# Patient Record
Sex: FEMALE | Race: WHITE | NOT HISPANIC OR LATINO | Employment: FULL TIME | URBAN - METROPOLITAN AREA
[De-identification: names, ages, dates, MRNs, and addresses within clinical notes are randomized per-mention and may not be internally consistent; named-entity substitution may affect disease eponyms.]

---

## 2017-06-10 ENCOUNTER — HOSPITAL ENCOUNTER (EMERGENCY)
Facility: MEDICAL CENTER | Age: 22
End: 2017-06-10
Attending: EMERGENCY MEDICINE
Payer: MEDICAID

## 2017-06-10 VITALS
RESPIRATION RATE: 16 BRPM | OXYGEN SATURATION: 100 % | SYSTOLIC BLOOD PRESSURE: 124 MMHG | BODY MASS INDEX: 20 KG/M2 | TEMPERATURE: 98.1 F | WEIGHT: 112.88 LBS | HEIGHT: 63 IN | HEART RATE: 62 BPM | DIASTOLIC BLOOD PRESSURE: 73 MMHG

## 2017-06-10 DIAGNOSIS — S51.812A FOREARM LACERATION, LEFT, INITIAL ENCOUNTER: ICD-10-CM

## 2017-06-10 DIAGNOSIS — F32.A DEPRESSION, UNSPECIFIED DEPRESSION TYPE: ICD-10-CM

## 2017-06-10 LAB
ALBUMIN SERPL BCP-MCNC: 4.6 G/DL (ref 3.2–4.9)
ALBUMIN/GLOB SERPL: 1.6 G/DL
ALP SERPL-CCNC: 53 U/L (ref 30–99)
ALT SERPL-CCNC: 13 U/L (ref 2–50)
AMPHET UR QL SCN: NEGATIVE
ANION GAP SERPL CALC-SCNC: 9 MMOL/L (ref 0–11.9)
AST SERPL-CCNC: 17 U/L (ref 12–45)
BARBITURATES UR QL SCN: NEGATIVE
BASOPHILS # BLD AUTO: 0.6 % (ref 0–1.8)
BASOPHILS # BLD: 0.04 K/UL (ref 0–0.12)
BENZODIAZ UR QL SCN: NEGATIVE
BILIRUB SERPL-MCNC: 0.7 MG/DL (ref 0.1–1.5)
BUN SERPL-MCNC: 14 MG/DL (ref 8–22)
BZE UR QL SCN: NEGATIVE
CALCIUM SERPL-MCNC: 10 MG/DL (ref 8.5–10.5)
CANNABINOIDS UR QL SCN: POSITIVE
CHLORIDE SERPL-SCNC: 105 MMOL/L (ref 96–112)
CO2 SERPL-SCNC: 27 MMOL/L (ref 20–33)
CREAT SERPL-MCNC: 0.6 MG/DL (ref 0.5–1.4)
EOSINOPHIL # BLD AUTO: 0.02 K/UL (ref 0–0.51)
EOSINOPHIL NFR BLD: 0.3 % (ref 0–6.9)
ERYTHROCYTE [DISTWIDTH] IN BLOOD BY AUTOMATED COUNT: 43.8 FL (ref 35.9–50)
ETHANOL BLD-MCNC: 0.15 G/DL
GFR SERPL CREATININE-BSD FRML MDRD: >60 ML/MIN/1.73 M 2
GLOBULIN SER CALC-MCNC: 2.8 G/DL (ref 1.9–3.5)
GLUCOSE SERPL-MCNC: 87 MG/DL (ref 65–99)
HCG SERPL QL: NEGATIVE
HCT VFR BLD AUTO: 43.8 % (ref 37–47)
HGB BLD-MCNC: 15 G/DL (ref 12–16)
IMM GRANULOCYTES # BLD AUTO: 0.01 K/UL (ref 0–0.11)
IMM GRANULOCYTES NFR BLD AUTO: 0.2 % (ref 0–0.9)
LYMPHOCYTES # BLD AUTO: 1.64 K/UL (ref 1–4.8)
LYMPHOCYTES NFR BLD: 25.8 % (ref 22–41)
MCH RBC QN AUTO: 32.3 PG (ref 27–33)
MCHC RBC AUTO-ENTMCNC: 34.2 G/DL (ref 33.6–35)
MCV RBC AUTO: 94.4 FL (ref 81.4–97.8)
MDMA UR QL SCN: NEGATIVE
METHADONE UR QL SCN: NEGATIVE
MONOCYTES # BLD AUTO: 0.44 K/UL (ref 0–0.85)
MONOCYTES NFR BLD AUTO: 6.9 % (ref 0–13.4)
NEUTROPHILS # BLD AUTO: 4.2 K/UL (ref 2–7.15)
NEUTROPHILS NFR BLD: 66.2 % (ref 44–72)
NRBC # BLD AUTO: 0 K/UL
NRBC BLD AUTO-RTO: 0 /100 WBC
OPIATES UR QL SCN: NEGATIVE
OXYCODONE UR QL SCN: NEGATIVE
PCP UR QL SCN: NEGATIVE
PLATELET # BLD AUTO: 208 K/UL (ref 164–446)
PMV BLD AUTO: 11.5 FL (ref 9–12.9)
POC BREATHALIZER: 0.08 PERCENT (ref 0–0.01)
POC BREATHALIZER: 0.08 PERCENT (ref 0–0.01)
POTASSIUM SERPL-SCNC: 3.8 MMOL/L (ref 3.6–5.5)
PROPOXYPH UR QL SCN: NEGATIVE
PROT SERPL-MCNC: 7.4 G/DL (ref 6–8.2)
RBC # BLD AUTO: 4.64 M/UL (ref 4.2–5.4)
SODIUM SERPL-SCNC: 141 MMOL/L (ref 135–145)
WBC # BLD AUTO: 6.4 K/UL (ref 4.8–10.8)

## 2017-06-10 PROCEDURE — 80053 COMPREHEN METABOLIC PANEL: CPT

## 2017-06-10 PROCEDURE — 700101 HCHG RX REV CODE 250: Performed by: EMERGENCY MEDICINE

## 2017-06-10 PROCEDURE — 302970 POC BREATHALIZER: Performed by: EMERGENCY MEDICINE

## 2017-06-10 PROCEDURE — 90791 PSYCH DIAGNOSTIC EVALUATION: CPT

## 2017-06-10 PROCEDURE — 304999 HCHG REPAIR-SIMPLE/INTERMED LEVEL 1

## 2017-06-10 PROCEDURE — 303747 HCHG EXTRA SUTURE

## 2017-06-10 PROCEDURE — 85025 COMPLETE CBC W/AUTO DIFF WBC: CPT

## 2017-06-10 PROCEDURE — 80307 DRUG TEST PRSMV CHEM ANLYZR: CPT

## 2017-06-10 PROCEDURE — 84703 CHORIONIC GONADOTROPIN ASSAY: CPT

## 2017-06-10 PROCEDURE — 303485 HCHG DRESSING MEDIUM

## 2017-06-10 PROCEDURE — 99284 EMERGENCY DEPT VISIT MOD MDM: CPT

## 2017-06-10 RX ORDER — LIDOCAINE HYDROCHLORIDE 10 MG/ML
20 INJECTION, SOLUTION INFILTRATION; PERINEURAL ONCE
Status: COMPLETED | OUTPATIENT
Start: 2017-06-10 | End: 2017-06-10

## 2017-06-10 RX ADMIN — LIDOCAINE HYDROCHLORIDE 20 ML: 10 INJECTION, SOLUTION INFILTRATION; PERINEURAL at 08:10

## 2017-06-10 ASSESSMENT — PAIN SCALES - GENERAL: PAINLEVEL_OUTOF10: 0

## 2017-06-10 NOTE — ED NOTES
".  Chief Complaint   Patient presents with   • Suicidal Ideation     Patient presents with SI with cut marks to left forearm, one open.  Bleeding controlled with dressing.  Small cut to R index finger.     Patient tearful during triage.  States that she has been having a hard time and \"some days are harder then others.\"  Hx of cutting.    Accompanied by family and friend.     Charge nurse notified. Patient to [facility].    "

## 2017-06-10 NOTE — ED AVS SNAPSHOT
6/10/2017    Maritza Swann  [address] [address]    Dear Maritza:    [facility] wants to ensure your discharge home is safe and you or your loved ones have had all of your questions answered regarding your care after you leave the hospital.    Below is a list of resources and contact information should you have any questions regarding your hospital stay, follow-up instructions, or active medical symptoms.    Questions or Concerns Regarding… Contact   Medical Questions Related to Your Discharge  (7 days a week, 8am-5pm) Contact a Nurse Care Coordinator   [phone]   Medical Questions Not Related to Your Discharge  (24 hours a day / 7 days a week)  Contact the Nurse Health Line   [phone]    Medications or Discharge Instructions Refer to your discharge packet   or contact your [facility] Primary Care Provider   [phone]   Follow-up Appointment(s) Schedule your appointment via [facility]   or contact Scheduling [phone]   Billing Review your statement via [facility]  or contact Billing [phone]   Medical Records Review your records via [facility]   or contact Medical Records [phone]     You may receive a telephone call within two days of discharge. This call is to make certain you understand your discharge instructions and have the opportunity to have any questions answered. You can also easily access your medical information, test results and upcoming appointments via the [facility] free online health management tool. You can learn more and sign up at [employer]. For assistance setting up your [facility] account, please call [phone].    Once again, we want to ensure your discharge home is safe and that you have a clear understanding of any next steps in your care. If you have any questions or concerns, please do not hesitate to contact us, we are here for you. Thank you for choosing [facility] for your healthcare needs.    Sincerely,    Your [facility] Healthcare Team

## 2017-06-10 NOTE — ED AVS SNAPSHOT
Home Care Instructions                                                                                                                Maritza Swann   MRN: [id number]    Department:  [facility], Emergency Dept   Date of Visit:  6/10/2017            [facility], Emergency Dept    [address] [address]    Phone:  [phone]      You were seen by     Guy G Gansert, M.D.      Your Diagnosis Was     Depression, unspecified depression type     F32.9       These are the medications you received during your hospitalization from 06/10/2017 0623 to 06/10/2017 1358     Date/Time Order Dose Route Action    06/10/2017 0810 lidocaine (XYLOCAINE) 1%  injection 20 mL Other Given      Follow-up Information     1. Follow up with [facility].    Why:  1.  Stitches out in 7 days; 2.  Follow up for assistance with depression;    Contact information    [address] [place] [id number] [phone]      Medication Information     Review all of your home medications and newly ordered medications with your primary doctor and/or pharmacist as soon as possible. Follow medication instructions as directed by your doctor and/or pharmacist.     Please keep your complete medication list with you and share with your physician. Update the information when medications are discontinued, doses are changed, or new medications (including over-the-counter products) are added; and carry medication information at all times in the event of emergency situations.               Medication List      Notice     You have not been prescribed any medications.            Procedures and tests performed during your visit     Procedure/Test Number of Times Performed    Blood Alcohol 1    CBC WITH DIFFERENTIAL 1    COMP METABOLIC PANEL 1    ESTIMATED GFR 1    HCG QUAL SERUM 1    POC BREATHALIZER 2    URINE DRUG SCREEN (TRIAGE) 1        Discharge Instructions       Depression,  Adult  Depression refers to feeling sad, low, down in the dumps, blue, gloomy, or empty. In general, there are two kinds of depression:  · Normal sadness or normal grief. This kind of depression is one that we all feel from time to time after upsetting life experiences, such as the loss of a job or the ending of a relationship. This kind of depression is considered normal, is short lived, and resolves within a few days to 2 weeks. Depression experienced after the loss of a loved one (bereavement) often lasts longer than 2 weeks but normally gets better with time.  · Clinical depression. This kind of depression lasts longer than normal sadness or normal grief or interferes with your ability to function at home, at work, and in school. It also interferes with your personal relationships. It affects almost every aspect of your life. Clinical depression is an illness.  Symptoms of depression can also be caused by conditions other than those mentioned above, such as:  · Physical illness. Some physical illnesses, including underactive thyroid gland (hypothyroidism), severe anemia, specific types of cancer, diabetes, uncontrolled seizures, heart and lung problems, strokes, and chronic pain are commonly associated with symptoms of depression.  · Side effects of some prescription medicine. In some people, certain types of medicine can cause symptoms of depression.  · Substance abuse. Abuse of alcohol and illicit drugs can cause symptoms of depression.  SYMPTOMS  Symptoms of normal sadness and normal grief include the following:  · Feeling sad or crying for short periods of time.  · Not caring about anything (apathy).  · Difficulty sleeping or sleeping too much.  · No longer able to enjoy the things you used to enjoy.  · Desire to be by oneself all the time (social isolation).  · Lack of energy or motivation.  · Difficulty concentrating or remembering.  · Change in appetite or weight.  · Restlessness or agitation.  Symptoms  of clinical depression include the same symptoms of normal sadness or normal grief and also the following symptoms:  · Feeling sad or crying all the time.  · Feelings of guilt or worthlessness.  · Feelings of hopelessness or helplessness.  · Thoughts of suicide or the desire to harm yourself (suicidal ideation).  · Loss of touch with reality (psychotic symptoms). Seeing or hearing things that are not real (hallucinations) or having false beliefs about your life or the people around you (delusions and paranoia).  DIAGNOSIS   The diagnosis of clinical depression is usually based on how bad the symptoms are and how long they have lasted. Your health care provider will also ask you questions about your medical history and substance use to find out if physical illness, use of prescription medicine, or substance abuse is causing your depression. Your health care provider may also order blood tests.  TREATMENT   Often, normal sadness and normal grief do not require treatment. However, sometimes antidepressant medicine is given for bereavement to ease the depressive symptoms until they resolve.  The treatment for clinical depression depends on how bad the symptoms are but often includes antidepressant medicine, counseling with a mental health professional, or both. Your health care provider will help to determine what treatment is best for you.  Depression caused by physical illness usually goes away with appropriate medical treatment of the illness. If prescription medicine is causing depression, talk with your health care provider about stopping the medicine, decreasing the dose, or changing to another medicine.  Depression caused by the abuse of alcohol or illicit drugs goes away when you stop using these substances. Some adults need professional help in order to stop drinking or using drugs.  SEEK IMMEDIATE MEDICAL CARE IF:  · You have thoughts about hurting yourself or others.  · You lose touch with reality (have  psychotic symptoms).  · You are taking medicine for depression and have a serious side effect.  FOR MORE INFORMATION  · National [place] on Mental Illness: www.karishma.org   · National [place] of Mental Health: www.nimh.nih.gov      This information is not intended to replace advice given to you by your health care provider. Make sure you discuss any questions you have with your health care provider.     Document Released: 12/15/2001 Document Revised: 01/08/2016 Document Reviewed: 03/18/2013  [employer] Interactive Patient Education ©2016 [employer]  Laceration Care, Adult  A laceration is a cut that goes through all of the layers of the skin and into the tissue that is right under the skin. Some lacerations heal on their own. Others need to be closed with stitches (sutures), staples, skin adhesive strips, or skin glue. Proper laceration care minimizes the risk of infection and helps the laceration to heal better.  HOW TO CARE FOR YOUR LACERATION  If sutures or staples were used:  · Keep the wound clean and dry.  · If you were given a bandage (dressing), you should change it at least one time per day or as told by your health care provider. You should also change it if it becomes wet or dirty.  · Keep the wound completely dry for the first 24 hours or as told by your health care provider. After that time, you may shower or bathe. However, make sure that the wound is not soaked in water until after the sutures or staples have been removed.  · Clean the wound one time each day or as told by your health care provider:  ¨ Wash the wound with soap and water.  ¨ Rinse the wound with water to remove all soap.  ¨ Pat the wound dry with a clean towel. Do not rub the wound.  · After cleaning the wound, apply a thin layer of antibiotic ointment as told by your health care provider. This will help to prevent infection and keep the dressing from sticking to the wound.  · Have the sutures or staples removed as told by your  health care provider.  If skin adhesive strips were used:  · Keep the wound clean and dry.  · If you were given a bandage (dressing), you should change it at least one time per day or as told by your health care provider. You should also change it if it becomes dirty or wet.  · Do not get the skin adhesive strips wet. You may shower or bathe, but be careful to keep the wound dry.  · If the wound gets wet, pat it dry with a clean towel. Do not rub the wound.  · Skin adhesive strips fall off on their own. You may trim the strips as the wound heals. Do not remove skin adhesive strips that are still stuck to the wound. They will fall off in time.  If skin glue was used:  · Try to keep the wound dry, but you may briefly wet it in the shower or bath. Do not soak the wound in water, such as by swimming.  · After you have showered or bathed, gently pat the wound dry with a clean towel. Do not rub the wound.  · Do not do any activities that will make you sweat heavily until the skin glue has fallen off on its own.  · Do not apply liquid, cream, or ointment medicine to the wound while the skin glue is in place. Using those may loosen the film before the wound has healed.  · If you were given a bandage (dressing), you should change it at least one time per day or as told by your health care provider. You should also change it if it becomes dirty or wet.  · If a dressing is placed over the wound, be careful not to apply tape directly over the skin glue. Doing that may cause the glue to be pulled off before the wound has healed.  · Do not pick at the glue. The skin glue usually remains in place for 5-10 days, then it falls off of the skin.  General Instructions  · Take over-the-counter and prescription medicines only as told by your health care provider.  · If you were prescribed an antibiotic medicine or ointment, take or apply it as told by your doctor. Do not stop using it even if your condition improves.  · To help prevent  scarring, make sure to cover your wound with sunscreen whenever you are outside after stitches are removed, after adhesive strips are removed, or when glue remains in place and the wound is healed. Make sure to wear a sunscreen of at least 30 SPF.  · Do not scratch or pick at the wound.  · Keep all follow-up visits as told by your health care provider. This is important.  · Check your wound every day for signs of infection. Watch for:  ¨ Redness, swelling, or pain.  ¨ Fluid, blood, or pus.  · Raise (elevate) the injured area above the level of your heart while you are sitting or lying down, if possible.  SEEK MEDICAL CARE IF:  · You received a tetanus shot and you have swelling, severe pain, redness, or bleeding at the injection site.  · You have a fever.  · A wound that was closed breaks open.  · You notice a bad smell coming from your wound or your dressing.  · You notice something coming out of the wound, such as wood or glass.  · Your pain is not controlled with medicine.  · You have increased redness, swelling, or pain at the site of your wound.  · You have fluid, blood, or pus coming from your wound.  · You notice a change in the color of your skin near your wound.  · You need to change the dressing frequently due to fluid, blood, or pus draining from the wound.  · You develop a new rash.  · You develop numbness around the wound.  SEEK IMMEDIATE MEDICAL CARE IF:  · You develop severe swelling around the wound.  · Your pain suddenly increases and is severe.  · You develop painful lumps near the wound or on skin that is anywhere on your body.  · You have a red streak going away from your wound.  · The wound is on your hand or foot and you cannot properly move a finger or toe.  · The wound is on your hand or foot and you notice that your fingers or toes look pale or bluish.     This information is not intended to replace advice given to you by your health care provider. Make sure you discuss any questions you  have with your health care provider.     Document Released: 12/18/2006 Document Revised: 05/03/2016 Document Reviewed: 12/14/2015  [employer] Interactive Patient Education ©2016 [employer]            Patient Information     Patient Information    Following emergency treatment: all patient requiring follow-up care must return either to a private physician or a clinic if your condition worsens before you are able to obtain further medical attention, please return to the emergency room.     Billing Information    At [facility], we work to make the billing process streamlined for our patients.  Our Representatives are here to answer any questions you may have regarding your hospital bill.  If you have insurance coverage and have supplied your insurance information to us, we will submit a claim to your insurer on your behalf.  Should you have any questions regarding your bill, we can be reached online or by phone as follows:  Online: You are able pay your bills online or live chat with our representatives about any billing questions you may have. We are here to help Monday - Friday from 8:00am to 7:30pm and 9:00am - 12:00pm on Saturdays.  Please visit [email or web address]  for more information.   Phone:  [phone] or [phone]    Please note that your emergency physician, surgeon, pathologist, radiologist, anesthesiologist, and other specialists are not employed by [facility] and will therefore bill separately for their services.  Please contact them directly for any questions concerning their bills at the numbers below:     Emergency Physician Services:  [phone]  [facility]:  [phone]  [facility]:  [phone]  [facility]:  [phone]    1. Your final bill may vary from the amount quoted upon discharge if all procedures are not complete at that time, or if your doctor has additional procedures of which we are not  aware. You will receive an additional bill if you return to the Emergency Department at [facility] for suture removal regardless of the facility of which the sutures were placed.     2. Please arrange for settlement of this account at the emergency registration.    3. All self-pay accounts are due in full at the time of treatment.  If you are unable to meet this obligation then payment is expected within 4-5 days.     4. If you have had radiology studies (CT, X-ray, Ultrasound, MRI), you have received a preliminary result during your emergency department visit. Please contact the radiology department [phone] to receive a copy of your final result. Please discuss the Final result with your primary physician or with the follow up physician provided.     Crisis Hotline:  [place] Crisis Hotline:  8-111-HBEKQNZ or 1-185.588.6037  [place] Crisis Hotline:    1-844.306.2207 or 085-424-3666         ED Discharge Follow Up Questions    1. In order to provide you with very good care, we would like to follow up with a phone call in the next few days.  May we have your permission to contact you?     YES /  NO    2. What is the best phone number to call you? (       )_____-__________    3. What is the best time to call you?      Morning  /  Afternoon  /  Evening                   Patient Signature:  ____________________________________________________________    Date:  ____________________________________________________________
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
## 2017-06-10 NOTE — ED NOTES
Pt's belongings in single blue personal belonging bag. Labeled approrpriately and placed in ambulance bay.

## 2017-06-10 NOTE — ED AVS SNAPSHOT
[employer] Access Code: [email or web address]  Expires: 7/10/2017  1:58 PM    [employer]  A secure, online tool to manage your health information     [employer]® is a secure, online tool that connects you to your personalized health information from the privacy of your home -- day or night - making it very easy for you to manage your healthcare. Once the activation process is completed, you can even access your medical information using the [employer] katarzyna, which is available for free in the Apple Katarzyna store or Google Play store.     [employer] provides the following levels of access (as shown below):   My Chart Features   [facility] Primary Care Doctor [facility]  Specialists [facility]  Urgent  Care Non-[facility]  Primary Care  Doctor   Email your healthcare team securely and privately 24/7 X X X X   Manage appointments: schedule your next appointment; view details of past/upcoming appointments X      Request prescription refills. X      View recent personal medical records, including lab and immunizations X X X X   View health record, including health history, allergies, medications X X X X   Read reports about your outpatient visits, procedures, consult and ER notes X X X X   See your discharge summary, which is a recap of your hospital and/or ER visit that includes your diagnosis, lab results, and care plan. X X       How to register for [employer]:  1. Go to  [email or web address].  2. Click on the Sign Up Now box, which takes you to the New Member Sign Up page. You will need to provide the following information:  a. Enter your [employer] Access Code exactly as it appears at the top of this page. (You will not need to use this code after you’ve completed the sign-up process. If you do not sign up before the expiration date, you must request a new code.)   b. Enter your date of birth.   c. Enter your home email address.   d. Click Submit, and follow the next screen’s instructions.  3. Create a [employer] ID. This will be your [employer]  login ID and cannot be changed, so think of one that is secure and easy to remember.  4. Create a [employer] password. You can change your password at any time.  5. Enter your Password Reset Question and Answer. This can be used at a later time if you forget your password.   6. Enter your e-mail address. This allows you to receive e-mail notifications when new information is available in [employer].  7. Click Sign Up. You can now view your health information.    For assistance activating your [employer] account, call [phone]

## 2017-06-10 NOTE — ED NOTES
Pt provided with discharge instructions, instructions for follow up appointment and to return in 7 days for suture removal, s/s of when to seek emergency care.  Pt verbalizes understanding.  Pt discharged in good condition.

## 2017-06-10 NOTE — DISCHARGE INSTRUCTIONS
Depression, Adult  Depression refers to feeling sad, low, down in the dumps, blue, gloomy, or empty. In general, there are two kinds of depression:  · Normal sadness or normal grief. This kind of depression is one that we all feel from time to time after upsetting life experiences, such as the loss of a job or the ending of a relationship. This kind of depression is considered normal, is short lived, and resolves within a few days to 2 weeks. Depression experienced after the loss of a loved one (bereavement) often lasts longer than 2 weeks but normally gets better with time.  · Clinical depression. This kind of depression lasts longer than normal sadness or normal grief or interferes with your ability to function at home, at work, and in school. It also interferes with your personal relationships. It affects almost every aspect of your life. Clinical depression is an illness.  Symptoms of depression can also be caused by conditions other than those mentioned above, such as:  · Physical illness. Some physical illnesses, including underactive thyroid gland (hypothyroidism), severe anemia, specific types of cancer, diabetes, uncontrolled seizures, heart and lung problems, strokes, and chronic pain are commonly associated with symptoms of depression.  · Side effects of some prescription medicine. In some people, certain types of medicine can cause symptoms of depression.  · Substance abuse. Abuse of alcohol and illicit drugs can cause symptoms of depression.  SYMPTOMS  Symptoms of normal sadness and normal grief include the following:  · Feeling sad or crying for short periods of time.  · Not caring about anything (apathy).  · Difficulty sleeping or sleeping too much.  · No longer able to enjoy the things you used to enjoy.  · Desire to be by oneself all the time (social isolation).  · Lack of energy or motivation.  · Difficulty concentrating or remembering.  · Change in appetite or weight.  · Restlessness or  agitation.  Symptoms of clinical depression include the same symptoms of normal sadness or normal grief and also the following symptoms:  · Feeling sad or crying all the time.  · Feelings of guilt or worthlessness.  · Feelings of hopelessness or helplessness.  · Thoughts of suicide or the desire to harm yourself (suicidal ideation).  · Loss of touch with reality (psychotic symptoms). Seeing or hearing things that are not real (hallucinations) or having false beliefs about your life or the people around you (delusions and paranoia).  DIAGNOSIS   The diagnosis of clinical depression is usually based on how bad the symptoms are and how long they have lasted. Your health care provider will also ask you questions about your medical history and substance use to find out if physical illness, use of prescription medicine, or substance abuse is causing your depression. Your health care provider may also order blood tests.  TREATMENT   Often, normal sadness and normal grief do not require treatment. However, sometimes antidepressant medicine is given for bereavement to ease the depressive symptoms until they resolve.  The treatment for clinical depression depends on how bad the symptoms are but often includes antidepressant medicine, counseling with a mental health professional, or both. Your health care provider will help to determine what treatment is best for you.  Depression caused by physical illness usually goes away with appropriate medical treatment of the illness. If prescription medicine is causing depression, talk with your health care provider about stopping the medicine, decreasing the dose, or changing to another medicine.  Depression caused by the abuse of alcohol or illicit drugs goes away when you stop using these substances. Some adults need professional help in order to stop drinking or using drugs.  SEEK IMMEDIATE MEDICAL CARE IF:  · You have thoughts about hurting yourself or others.  · You lose touch  with reality (have psychotic symptoms).  · You are taking medicine for depression and have a serious side effect.  FOR MORE INFORMATION  · National [place] on Mental Illness: www.karishma.org   · National [place] of Mental Health: www.nimh.nih.gov      This information is not intended to replace advice given to you by your health care provider. Make sure you discuss any questions you have with your health care provider.     Document Released: 12/15/2001 Document Revised: 01/08/2016 Document Reviewed: 03/18/2013  [employer] Interactive Patient Education ©2016 [employer]  Laceration Care, Adult  A laceration is a cut that goes through all of the layers of the skin and into the tissue that is right under the skin. Some lacerations heal on their own. Others need to be closed with stitches (sutures), staples, skin adhesive strips, or skin glue. Proper laceration care minimizes the risk of infection and helps the laceration to heal better.  HOW TO CARE FOR YOUR LACERATION  If sutures or staples were used:  · Keep the wound clean and dry.  · If you were given a bandage (dressing), you should change it at least one time per day or as told by your health care provider. You should also change it if it becomes wet or dirty.  · Keep the wound completely dry for the first 24 hours or as told by your health care provider. After that time, you may shower or bathe. However, make sure that the wound is not soaked in water until after the sutures or staples have been removed.  · Clean the wound one time each day or as told by your health care provider:  ¨ Wash the wound with soap and water.  ¨ Rinse the wound with water to remove all soap.  ¨ Pat the wound dry with a clean towel. Do not rub the wound.  · After cleaning the wound, apply a thin layer of antibiotic ointment as told by your health care provider. This will help to prevent infection and keep the dressing from sticking to the wound.  · Have the sutures or staples removed as  told by your health care provider.  If skin adhesive strips were used:  · Keep the wound clean and dry.  · If you were given a bandage (dressing), you should change it at least one time per day or as told by your health care provider. You should also change it if it becomes dirty or wet.  · Do not get the skin adhesive strips wet. You may shower or bathe, but be careful to keep the wound dry.  · If the wound gets wet, pat it dry with a clean towel. Do not rub the wound.  · Skin adhesive strips fall off on their own. You may trim the strips as the wound heals. Do not remove skin adhesive strips that are still stuck to the wound. They will fall off in time.  If skin glue was used:  · Try to keep the wound dry, but you may briefly wet it in the shower or bath. Do not soak the wound in water, such as by swimming.  · After you have showered or bathed, gently pat the wound dry with a clean towel. Do not rub the wound.  · Do not do any activities that will make you sweat heavily until the skin glue has fallen off on its own.  · Do not apply liquid, cream, or ointment medicine to the wound while the skin glue is in place. Using those may loosen the film before the wound has healed.  · If you were given a bandage (dressing), you should change it at least one time per day or as told by your health care provider. You should also change it if it becomes dirty or wet.  · If a dressing is placed over the wound, be careful not to apply tape directly over the skin glue. Doing that may cause the glue to be pulled off before the wound has healed.  · Do not pick at the glue. The skin glue usually remains in place for 5-10 days, then it falls off of the skin.  General Instructions  · Take over-the-counter and prescription medicines only as told by your health care provider.  · If you were prescribed an antibiotic medicine or ointment, take or apply it as told by your doctor. Do not stop using it even if your condition improves.  · To  help prevent scarring, make sure to cover your wound with sunscreen whenever you are outside after stitches are removed, after adhesive strips are removed, or when glue remains in place and the wound is healed. Make sure to wear a sunscreen of at least 30 SPF.  · Do not scratch or pick at the wound.  · Keep all follow-up visits as told by your health care provider. This is important.  · Check your wound every day for signs of infection. Watch for:  ¨ Redness, swelling, or pain.  ¨ Fluid, blood, or pus.  · Raise (elevate) the injured area above the level of your heart while you are sitting or lying down, if possible.  SEEK MEDICAL CARE IF:  · You received a tetanus shot and you have swelling, severe pain, redness, or bleeding at the injection site.  · You have a fever.  · A wound that was closed breaks open.  · You notice a bad smell coming from your wound or your dressing.  · You notice something coming out of the wound, such as wood or glass.  · Your pain is not controlled with medicine.  · You have increased redness, swelling, or pain at the site of your wound.  · You have fluid, blood, or pus coming from your wound.  · You notice a change in the color of your skin near your wound.  · You need to change the dressing frequently due to fluid, blood, or pus draining from the wound.  · You develop a new rash.  · You develop numbness around the wound.  SEEK IMMEDIATE MEDICAL CARE IF:  · You develop severe swelling around the wound.  · Your pain suddenly increases and is severe.  · You develop painful lumps near the wound or on skin that is anywhere on your body.  · You have a red streak going away from your wound.  · The wound is on your hand or foot and you cannot properly move a finger or toe.  · The wound is on your hand or foot and you notice that your fingers or toes look pale or bluish.     This information is not intended to replace advice given to you by your health care provider. Make sure you discuss any  questions you have with your health care provider.     Document Released: 12/18/2006 Document Revised: 05/03/2016 Document Reviewed: 12/14/2015  [email or web address] Interactive Patient Education ©2016 [employer]

## 2017-06-10 NOTE — ED PROVIDER NOTES
ED Provider Note    CHIEF COMPLAINT  Chief Complaint   Patient presents with   • Suicidal Ideation       HPI  Maritza Swann is a 21 y.o. female who presents for evaluation of suicidal ideation.  The patient presents with friends after he she inflicted lacerations to her left forearm.  The patient denies any specific event that precipitated this.  She states she's had depression in the past and has had previous suicide attempts by cutting herself.  She is currently not on any antidepressants or receive treatment for her depression.  She denies recent: Fever, URI symptoms, cardiorespiratory symptoms, gastrointestinal symptoms.  No other acute symptomatology or complaints.    REVIEW OF SYSTEMS  See HPI for further details.  No history of: Diabetes, thyroid dysfunction, seizures, cardiac disorders.  All other systems negative.    PAST MEDICAL HISTORY  Past Medical History   Diagnosis Date   • Other specified symptom associated with female genital organs      endometriosis, ovarian cysts   • Asthma      as a child   • Migraine    • Endometriosis 2014   • Ovarian cyst 2014   • Depression        FAMILY HISTORY  Family History   Problem Relation Age of Onset   • Diabetes     • Hypertension     • Hypertension Father    • Bipolar disorder Sister    • Other Sister      mental disorder.   • Asthma Brother    • Arthritis Maternal Grandmother    • Diabetes Maternal Grandmother    • Bipolar disorder Maternal Grandfather    • Other Maternal Grandfather      mental disorder.       SOCIAL HISTORY  Denies tobacco use; positive alcohol use; positive marijuana use;    SURGICAL HISTORY  Past Surgical History   Procedure Laterality Date   • Dental extraction(s)  2008     wisdom teeth   • Laparoscopy  7/20/2014   • Mammoplasty augmentation Bilateral 5/27/2015     Procedure: MAMMOPLASTY AUGMENTATION-SALINE;  Surgeon: Prince Lyn M.D.;  Location: SURGERY [facility];  Service:        CURRENT MEDICATIONS  Home Medications   "   Reviewed by Nevin Montana R.N. (Registered Nurse) on 06/10/17 at 0701  Med List Status: Complete    Medication Last Dose Status          Patient Morris Taking any Medications                        ALLERGIES  No Known Allergies    PHYSICAL EXAM  VITAL SIGNS: /71 mmHg  Pulse 63  Temp(Src) 36.7 °C (98.1 °F)  Resp 14  Ht 1.6 m (5' 3\")  Wt 51.2 kg (112 lb 14 oz)  BMI 20.00 kg/m2  SpO2 98%  Breastfeeding? Yes   Constitutional: 21-year-old female, Well developed, Well nourished, tearful, smells of alcohol metabolites, oriented ×3   HENT: Normocephalic, Atraumatic, Nares:Clear, Oropharynx: moist, well hydrated, posterior pharynx:clear   Eyes: PERRL, EOMI, Conjunctiva normal, No discharge.   Neck: Normal range of motion, No tenderness, Supple, No stridor.   Lymphatic: No lymphadenopathy noted.   Cardiovascular: Regular rate and rhythm without mumurs, gallups, rubs   Thorax & Lungs: Normal Equal breath sounds, No respiratory distress, No wheezing, no stridor, no rales. No chest tenderness.   Abdomen: Soft, nontender, nondistended, no organomegaly, positive bowel sounds normal in quality. No guarding or rebound.  Skin: Good skin turgor, pink, warm, dry. No rashes, petechiae, purpura. Normal capillary refill.   Back: No tenderness, No CVA tenderness.   Extremities: Intact distal pulses, No edema, No tenderness, No cyanosis,  Vascular: Pulses are 2+, symmetric in the upper and lower extremities.  Musculoskeletal: Left forearm reveals multiple lacerations: A 4 cm laceration over the distal volar forearm area; a 2 cm laceration proximal to the larger laceration; both are full thickness; no foreign bodies identified and no evidence of neurovascular injury;  Neurologic: Alert & oriented x 3,  No gross focal deficits noted.   Psychiatric: Affect normal, Judgment normal, Mood: Depressed but no active suicidal ideation      RADIOLOGY/PROCEDURES  1.  Suture repair    COURSE & MEDICAL DECISION MAKING  Pertinent " Labs & Imaging studies reviewed. (See chart for details)  Laboratory studies: CBC and differential within normal; CMP within normal limits; diagnostic alcohol 0.15; beta hCG is negative; urine drug screen is positive for cannabinoids;    Procedure note: The patient's forearm lacerations were prepped and draped in sterile fashion.  The skin was anesthetized with 1% lidocaine.  Lacerations were closed with 4-0 Ethilon.  Running suture was performed on both lacerations.  11 throws were performed on the laceration and 6 throws on the smaller laceration.  No complications.  Wound cleansed and dressed.    Discussion/consultation: At this time, the patient presents for evaluation of depression with suicidal ideation and self-inflicted lacerations to her forearm.  Lacerations were treated as noted above.  The patient was evaluated by the Alert Team counselor.  The patient is not exhibiting active suicidal ideation and does not meet criteria for involuntary committal under legal 2000.  The alert team counselor agrees with this assessment.  The patient has been given appropriate outpatient follow-up services.  In addition, instructions regarding her laceration care were given.    FINAL IMPRESSION  1. Depression, unspecified depression type    2. Forearm laceration, left, initial encounter         PLAN  1.  Appropriate discharge instructions given  2.  Stitches out in one week  3.  Follow-up with primary care  4.  Follow-up with psychology/psychiatry    Electronically signed by: Guy G Gansert, 6/10/2017 7:03 AM

## 2017-06-10 NOTE — ED NOTES
Legal hold discontinued by ERP.  Pt denies SI and has a good support system.  1 bag of belongings returned to pt.

## 2017-06-10 NOTE — CONSULTS
[facility]  Crisis/Safety Plan    Name:  Maritza Swann  MRN:  [id number]  Date:  6/10/2017    Warning signs that a crisis may be developing for me or I may be at risk:  1) increasing feelings of despair and sadness  2) increase in anxiety and panic attacks  3)lack of interest and pleasure in life    Coping strategies I can use on my own (relaxation, physical activity, etc):  1) try to exercise every day and get at least 7 hrs of sleep a night  2) play with your child in the park  3) listen to soft music    Ways I can make my environment safe:  1)keep all weapons out of your home  2)keep sharps secure  3)keep medications secure    Things I want to tell myself when I feel a crisis developin) try to stay calm  2) remember there is help out there  3)use resources in the community for help    People I can contact for support or distraction (and their phone numbers):  1) Bela 8866636  2) Agatha 0248163  3)use numbers below    If I’m not able to reach my support people, or the above strategies don’t help, I can contact the following professionals, agencies, or hotlines:  1) Crisis Call Center ():  [phone] -OR- [phone]  2) Crisis Text Line ():  Text START to 276390  3)   4)     Kishor Estes R.N.

## 2018-05-01 ENCOUNTER — APPOINTMENT (OUTPATIENT)
Dept: RADIOLOGY | Facility: MEDICAL CENTER | Age: 23
DRG: 163 | End: 2018-05-01
Attending: STUDENT IN AN ORGANIZED HEALTH CARE EDUCATION/TRAINING PROGRAM
Payer: MEDICAID

## 2018-05-01 ENCOUNTER — HOSPITAL ENCOUNTER (INPATIENT)
Facility: MEDICAL CENTER | Age: 23
LOS: 7 days | DRG: 163 | End: 2018-05-08
Attending: EMERGENCY MEDICINE | Admitting: INTERNAL MEDICINE
Payer: MEDICAID

## 2018-05-01 ENCOUNTER — APPOINTMENT (OUTPATIENT)
Dept: RADIOLOGY | Facility: MEDICAL CENTER | Age: 23
DRG: 163 | End: 2018-05-01
Attending: EMERGENCY MEDICINE
Payer: MEDICAID

## 2018-05-01 DIAGNOSIS — J18.9 PNEUMONIA OF LEFT LOWER LOBE DUE TO INFECTIOUS ORGANISM: ICD-10-CM

## 2018-05-01 DIAGNOSIS — J86.9 EMPYEMA OF LEFT PLEURAL SPACE (HCC): ICD-10-CM

## 2018-05-01 DIAGNOSIS — J90 PLEURAL EFFUSION: ICD-10-CM

## 2018-05-01 PROBLEM — R06.02 SHORTNESS OF BREATH: Status: ACTIVE | Noted: 2018-05-01

## 2018-05-01 PROBLEM — D75.839 THROMBOCYTOSIS: Status: ACTIVE | Noted: 2018-05-01

## 2018-05-01 LAB
ALBUMIN SERPL BCP-MCNC: 4 G/DL (ref 3.2–4.9)
ALBUMIN/GLOB SERPL: 0.9 G/DL
ALP SERPL-CCNC: 71 U/L (ref 30–99)
ALT SERPL-CCNC: 33 U/L (ref 2–50)
ANION GAP SERPL CALC-SCNC: 10 MMOL/L (ref 0–11.9)
APPEARANCE UR: CLEAR
AST SERPL-CCNC: 18 U/L (ref 12–45)
BASOPHILS # BLD AUTO: 0.6 % (ref 0–1.8)
BASOPHILS # BLD: 0.06 K/UL (ref 0–0.12)
BILIRUB SERPL-MCNC: 0.7 MG/DL (ref 0.1–1.5)
BILIRUB UR QL STRIP.AUTO: NEGATIVE
BNP SERPL-MCNC: 16 PG/ML (ref 0–100)
BUN SERPL-MCNC: 18 MG/DL (ref 8–22)
CALCIUM SERPL-MCNC: 9.8 MG/DL (ref 8.5–10.5)
CHLORIDE SERPL-SCNC: 100 MMOL/L (ref 96–112)
CO2 SERPL-SCNC: 27 MMOL/L (ref 20–33)
COLOR UR: YELLOW
CREAT SERPL-MCNC: 0.61 MG/DL (ref 0.5–1.4)
DEPRECATED D DIMER PPP IA-ACNC: 2687 NG/ML(D-DU)
EKG IMPRESSION: NORMAL
EOSINOPHIL # BLD AUTO: 0.01 K/UL (ref 0–0.51)
EOSINOPHIL NFR BLD: 0.1 % (ref 0–6.9)
ERYTHROCYTE [DISTWIDTH] IN BLOOD BY AUTOMATED COUNT: 44.7 FL (ref 35.9–50)
GLOBULIN SER CALC-MCNC: 4.4 G/DL (ref 1.9–3.5)
GLUCOSE SERPL-MCNC: 84 MG/DL (ref 65–99)
GLUCOSE UR STRIP.AUTO-MCNC: NEGATIVE MG/DL
HCG SERPL QL: NEGATIVE
HCT VFR BLD AUTO: 36 % (ref 37–47)
HGB BLD-MCNC: 12 G/DL (ref 12–16)
IMM GRANULOCYTES # BLD AUTO: 0.04 K/UL (ref 0–0.11)
IMM GRANULOCYTES NFR BLD AUTO: 0.4 % (ref 0–0.9)
KETONES UR STRIP.AUTO-MCNC: NEGATIVE MG/DL
LACTATE BLD-SCNC: 1.7 MMOL/L (ref 0.5–2)
LEUKOCYTE ESTERASE UR QL STRIP.AUTO: NEGATIVE
LYMPHOCYTES # BLD AUTO: 1.78 K/UL (ref 1–4.8)
LYMPHOCYTES NFR BLD: 16.7 % (ref 22–41)
MCH RBC QN AUTO: 31.8 PG (ref 27–33)
MCHC RBC AUTO-ENTMCNC: 33.3 G/DL (ref 33.6–35)
MCV RBC AUTO: 95.5 FL (ref 81.4–97.8)
MICRO URNS: ABNORMAL
MONOCYTES # BLD AUTO: 0.73 K/UL (ref 0–0.85)
MONOCYTES NFR BLD AUTO: 6.9 % (ref 0–13.4)
NEUTROPHILS # BLD AUTO: 8.03 K/UL (ref 2–7.15)
NEUTROPHILS NFR BLD: 75.3 % (ref 44–72)
NITRITE UR QL STRIP.AUTO: NEGATIVE
NRBC # BLD AUTO: 0 K/UL
NRBC BLD-RTO: 0 /100 WBC
PH UR STRIP.AUTO: >=9 [PH]
PLATELET # BLD AUTO: 726 K/UL (ref 164–446)
PMV BLD AUTO: 8.9 FL (ref 9–12.9)
POTASSIUM SERPL-SCNC: 4 MMOL/L (ref 3.6–5.5)
PROCALCITONIN SERPL-MCNC: 0.06 NG/ML
PROT SERPL-MCNC: 8.4 G/DL (ref 6–8.2)
PROT UR QL STRIP: NEGATIVE MG/DL
RBC # BLD AUTO: 3.77 M/UL (ref 4.2–5.4)
RBC UR QL AUTO: NEGATIVE
SODIUM SERPL-SCNC: 137 MMOL/L (ref 135–145)
SP GR UR STRIP.AUTO: 1.01
TROPONIN I SERPL-MCNC: <0.01 NG/ML (ref 0–0.04)
TSH SERPL DL<=0.005 MIU/L-ACNC: 0.75 UIU/ML (ref 0.38–5.33)
UROBILINOGEN UR STRIP.AUTO-MCNC: 0.2 MG/DL
WBC # BLD AUTO: 10.7 K/UL (ref 4.8–10.8)

## 2018-05-01 PROCEDURE — 700105 HCHG RX REV CODE 258: Performed by: EMERGENCY MEDICINE

## 2018-05-01 PROCEDURE — 94760 N-INVAS EAR/PLS OXIMETRY 1: CPT

## 2018-05-01 PROCEDURE — 84443 ASSAY THYROID STIM HORMONE: CPT

## 2018-05-01 PROCEDURE — 71045 X-RAY EXAM CHEST 1 VIEW: CPT

## 2018-05-01 PROCEDURE — 96374 THER/PROPH/DIAG INJ IV PUSH: CPT

## 2018-05-01 PROCEDURE — 99223 1ST HOSP IP/OBS HIGH 75: CPT | Performed by: INTERNAL MEDICINE

## 2018-05-01 PROCEDURE — 83605 ASSAY OF LACTIC ACID: CPT

## 2018-05-01 PROCEDURE — 700111 HCHG RX REV CODE 636 W/ 250 OVERRIDE (IP): Performed by: INTERNAL MEDICINE

## 2018-05-01 PROCEDURE — 84703 CHORIONIC GONADOTROPIN ASSAY: CPT

## 2018-05-01 PROCEDURE — 93005 ELECTROCARDIOGRAM TRACING: CPT | Performed by: STUDENT IN AN ORGANIZED HEALTH CARE EDUCATION/TRAINING PROGRAM

## 2018-05-01 PROCEDURE — A9270 NON-COVERED ITEM OR SERVICE: HCPCS | Performed by: INTERNAL MEDICINE

## 2018-05-01 PROCEDURE — 770020 HCHG ROOM/CARE - TELE (206)

## 2018-05-01 PROCEDURE — 700102 HCHG RX REV CODE 250 W/ 637 OVERRIDE(OP): Performed by: STUDENT IN AN ORGANIZED HEALTH CARE EDUCATION/TRAINING PROGRAM

## 2018-05-01 PROCEDURE — 93010 ELECTROCARDIOGRAM REPORT: CPT | Performed by: INTERNAL MEDICINE

## 2018-05-01 PROCEDURE — 700101 HCHG RX REV CODE 250: Performed by: INTERNAL MEDICINE

## 2018-05-01 PROCEDURE — 99285 EMERGENCY DEPT VISIT HI MDM: CPT

## 2018-05-01 PROCEDURE — 80053 COMPREHEN METABOLIC PANEL: CPT

## 2018-05-01 PROCEDURE — 85025 COMPLETE CBC W/AUTO DIFF WBC: CPT

## 2018-05-01 PROCEDURE — 81003 URINALYSIS AUTO W/O SCOPE: CPT

## 2018-05-01 PROCEDURE — 700105 HCHG RX REV CODE 258: Performed by: INTERNAL MEDICINE

## 2018-05-01 PROCEDURE — 84484 ASSAY OF TROPONIN QUANT: CPT

## 2018-05-01 PROCEDURE — 700111 HCHG RX REV CODE 636 W/ 250 OVERRIDE (IP): Performed by: EMERGENCY MEDICINE

## 2018-05-01 PROCEDURE — 700117 HCHG RX CONTRAST REV CODE 255: Performed by: STUDENT IN AN ORGANIZED HEALTH CARE EDUCATION/TRAINING PROGRAM

## 2018-05-01 PROCEDURE — 84145 PROCALCITONIN (PCT): CPT

## 2018-05-01 PROCEDURE — 85379 FIBRIN DEGRADATION QUANT: CPT

## 2018-05-01 PROCEDURE — 87086 URINE CULTURE/COLONY COUNT: CPT

## 2018-05-01 PROCEDURE — 700102 HCHG RX REV CODE 250 W/ 637 OVERRIDE(OP): Performed by: INTERNAL MEDICINE

## 2018-05-01 PROCEDURE — 83880 ASSAY OF NATRIURETIC PEPTIDE: CPT

## 2018-05-01 PROCEDURE — 87040 BLOOD CULTURE FOR BACTERIA: CPT

## 2018-05-01 PROCEDURE — 71275 CT ANGIOGRAPHY CHEST: CPT

## 2018-05-01 PROCEDURE — A9270 NON-COVERED ITEM OR SERVICE: HCPCS | Performed by: STUDENT IN AN ORGANIZED HEALTH CARE EDUCATION/TRAINING PROGRAM

## 2018-05-01 PROCEDURE — 36415 COLL VENOUS BLD VENIPUNCTURE: CPT

## 2018-05-01 RX ORDER — AMOXICILLIN 250 MG
2 CAPSULE ORAL 2 TIMES DAILY
Status: DISCONTINUED | OUTPATIENT
Start: 2018-05-01 | End: 2018-05-04

## 2018-05-01 RX ORDER — OXYCODONE HYDROCHLORIDE AND ACETAMINOPHEN 5; 325 MG/1; MG/1
1-2 TABLET ORAL EVERY 4 HOURS PRN
COMMUNITY
End: 2018-05-01

## 2018-05-01 RX ORDER — ONDANSETRON 4 MG/1
4 TABLET, ORALLY DISINTEGRATING ORAL EVERY 6 HOURS PRN
COMMUNITY
End: 2018-05-01

## 2018-05-01 RX ORDER — GUAIFENESIN 200 MG/1
200 TABLET ORAL 3 TIMES DAILY
Status: ON HOLD | COMMUNITY
End: 2018-05-08

## 2018-05-01 RX ORDER — POLYETHYLENE GLYCOL 3350 17 G/17G
1 POWDER, FOR SOLUTION ORAL
Status: DISCONTINUED | OUTPATIENT
Start: 2018-05-01 | End: 2018-05-04

## 2018-05-01 RX ORDER — IBUPROFEN 600 MG/1
600 TABLET ORAL EVERY 6 HOURS PRN
Status: DISCONTINUED | OUTPATIENT
Start: 2018-05-01 | End: 2018-05-03

## 2018-05-01 RX ORDER — ACETAMINOPHEN 325 MG/1
650 TABLET ORAL EVERY 6 HOURS PRN
Status: DISCONTINUED | OUTPATIENT
Start: 2018-05-01 | End: 2018-05-01

## 2018-05-01 RX ORDER — HYDROCODONE BITARTRATE AND ACETAMINOPHEN 5; 325 MG/1; MG/1
1-2 TABLET ORAL EVERY 6 HOURS PRN
Status: DISCONTINUED | OUTPATIENT
Start: 2018-05-01 | End: 2018-05-03

## 2018-05-01 RX ORDER — CEFTRIAXONE 1 G/1
1 INJECTION, POWDER, FOR SOLUTION INTRAMUSCULAR; INTRAVENOUS ONCE
Status: COMPLETED | OUTPATIENT
Start: 2018-05-01 | End: 2018-05-01

## 2018-05-01 RX ORDER — LINEZOLID 600 MG/1
600 TABLET, FILM COATED ORAL 2 TIMES DAILY
COMMUNITY
Start: 2018-04-21 | End: 2018-05-01

## 2018-05-01 RX ORDER — GUAIFENESIN/DEXTROMETHORPHAN 100-10MG/5
5 SYRUP ORAL EVERY 6 HOURS PRN
Status: DISCONTINUED | OUTPATIENT
Start: 2018-05-01 | End: 2018-05-08 | Stop reason: HOSPADM

## 2018-05-01 RX ORDER — IBUPROFEN 400 MG/1
400 TABLET ORAL EVERY 6 HOURS PRN
COMMUNITY
End: 2018-06-06

## 2018-05-01 RX ORDER — DOXYCYCLINE 100 MG/1
100 TABLET ORAL EVERY 12 HOURS
Status: DISCONTINUED | OUTPATIENT
Start: 2018-05-01 | End: 2018-05-01

## 2018-05-01 RX ORDER — SODIUM CHLORIDE AND POTASSIUM CHLORIDE 150; 450 MG/100ML; MG/100ML
INJECTION, SOLUTION INTRAVENOUS CONTINUOUS
Status: DISCONTINUED | OUTPATIENT
Start: 2018-05-02 | End: 2018-05-08

## 2018-05-01 RX ORDER — SODIUM CHLORIDE 9 MG/ML
30 INJECTION, SOLUTION INTRAVENOUS ONCE
Status: COMPLETED | OUTPATIENT
Start: 2018-05-01 | End: 2018-05-01

## 2018-05-01 RX ORDER — BISACODYL 10 MG
10 SUPPOSITORY, RECTAL RECTAL
Status: DISCONTINUED | OUTPATIENT
Start: 2018-05-01 | End: 2018-05-04

## 2018-05-01 RX ADMIN — DOXYCYCLINE 100 MG: 100 TABLET ORAL at 19:44

## 2018-05-01 RX ADMIN — DOXYCYCLINE 100 MG: 100 TABLET ORAL at 14:42

## 2018-05-01 RX ADMIN — IOHEXOL 60 ML: 350 INJECTION, SOLUTION INTRAVENOUS at 19:30

## 2018-05-01 RX ADMIN — HYDROCODONE BITARTRATE AND ACETAMINOPHEN 2 TABLET: 5; 325 TABLET ORAL at 18:23

## 2018-05-01 RX ADMIN — VANCOMYCIN HYDROCHLORIDE 1200 MG: 100 INJECTION, POWDER, LYOPHILIZED, FOR SOLUTION INTRAVENOUS at 23:12

## 2018-05-01 RX ADMIN — IBUPROFEN 600 MG: 600 TABLET, FILM COATED ORAL at 16:01

## 2018-05-01 RX ADMIN — PIPERACILLIN SODIUM AND TAZOBACTAM SODIUM 3.38 G: 3; .375 INJECTION, POWDER, FOR SOLUTION INTRAVENOUS at 23:44

## 2018-05-01 RX ADMIN — SODIUM CHLORIDE 1404 ML: 9 INJECTION, SOLUTION INTRAVENOUS at 12:21

## 2018-05-01 RX ADMIN — POTASSIUM CHLORIDE AND SODIUM CHLORIDE: 450; 150 INJECTION, SOLUTION INTRAVENOUS at 23:37

## 2018-05-01 RX ADMIN — CEFTRIAXONE SODIUM 1 G: 1 INJECTION, POWDER, FOR SOLUTION INTRAMUSCULAR; INTRAVENOUS at 12:21

## 2018-05-01 ASSESSMENT — ENCOUNTER SYMPTOMS
EYE PAIN: 0
VOMITING: 0
WHEEZING: 1
HALLUCINATIONS: 0
POLYDIPSIA: 0
BRUISES/BLEEDS EASILY: 0
SPUTUM PRODUCTION: 1
HEARTBURN: 0
DOUBLE VISION: 0
CHILLS: 0
MEMORY LOSS: 0
WEAKNESS: 0
SEIZURES: 0
SHORTNESS OF BREATH: 1
NECK PAIN: 0
ABDOMINAL PAIN: 1
SINUS PAIN: 0
DIZZINESS: 0
WEIGHT LOSS: 0
HEADACHES: 0
SENSORY CHANGE: 0
BACK PAIN: 1
TREMORS: 0
NERVOUS/ANXIOUS: 0
WHEEZING: 0
ORTHOPNEA: 0
DIARRHEA: 0
DEPRESSION: 0
COUGH: 1
NAUSEA: 0
MYALGIAS: 0
CLAUDICATION: 0
CONSTIPATION: 0
EYE REDNESS: 0
SPEECH CHANGE: 0
DIAPHORESIS: 0
PND: 0
SORE THROAT: 0
LOSS OF CONSCIOUSNESS: 0
BLURRED VISION: 0
PALPITATIONS: 0
FEVER: 0
HEMOPTYSIS: 0

## 2018-05-01 ASSESSMENT — LIFESTYLE VARIABLES
HAVE PEOPLE ANNOYED YOU BY CRITICIZING YOUR DRINKING: NO
TOTAL SCORE: 0
ALCOHOL_USE: YES
AVERAGE NUMBER OF DAYS PER WEEK YOU HAVE A DRINK CONTAINING ALCOHOL: 3
HOW MANY TIMES IN THE PAST YEAR HAVE YOU HAD 5 OR MORE DRINKS IN A DAY: 5
TOTAL SCORE: 0
EVER HAD A DRINK FIRST THING IN THE MORNING TO STEADY YOUR NERVES TO GET RID OF A HANGOVER: NO
EVER_SMOKED: NEVER
EVER FELT BAD OR GUILTY ABOUT YOUR DRINKING: NO
CONSUMPTION TOTAL: POSITIVE
TOTAL SCORE: 0
HAVE YOU EVER FELT YOU SHOULD CUT DOWN ON YOUR DRINKING: NO
EVER_SMOKED: NEVER
ON A TYPICAL DAY WHEN YOU DRINK ALCOHOL HOW MANY DRINKS DO YOU HAVE: 2

## 2018-05-01 ASSESSMENT — COPD QUESTIONNAIRES
HAVE YOU SMOKED AT LEAST 100 CIGARETTES IN YOUR ENTIRE LIFE: NO/DON'T KNOW
DURING THE PAST 4 WEEKS HOW MUCH DID YOU FEEL SHORT OF BREATH: NONE/LITTLE OF THE TIME
DO YOU EVER COUGH UP ANY MUCUS OR PHLEGM?: NO/ONLY WITH OCCASIONAL COLDS OR INFECTIONS
DURING THE PAST 4 WEEKS HOW MUCH DID YOU FEEL SHORT OF BREATH: MOST  OR ALL OF THE TIME
DO YOU EVER COUGH UP ANY MUCUS OR PHLEGM?: NO/ONLY WITH OCCASIONAL COLDS OR INFECTIONS
COPD SCREENING SCORE: 0
IN THE PAST 12 MONTHS DO YOU DO LESS THAN YOU USED TO BECAUSE OF YOUR BREATHING PROBLEMS: AGREE
HAVE YOU SMOKED AT LEAST 100 CIGARETTES IN YOUR ENTIRE LIFE: NO/DON'T KNOW

## 2018-05-01 ASSESSMENT — PAIN SCALES - GENERAL
PAINLEVEL_OUTOF10: 5
PAINLEVEL_OUTOF10: 5
PAINLEVEL_OUTOF10: 7
PAINLEVEL_OUTOF10: 0

## 2018-05-01 ASSESSMENT — PATIENT HEALTH QUESTIONNAIRE - PHQ9
2. FEELING DOWN, DEPRESSED, IRRITABLE, OR HOPELESS: NOT AT ALL
SUM OF ALL RESPONSES TO PHQ9 QUESTIONS 1 AND 2: 0
1. LITTLE INTEREST OR PLEASURE IN DOING THINGS: NOT AT ALL

## 2018-05-01 NOTE — PROGRESS NOTES
Report received by NOC RN. Assumed care of pt. Assessment complete. Boyfriend at bedside. Pt A&O x 4. Pt c/o left sided chest pain associated with breathing-will medicate per MAR, speaks in 3-4 word sentences-has dyspnea with ambulation, and sats approprietly on room air.  IS use encouraged. Pt in no apparent signs of distress. Plan of care discussed. Call light in reach,  bed in lowest position, and pt has no further questions at this time.

## 2018-05-01 NOTE — ASSESSMENT & PLAN NOTE
- Shown by portable x-ray  - Most likely due to resolving pneumonia  - Ordered two-views chest x-ray   - Might consider repeating back strain 2 days

## 2018-05-01 NOTE — DISCHARGE SUMMARY
Internal Medicine Discharge Summary  Note Author: Ignacio Resendiz M.D.       Admit Date:  5/1/2018       Discharge Date:   5/8/2018      Service:   R Internal Medicine Yellow Team  Attending Physician(s):   Dr. Bhatia / Dr. Hollingsworth       Senior Resident(s):   Dr. Gil/ Dr. Pompa  Keith Resident(s):   Dr. Resendiz      Primary Diagnosis:   Pneumonia and empyema    Secondary Diagnoses:                Principal Problem:    Empyema of left pleural space (HCC) POA: Yes      Overview: 5/2 Thoracoscopy with decortication, 2 chest tubes placed.      5/4 Chest tubes to water seal.      5/5 Apical chest tube removed.      5/6 Basilar chest tube removed.      Yimi Rivera MD. General surgery.  Resolved Problems:    PNA (pneumonia) POA: Yes    Pleural effusion on left POA: Yes    Thrombocytosis (HCC) POA: Yes      Hospital Summary (Brief Narrative):       Maritza Swann is a 22 y.o. Female referred to the ED by her PCP, she was admitted to [facility] 2 weeks ago for staph pneumonia post influenza infection treated with linezolid and completed her treatment one day earlier, she was complaining of increasing shortness of breath and chest pain, vital signs were stable, O2 saturation 98%, CT scan showed Bilateral patchy opacities with cavitation, likely related to multifocal pneumonia given history of MRSA pneumonia. More confluent opacity in the medial left upper lobe contains a loculated fluid consistent with necrosis or abscess formation.  Low-density left pleural effusion. Enhancing pleura suggest early empyema formation.  Surgery consulted, did thoracoscopy with decortication and 2 chest tubes placement over the left side, patient was started on Vanco and Zosyn, postop patient condition improved, first chest tube was removed 3 days post procedure, followed by removal of the other chest tube next day, recheck x-rays were normal, ID consulted Dr. Leger, cultures were negative during this  admission, Dr. Leger reviewed the culture and sensitivities from [facility], her antibiotics were switched to clindamycin, she had IV clindamycin in the hospital for 2 days without complication, discharged home on oral clindamycin 300 mg twice daily, Tylenol and and ibuprofen with as needed oxycodone for pain management, Zofran as needed for nausea.  Patient has follow-up appointment at [facility] in 2 weeks, will repeat chest x-ray before follow-up appointment, order already placed, will continue antibiotic until the next appointment and will talk to Dr. Leger in the next appointment to check his recommendation for how long the patient will be on clindamycin.  Patient was given opiate prescription for 10 days, ORT score 1, informed consent signed by patient.  Opioid Risk Score: 1    Interpretation of Opioid Risk Score   Score 0-3 = Low risk of abuse. Do UDS at least once per year.  Score 4-7 = Moderate risk of abuse. Do UDS 1-4 times per year.  Score 8+ = High risk of abuse. Refer to specialist.    Also patient was found to be anemic, iron panel consistent with iron deficiency anemia, she was given IV iron replacement with 5 doses of iron sucrose.    Patient /Hospital Summary (Details -- Problem Oriented) :          PNA (pneumonia)-resolved as of 5/7/2018   Assessment & Plan    -Recent history of staph pneumonia post flu, treated with linezolid, initially better and then worse, presented with 2 days history of increasing left-sided chest pain and shortness of breath.  - CTA chest Bilateral patchy opacities with cavitation, left much greater than right, are likely related to multifocal pneumonia given history of MRSA pneumonia. More confluent opacity in the medial left upper lobe contains a loculated fluid consistent with necrosis or abscess formation. Low-density left pleural effusion. Enhancing pleura suggest early empyema formation.   Plan:  - Blood cultures X2 NGTD, Pleural tissue Cx NGTD  - on  Vanc. Zosyn DCed 5/6/2018  - Surgery was consulted, Dr Rivera did thoracoscopy, decortication, drainage of pleural effusion with 2 chest tubes on 5/2.  - CT functioning, suction disconnected.   -Requested copy of medical records from [facility]           * Empyema of left pleural space (HCC)   Assessment & Plan    - Surgery was consulted, Dr Rivera did thoracoscopy, decortication, drainage of pleural effusion with 2 chest tubes on 5/2.  - on Vanc  - There is subcutaneous emphysema.   - tissue cultures pending, so far cultures are negative  - First tube DCed in 5/5, 2nd tube DCed in 5/6  - started on Vanc and Zosyn. Zosyn DCed 5/6/2018, Vanc switched to Clindamycin 5/7/2018              Pleural effusion on left-resolved as of 5/2/2018   Assessment & Plan    - Shown by portable x-ray  - Most likely due to resolving pneumonia  - Ordered two-views chest x-ray   - Might consider repeating back strain 2 days        Thrombocytosis (HCC)-resolved as of 5/8/2018   Assessment & Plan    - Platelet count 726  - Possibly due to dehydration or reactive  - Monitor platelet count              Consultants:     Vascular surgery  Infectious disease    Procedures:        Thoracoscopy with decortication    Imaging/ Testing:      DX-CHEST-PORTABLE (1 VIEW)   Final Result      Airspace opacities in the left mid and lower lung zone are not significantly changed.      Right parahilar opacity is improved.      Soft tissue air is again noted on the left.         DX-CHEST-PORTABLE (1 VIEW)   Final Result      1.  There is no pneumothorax.   2.  Mild left pleural effusion.   3.  Interval removal of right-sided chest tube.   4.  Linear right mid zone atelectasis.   5.  Possible airspace process in the left lower lung.      DX-CHEST-PORTABLE (1 VIEW)   Final Result         1.  Hazy bilateral lower lobe infiltrates, stable.   2.  Trace right pleural effusion   3.  Soft tissue gas in the left chest wall.         DX-CHEST-PORTABLE (1 VIEW)    Final Result         1.  Hazy bilateral lower lobe infiltrates, stable.   2.  Soft tissue gas in the left chest wall.      DX-CHEST-PORTABLE (1 VIEW)   Final Result         1.  Hazy bilateral lower lobe infiltrates, stable.   2.  Soft tissue gas in the left chest wall.      DX-CHEST-PORTABLE (1 VIEW)   Final Result      1.  No pneumothorax identified with 2 left chest tubes in place.      2.  Unchanging left lower lobe opacity.      3.  Development of minimal atelectasis or edema in the right lower lobe.      DX-CHEST-PORTABLE (1 VIEW)   Final Result      1.  No pneumothorax identified with 2 left chest tubes in place.      2.  Residual bibasilar opacities which may represent edema or atelectasis or contusion in the left lower lobe.      3.  No new pulmonary consolidation.      DX-CHEST-LIMITED (1 VIEW)   Final Result      1.  Interval placement of 2 left chest tubes.      2.  Bilateral infiltrates, left greater than right.      DX-CHEST-2 VIEWS   Final Result      1.  There is increasing left lower lobe opacity with a trace of left pleural fluid which is consistent with pneumonia.   2.  Other subtle cavitary processes in the upper lobes are also again seen.      CT-CTA CHEST PULMONARY ARTERY W/ RECONS   Final Result      1.  Bilateral patchy opacities with cavitation, left much greater than right, are likely related to multifocal pneumonia given history of MRSA pneumonia. More confluent opacity in the medial left upper lobe contains a loculated fluid consistent with    necrosis or abscess formation.      2.  Low-density left pleural effusion. Enhancing pleura suggest early empyema formation.            DX-CHEST-PORTABLE (1 VIEW)   Final Result      LEFT lung base atelectasis versus developing pneumonia with possible small associated pleural effusion.      DX-CHEST-2 VIEWS    (Results Pending)         Discharge Medications:         Medication Reconciliation: Completed       Medication List      START taking these  medications      Instructions   clindamycin 300 MG Caps  Commonly known as:  CLEOCIN   Take 1 Cap by mouth 3 times a day for 21 days.  Dose:  300 mg     ondansetron 4 MG Tbdp  Commonly known as:  ZOFRAN ODT   Take 1 Tab by mouth every 8 hours as needed for Nausea.  Dose:  4 mg     oxyCODONE immediate-release 5 MG Tabs  Commonly known as:  ROXICODONE   Take 1 Tab by mouth every 8 hours as needed for Severe Pain for up to 10 days.  Dose:  5 mg        CONTINUE taking these medications      Instructions   acetaminophen 500 MG Tabs  Commonly known as:  TYLENOL   Take 2 Tabs by mouth 3 times a day.  Dose:  1000 mg     ibuprofen 400 MG Tabs  Commonly known as:  MOTRIN   Take 400 mg by mouth every 6 hours as needed.  Dose:  400 mg        STOP taking these medications    guaifenesin 200 MG tablet            Can use .DISCHARGEMEDSLIST if going to another facility         Disposition:   Home    Diet:   As tolerated, regular diet    Activity:   As tolerated    Instructions:      -Please continue taking the antibiotics  -Please do the chest x-ray before the next follow-up appointment  The patient was instructed to return to the ER in the event of worsening symptoms. I have counseled the patient on the importance of compliance and the patient has agreed to proceed with all medical recommendations and follow up plan indicated above.   The patient understands that all medications come with benefits and risks. Risks may include permanent injury or death and these risks can be minimized with close reassessment and monitoring.        Primary Care Provider:       Discharge summary faxed to primary care provider:  Completed  Copy of discharge summary given to the patient: Completed      Follow up appointment details :      -Follow-up with PCP May 24, 2018    Pending Studies:        None    Time spent on discharge day patient visit, preparing discharge paperwork and arranging for patient follow up.    Summary of follow up issues:      -Length of treatment of antibiotics  -Anemia  -Establish care with a primary care physician  Discharge Time (Minutes) :    45 minutes  Hospital Course Type:  Inpatient Stay >2 midnights      Condition on Discharge    ______________________________________________________________________    Interval history/exam for day of discharge:    No events overnight, patient condition improved, pain well controlled with pain medications, mild nausea controlled with Zofran, patient is ready for discharge, she will receive afternoon dose of IV clindamycin before leaving the hospital, patient is aware of the plan and she will follow-up in 2 weeks, repeated chest x-ray today showed improvement compared to the last one.    Vitals:    05/07/18 1630 05/07/18 2005 05/08/18 0428 05/08/18 0748   BP: 128/72 138/86 127/89 132/79   Pulse: 87 82 82 73   Resp: 20 18 17 17   Temp: 36.7 °C (98 °F) 37 °C (98.6 °F) 36.2 °C (97.2 °F) 36.3 °C (97.4 °F)   SpO2: 97% 98% 95% 97%   Weight:       Height:         Weight/BMI: Body mass index is 20.27 kg/m².  Pulse Oximetry: 97 %, O2 (LPM): 0, O2 Delivery: None (Room Air)    Physical Exam   Constitutional: No distress.   Cardiovascular: Normal rate.  Exam reveals no gallop and no friction rub.    No murmur heard.  Pulmonary/Chest: Effort normal. No respiratory distress. She has no wheezes. She has no rales.   Left side subcutaneous emphysema   Skin: She is not diaphoretic.       Most Recent Labs:    Lab Results   Component Value Date/Time    WBC 9.3 05/08/2018 12:41 AM    RBC 3.13 (L) 05/08/2018 12:41 AM    HEMOGLOBIN 9.7 (L) 05/08/2018 12:41 AM    HEMATOCRIT 31.2 (L) 05/08/2018 12:41 AM    MCV 99.7 (H) 05/08/2018 12:41 AM    MCH 31.0 05/08/2018 12:41 AM    MCHC 31.1 (L) 05/08/2018 12:41 AM    MPV 9.3 05/08/2018 12:41 AM    NEUTSPOLYS 58.80 05/08/2018 12:41 AM    LYMPHOCYTES 28.50 05/08/2018 12:41 AM    MONOCYTES 7.80 05/08/2018 12:41 AM    EOSINOPHILS 3.70 05/08/2018 12:41 AM    BASOPHILS 0.30  05/08/2018 12:41 AM      Lab Results   Component Value Date/Time    SODIUM 139 05/08/2018 12:41 AM    POTASSIUM 4.4 05/08/2018 12:41 AM    CHLORIDE 101 05/08/2018 12:41 AM    CO2 29 05/08/2018 12:41 AM    GLUCOSE 86 05/08/2018 12:41 AM    BUN 8 05/08/2018 12:41 AM    CREATININE 0.58 05/08/2018 12:41 AM      Lab Results   Component Value Date/Time    ALTSGPT 10 05/08/2018 12:41 AM    ASTSGOT 13 05/08/2018 12:41 AM    ALKPHOSPHAT 62 05/08/2018 12:41 AM    TBILIRUBIN 0.3 05/08/2018 12:41 AM    ALBUMIN 3.2 05/08/2018 12:41 AM    GLOBULIN 3.9 (H) 05/08/2018 12:41 AM     No results found for: PROTHROMBTM, INR     Ignacio Resenidz M.D.    The patient was seen by me face to face. I personally had a discussion with the patient. The case was discussed with our resident team, I examined the patient and confirmed the essential components of the history, physical examination, diagnosis and treatment plan as needed. I agree with the patient care as documented by the resident and edited as above by me. See resident's note above for complete details of service. The overall treatment regimen will be carried out as described above.     Thank you:  Genaro Hollingsworth MD, FACP  [facility] Internal Medicine  Pager: Use Tiger Text (use resident info under treatment team for day to day floor issues)  Office: [phone] Ext. [phone]  Fax: [phone] (non PHI only)

## 2018-05-01 NOTE — ASSESSMENT & PLAN NOTE
- Unknown cause possibly pneumonia versus pleural effusion.   - Vital signs normal except for tachypnea  - PERC score 0 pulmonary embolism can be ruled out, will order d-dimer

## 2018-05-01 NOTE — ED NOTES
Med rec complete per Pt and [facility]@[phone]   Allergies reviewed  Pt completed a 9 day course of Linezolid on 4/30

## 2018-05-01 NOTE — PROGRESS NOTES
Dr. Resendiz alerted pt [id number]=[id number].  States will order CTA.  Pt continues to c/o pain in left lungs-Dr. Resendiz alerted states will order norco prn.

## 2018-05-01 NOTE — H&P
Internal Medicine Admitting History and Physical    Note Author: Ignacio Huber M.D.       Name Maritza Swann     1995   Age/Sex 22 y.o. female   MRN [id number]   Code Status Full Code      After 5PM or if no immediate response to page, please call for cross-coverage  Attending/Team: Dr. Bhatia / Otto  See Patient List for primary contact information  Call [phone] to page    1st Call - Day Intern (R1):   Dr. Huber 2nd Call - Day Sr. Resident (R2/R3):   Dr. Gil        Chief Complaint:  Shortness of breath    HPI:  Maritza Swann is a 22 y.o. Female with past medical history of depression, endometriosis and ovarian cyst referred to the ED by her primary care physician because of shortness of breath, she was admitted to [facility] (formerly [facility] in , diagnosed with staph pneumonia post-flu infection, she was treated with linezolid, completed her treatment yesterday. Patient note 2 days of increasing shortness of breath and chest pain. Pain over the left side and back, sharp in nature, increases with inspiration, cough and moving the left upper extremity, he is on Tylenol and ibuprofen for pain but it's not helping. Denies fever, chills, hemoptysis and leg pain since discharge. Today she went to her PCP, was found to have tachycardia, O2 saturation was normal at 98% and she was advised by her PCP to go to the hospital.        Review of Systems   Constitutional: Positive for malaise/fatigue. Negative for chills, diaphoresis, fever and weight loss.   HENT: Negative for congestion, ear discharge, ear pain, hearing loss, sinus pain and sore throat.    Eyes: Negative for blurred vision, double vision, pain and redness.   Respiratory: Positive for cough, sputum production and shortness of breath. Negative for hemoptysis and wheezing.    Cardiovascular: Positive for chest pain. Negative for palpitations, orthopnea, claudication, leg swelling and PND.   Gastrointestinal:  Positive for abdominal pain. Negative for constipation, diarrhea, heartburn, melena, nausea and vomiting.   Genitourinary: Negative for dysuria, frequency, hematuria and urgency.   Musculoskeletal: Positive for back pain. Negative for joint pain, myalgias and neck pain.   Skin: Negative for itching and rash.   Neurological: Negative for dizziness, tremors, speech change, seizures, loss of consciousness, weakness and headaches.   Endo/Heme/Allergies: Negative for polydipsia. Does not bruise/bleed easily.   Psychiatric/Behavioral: Negative for depression, hallucinations, memory loss and suicidal ideas. The patient is not nervous/anxious.              Past Medical History:   Past Medical History:   Diagnosis Date   • Asthma     as a child   • Depression    • Endometriosis 2014   • Migraine    • Other specified symptom associated with female genital organs     endometriosis, ovarian cysts   • Ovarian cyst 2014       Past Surgical History:  Past Surgical History:   Procedure Laterality Date   • MAMMOPLASTY AUGMENTATION Bilateral 5/27/2015    Procedure: MAMMOPLASTY AUGMENTATION-SALINE;  Surgeon: Prince Lyn M.D.;  Location: SURGERY [facility];  Service:    • LAPAROSCOPY  7/20/2014   • DENTAL EXTRACTION(S)  2008    wisdom teeth       Current Outpatient Medications:  Home Medications     Reviewed by Raj Novak R.N. (Registered Nurse) on 05/01/18 at 1527  Med List Status: Complete   Medication Last Dose Status   guaifenesin 200 MG tablet 4/30/2018 Active   ibuprofen (MOTRIN) 400 MG Tab 4/30/2018 Active                Medication Allergy/Sensitivities:  No Known Allergies      Family History:  Family History   Problem Relation Age of Onset   • Hypertension Father    • Bipolar disorder Sister    • Other Sister      mental disorder.   • Asthma Brother    • Arthritis Maternal Grandmother    • Diabetes Maternal Grandmother    • Bipolar disorder Maternal Grandfather    • Other Maternal Grandfather      mental  "disorder.   • Diabetes     • Hypertension         Social History:  Social History     Social History   • Marital status: Single     Spouse name: N/A   • Number of children: N/A   • Years of education: N/A     Occupational History   • Not on file.     Social History Main Topics   • Smoking status: Never Smoker   • Smokeless tobacco: Never Used   • Alcohol use Yes      Comment: 2 per week   • Drug use: No   • Sexual activity: Yes     Partners: Male      Comment: none     Other Topics Concern   • Not on file     Social History Narrative   • No narrative on file     Living situation: Lives at home with child  PCP : Dr. Foster.         Physical Exam     Vitals:    05/01/18 1400 05/01/18 1430 05/01/18 1453 05/01/18 1524   BP:       Pulse: 94 84 97    Resp: (!) 23 20 17    Temp:       SpO2: 99% 98% 99%    Weight:    48.6 kg (107 lb 2.3 oz)   Height:    1.6 m (5' 3\")     Body mass index is 18.98 kg/m².  /74   Pulse 97   Temp 36.7 °C (98 °F)   Resp 17   Ht 1.6 m (5' 3\")   Wt 48.6 kg (107 lb 2.3 oz)   SpO2 99%   Breastfeeding? No   BMI 18.98 kg/m²   O2 therapy: Pulse Oximetry: 99 %, O2 (LPM): 0, FiO2%: 21 %    Physical Exam   Constitutional: She is oriented to person, place, and time. No distress.   Looks short of breath when talking   HENT:   Head: Normocephalic and atraumatic.   Eyes: Pupils are equal, round, and reactive to light. No scleral icterus.   Neck: Normal range of motion.   Cardiovascular: Normal rate and normal heart sounds.  Exam reveals no gallop and no friction rub.    No murmur heard.  Pulmonary/Chest: Effort normal. No accessory muscle usage. No respiratory distress. She has decreased breath sounds in the left middle field and the left lower field. She has no wheezes. She has no rales. She exhibits tenderness.   Increased dullness over the left side   Abdominal: Soft. She exhibits no distension. There is no tenderness. There is no rebound.   Musculoskeletal: She exhibits tenderness (left upper " back). She exhibits no edema.   Lymphadenopathy:     She has no cervical adenopathy.   Neurological: She is alert and oriented to person, place, and time. No cranial nerve deficit. Coordination normal.   Skin: No rash noted. She is not diaphoretic. No erythema. No pallor.   Psychiatric: Mood, affect and judgment normal.             Data Review       Old Records Request:   Completed  Current Records review and summary: Completed    Lab Data Review:  Recent Results (from the past 24 hour(s))   Lactic acid (lactate)    Collection Time: 05/01/18 10:52 AM   Result Value Ref Range    Lactic Acid 1.7 0.5 - 2.0 mmol/L   CBC WITH DIFFERENTIAL    Collection Time: 05/01/18 10:52 AM   Result Value Ref Range    WBC 10.7 4.8 - 10.8 K/uL    RBC 3.77 (L) 4.20 - 5.40 M/uL    Hemoglobin 12.0 12.0 - 16.0 g/dL    Hematocrit 36.0 (L) 37.0 - 47.0 %    MCV 95.5 81.4 - 97.8 fL    MCH 31.8 27.0 - 33.0 pg    MCHC 33.3 (L) 33.6 - 35.0 g/dL    RDW 44.7 35.9 - 50.0 fL    Platelet Count 726 (H) 164 - 446 K/uL    MPV 8.9 (L) 9.0 - 12.9 fL    Neutrophils-Polys 75.30 (H) 44.00 - 72.00 %    Lymphocytes 16.70 (L) 22.00 - 41.00 %    Monocytes 6.90 0.00 - 13.40 %    Eosinophils 0.10 0.00 - 6.90 %    Basophils 0.60 0.00 - 1.80 %    Immature Granulocytes 0.40 0.00 - 0.90 %    Nucleated RBC 0.00 /100 WBC    Neutrophils (Absolute) 8.03 (H) 2.00 - 7.15 K/uL    Lymphs (Absolute) 1.78 1.00 - 4.80 K/uL    Monos (Absolute) 0.73 0.00 - 0.85 K/uL    Eos (Absolute) 0.01 0.00 - 0.51 K/uL    Baso (Absolute) 0.06 0.00 - 0.12 K/uL    Immature Granulocytes (abs) 0.04 0.00 - 0.11 K/uL    NRBC (Absolute) 0.00 K/uL   COMP METABOLIC PANEL    Collection Time: 05/01/18 10:52 AM   Result Value Ref Range    Sodium 137 135 - 145 mmol/L    Potassium 4.0 3.6 - 5.5 mmol/L    Chloride 100 96 - 112 mmol/L    Co2 27 20 - 33 mmol/L    Anion Gap 10.0 0.0 - 11.9    Glucose 84 65 - 99 mg/dL    Bun 18 8 - 22 mg/dL    Creatinine 0.61 0.50 - 1.40 mg/dL    Calcium 9.8 8.5 - 10.5 mg/dL     AST(SGOT) 18 12 - 45 U/L    ALT(SGPT) 33 2 - 50 U/L    Alkaline Phosphatase 71 30 - 99 U/L    Total Bilirubin 0.7 0.1 - 1.5 mg/dL    Albumin 4.0 3.2 - 4.9 g/dL    Total Protein 8.4 (H) 6.0 - 8.2 g/dL    Globulin 4.4 (H) 1.9 - 3.5 g/dL    A-G Ratio 0.9 g/dL   ESTIMATED GFR    Collection Time: 05/01/18 10:52 AM   Result Value Ref Range    GFR If African American >60 >60 mL/min/1.73 m 2    GFR If Non African American >60 >60 mL/min/1.73 m 2   BETA-HCG QUALITATIVE SERUM    Collection Time: 05/01/18 10:52 AM   Result Value Ref Range    Beta-Hcg Qualitative Serum Negative Negative   URINALYSIS    Collection Time: 05/01/18  1:28 PM   Result Value Ref Range    Color Yellow     Character Clear     Specific Gravity 1.012 <1.035    Ph >=9.0 (A) 5.0 - 8.0    Glucose Negative Negative mg/dL    Ketones Negative Negative mg/dL    Protein Negative Negative mg/dL    Bilirubin Negative Negative    Urobilinogen, Urine 0.2 Negative    Nitrite Negative Negative    Leukocyte Esterase Negative Negative    Occult Blood Negative Negative    Micro Urine Req see below        Imaging/Procedures Review:    ndependant Imaging Review: Completed  DX-CHEST-PORTABLE (1 VIEW)   Final Result      LEFT lung base atelectasis versus developing pneumonia with possible small associated pleural effusion.      DX-CHEST-2 VIEWS    (Results Pending)             EKG:   EKG Independant Review: Deferred EKG pending   QTc: , HR:  , Normal Sinus Rhythm, no ST/T changes      ( ) Records reviewed and summarized in current documentation             Assessment/Plan     * PNA (pneumonia)- (present on admission)   Assessment & Plan    - Hx: 31 years old female, diagnosed with staph pneumonia post flu, treated with linezolid,completedtreatmenthistorythe presented with 2 days history of increasing left-sided chest pain and shortness of breath.  - Physical exam: Tachypnea, Tenderness over the left lateral chest wall and back, decreased air entry on the left side.  - Labs:  Lactic acid 1.7, WBC count 10.7, neutrophils 75.3%  - Imaging: Chest x-ray showed left lung base atelectasis and possible small pleural effusion  - Possibly pneumonia versus pleuritis post infection with musculoskeletal pain due to cough  - CURB 65 score 0, 0.6% 30 days mortality  Plan:  - Blood cultures X2 pending  - D-dimer pending  - Urine culture pending  - Order chest x-ray 2 views  - Order pro calcitonin  - Start doxycycline to cover atypical and pneumonia And MRSA  - Monitor vital signs        Pleural effusion on left- (present on admission)   Assessment & Plan    - Shown by portable x-ray  - Most likely due to resolving pneumonia  - Ordered two-views chest x-ray   - Might consider repeating back strain 2 days        Shortness of breath   Assessment & Plan    - Unknown cause possibly pneumonia versus pleural effusion and possible pulmonary embolism.  - Vital signs normal except for tachypnea  - PERC score 0 pulmonary embolism can be ruled out, will order d-dimer        Thrombocytosis (HCC)   Assessment & Plan    - Platelet count 726  - Possibly due to dehydration or reactive  - Monitor platelet count              Anticipated Hospital stay:  >2 midnights        Quality Measures  Quality-Core Measures   Reviewed items::  Labs reviewed, Medications reviewed and Radiology images reviewed  Haas catheter::  No Haas  DVT prophylaxis pharmacological::  Enoxaparin (Lovenox)  Ulcer Prophylaxis::  Not indicated  Antibiotics:  Treating active infection/contamination beyond 24 hours perioperative coverage

## 2018-05-01 NOTE — LETTER
[facility]  Pcp Pt States None  No address on file  Fax: [phone]   Authorization for Release/Disclosure of   Protected Health Information   Name: MARITZA SWANN : 1995 SSN: [id number]   Address: [address] [place] [address]  [place] [address] Phone:    [phone] (home)    I authorize the entity listed below to release/disclose the PHI below to:   [facility]/Pcp Pt States None and No name on file.   Provider or Entity Name:     Address   City, State, [facility]   Phone:      Fax:     Reason for request: continuity of care   Information to be released:    [  ] LAST COLONOSCOPY,  including any PATH REPORT and follow-up  [  ] LAST FIT/COLOGUARD RESULT [  ] LAST DEXA  [  ] LAST MAMMOGRAM  [  ] LAST PAP  [  ] LAST LABS [  ] RETINA EXAM REPORT  [  ] IMMUNIZATION RECORDS  [  ] Release all info      [  ] Check here and initial the line next to each item to release ALL health information INCLUDING  _____ Care and treatment for drug and / or alcohol abuse  _____ HIV testing, infection status, or AIDS  _____ Genetic Testing    DATES OF SERVICE OR TIME PERIOD TO BE DISCLOSED: _____________  I understand and acknowledge that:  * This Authorization may be revoked at any time by you in writing, except if your health information has already been used or disclosed.  * Your health information that will be used or disclosed as a result of you signing this authorization could be re-disclosed by the recipient. If this occurs, your re-disclosed health information may no longer be protected by State or Federal laws.  * You may refuse to sign this Authorization. Your refusal will not affect your ability to obtain treatment.  * This Authorization becomes effective upon signing and will  on (date) __________.      If no date is indicated, this Authorization will  one (1) year from the signature date.    Name: Maritza Swann    Signature:   Date:     2018       PLEASE FAX REQUESTED RECORDS BACK TO: [phone]  [phone]

## 2018-05-01 NOTE — SENIOR ADMIT NOTE
A 21 yo F recently discharged from [facility] after admission with MRSA pneumonia, referred by PCP to ER after hospital follow up appointment.   She had flu about 3 weeks ago, had pneumonia and admitted to [facility] on 4/16/18 for a week. She said she had MSSA pneumonia, no bacteria in blood. She was discharged on linezolid for 9 days (finished on 4/30). She was getting better initially, but she got worse again in the past 3 days with worsening SOB and left sided rib pain.     She denies any fever, chills, productive cough. Her cough is dry and chest pain on left side with cough or lying down on left side. No known lung disease or medical problems except for endometriosis. Taking tylenol and ibuprofen alternately for pain.  She is tolerating regular diet and ambulating although mobilization is limited by SOB.    Physical Exam  General: Alert and oriented, No apparent distress.  Eyes: Pupils are equal and reactive. No scleral icterus.  Neck: Supple.   Lungs: Clear to auscultation bilaterally without any wheezing, crepitations. Reduced air entry on LLL.  Cardiovascular: Regular rate and rhythm. No murmurs, rubs or gallops.  Abdomen: Bowel sound +, soft, non tender, no rebound or guarding, no palpable organomegaly  Extremities: No clubbing, cyanosis, edema.  Neuro: A & O x 3. Normal speech and memory. Motor and sensory grossly normal.     # Shortness of breath  # recent MRSA pneumonia  SIRS 2/4 (RR, HR), no leucocytosis.  CXR showed LL infiltrate with small pleural effusion.  - received IVF 30ml/kg and ceftriaxone at ER.  - oral doxy to cover MSSA and atypical bacteria  - procalcitonin came back neg, less likely worsening infection  - BNP normal, D Dimer came back high > 2000, CT PE stat ordered, pending.    # left sided rib pain  - musculoskeletal in nature  - tylenol and ibuprofen prn    Code: full  DVT prophylaxis: lovenox

## 2018-05-01 NOTE — ED TRIAGE NOTES
Chief Complaint   Patient presents with   • Sent by MD   • Shortness of Breath     Pt ambulated to triage, she was recently d/c from hospital for pneumonia sepsis and completed her abx yesterday.   She went to her pcp today for f/u, noted abnormal lung sounds.  Pt speaking 3-4words per sentence.   Sepsis score=3  Protocol initiated, blood sent to lab

## 2018-05-01 NOTE — CARE PLAN
Problem: Infection  Goal: Will remain free from infection    Intervention: Assess signs and symptoms of infection  Pt lab values monitored, VS monitored. No new s/s infection.         Problem: Pain Management  Goal: Pain level will decrease to patient's comfort goal    Intervention: Follow pain managment plan developed in collaboration with patient and Interdisciplinary Team  Passed motrin for lung pain prn

## 2018-05-01 NOTE — ASSESSMENT & PLAN NOTE
-Recent history of staph pneumonia post flu, treated with linezolid, initially better and then worse, presented with 2 days history of increasing left-sided chest pain and shortness of breath.  - CTA chest Bilateral patchy opacities with cavitation, left much greater than right, are likely related to multifocal pneumonia given history of MRSA pneumonia. More confluent opacity in the medial left upper lobe contains a loculated fluid consistent with necrosis or abscess formation. Low-density left pleural effusion. Enhancing pleura suggest early empyema formation.   Plan:  - Blood cultures X2 NGTD, Pleural tissue Cx NGTD  - on Vanc. Zosyn DCed 5/6/2018  - Surgery was consulted, Dr Rivera did thoracoscopy, decortication, drainage of pleural effusion with 2 chest tubes on 5/2.  - CT functioning, suction disconnected.   -Requested copy of medical records from [facility]

## 2018-05-01 NOTE — ED PROVIDER NOTES
ED Provider Note    Scribed for Robert Orosco M.D. by Yimi Hernandez. 5/1/2018  11:40 AM    Means of arrival: Walk-in  History obtained from: Patient  History limited by: None    CHIEF COMPLAINT  Chief Complaint   Patient presents with   • Sent by MD   • Shortness of Breath       HPI  Maritza Swann is a 22 y.o. female who presents to the Emergency Department complaining of shortness of breath that began 10 days ago. She was admitted to [facility] at onset for management of sepsis and pneumonia and discharged with an unspecified antibiotics course, which she finished yesterday. Her pain persisted prompting her to present here today. The patient reports associated left sided rib pain and dyspnea. Patient reports laying on her left side exacerbates both her shortness of breath and rib pain. She denies abdominal pain or fever.    REVIEW OF SYSTEMS  Pertinent negatives include no abdominal pain or fever. As above, all other systems reviewed and are negative.   See HPI for further details.   C    PAST MEDICAL HISTORY   has a past medical history of Asthma; Depression; Endometriosis (2014); Migraine; Other specified symptom associated with female genital organs; and Ovarian cyst (2014).    SURGICAL HISTORY   has a past surgical history that includes dental extraction(s) (2008); laparoscopy (7/20/2014); and mammoplasty augmentation (Bilateral, 5/27/2015).    SOCIAL HISTORY  Social History   Substance Use Topics   • Smoking status: Never Smoker   • Smokeless tobacco: Never Used   • Alcohol use Yes      Comment: 2 per week      History   Drug Use No       FAMILY HISTORY  Family History   Problem Relation Age of Onset   • Diabetes     • Hypertension     • Hypertension Father    • Bipolar disorder Sister    • Other Sister      mental disorder.   • Asthma Brother    • Arthritis Maternal Grandmother    • Diabetes Maternal Grandmother    • Bipolar disorder Maternal Grandfather    • Other Maternal Grandfather       "mental disorder.       CURRENT MEDICATIONS  No current facility-administered medications on file prior to encounter.      No current outpatient prescriptions on file prior to encounter.       ALLERGIES  No Known Allergies    PHYSICAL EXAM  VITAL SIGNS: /74   Pulse (!) 110   Temp 36.7 °C (98 °F)   Resp 16   Ht 1.6 m (5' 3\")   Wt 46.8 kg (103 lb 2.8 oz)   SpO2 99%   BMI 18.28 kg/m²   Constitutional: Well developed, Well nourished,  Moderate respiratory distress, Non-toxic appearance. Laying on the right lateral decubitus position. Cannot toelrate left lateral decubitus seocondary to shortness of breath  HENT: Normocephalic, Atraumatic, Bilateral external ears normal, Oropharynx is clear mucous membranes are moist. No oral exudates or nasal discharge.   Eyes: Pupils are equal round and reactive, EOMI, Conjunctiva normal, No discharge.   Neck: Normal range of motion, No tenderness, Supple, No stridor. No meningismus.  Lymphatic: No lymphadenopathy noted.   Cardiovascular: Tachycardia, Regular rhythm without murmur rub or gallop.  Thorax & Lungs: Moderate respiratory distress. Diminished breath sounds in the left base, No wheezes, rhonchi or rales. There is no chest wall tenderness.   Abdomen: Soft non-tender non-distended. There is no rebound or guarding. No organomegaly is appreciated. Bowel sounds are normal.  Skin: Normal without rash.   Back: No CVA or spinal tenderness.   Extremities: Intact distal pulses, No edema, No tenderness, No cyanosis, No clubbing. Capillary refill is less than 2 seconds.  Musculoskeletal: Good range of motion in all major joints. No tenderness to palpation or major deformities noted.   Neurologic: Alert & oriented x 3, Normal motor function, Normal sensory function, No focal deficits noted. Reflexes are normal.  Psychiatric: Affect normal, Judgment normal, Mood normal. There is no suicidal ideation or patient reported hallucinations.       DIAGNOSTIC STUDIES / " "PROCEDURES    LABS  Labs Reviewed   CBC WITH DIFFERENTIAL - Abnormal; Notable for the following:        Result Value    RBC 3.77 (*)     Hematocrit 36.0 (*)     MCHC 33.3 (*)     Platelet Count 726 (*)     MPV 8.9 (*)     Neutrophils-Polys 75.30 (*)     Lymphocytes 16.70 (*)     Neutrophils (Absolute) 8.03 (*)     All other components within normal limits   COMP METABOLIC PANEL - Abnormal; Notable for the following:     Total Protein 8.4 (*)     Globulin 4.4 (*)     All other components within normal limits   LACTIC ACID   BLOOD CULTURE    Narrative:     Per Hospital Policy: Only change Specimen Src: to \"Line\" if  specified by physician order.   ESTIMATED GFR   LACTIC ACID   URINALYSIS   URINE CULTURE(NEW)   BLOOD CULTURE   HCG QUAL SERUM      All labs reviewed by me.      RADIOLOGY  DX-CHEST-PORTABLE (1 VIEW)   Final Result      LEFT lung base atelectasis versus developing pneumonia with possible small associated pleural effusion.      DX-CHEST-2 VIEWS    (Results Pending)     The radiologist's interpretation of all radiological studies have been reviewed by me.    COURSE & MEDICAL DECISION MAKING  Nursing notes, VS, PMSFHx reviewed in chart.    11:40 AM Patient seen and examined at bedside. Ordered for DX chest portable 1 view, lactic acid, Beta HCG qualitative serum, estimated GFR, CBC with differential, CMP, U/A, urine culture, and blood cultures x2 to evaluate. Patient was treated with ceftriaxone injection 1 g for her symptoms as well as NS infusion 1404 ml for her tachycardia.      12:30 PM Review of the patient's chest x-ray reveals left pleural effusion with developing pneumonia.  CBC revealed no leukocytosis or significant electrolyte derangements but there is p.m. and shift at 75%. Lactate is 1.7 and unremarkable. Urinalysis does not reveal urinary tract infection as source for chest x-ray does reveal likely source    I rechecked the patient approximately 2:20 PM. She remains in moderate respiratory " distress but does not require any intervention and is maintaining oxygen saturation well. She does have significant risk for decompensation. I don't think she has pulmonary embolism but I am concerned about pleural effusion and pneumonia and therefore we will treat aggressively with IV antibiotics.    CRITICAL CARE  The very real possibility of a deterioration of this patient's condition required the highest level of my preparedness for sudden, emergent intervention.  I provided critical care services, which included medication orders, frequent reevaluations of the patient's condition and response to treatment, ordering and reviewing test results, and discussing the case with various consultants.  The critical care time associated with the care of the patient was 30 minutes. Review chart for interventions. This time is exclusive of any other billable procedures.       DISPOSITION:  Patient will be discharged home in stable condition.    FINAL IMPRESSION  1. Pneumonia of left lower lobe due to infectious organism (HCC)    2. Pleural effusion       4.      Total critical care time of 30 minutes, separate of any billable procedures.     Yimi STILES (Scribe), am scribing for, and in the presence of, Robert Orosco M.D..    Electronically signed by: Yimi Hernandez (Scribe), 5/1/2018    Robert STILES M.D. personally performed the services described in this documentation, as scribed by Yimi Hernandez in my presence, and it is both accurate and complete.    The note accurately reflects work and decisions made by me.  Robert Orosco  5/1/2018  2:26 PM

## 2018-05-02 ENCOUNTER — APPOINTMENT (OUTPATIENT)
Dept: RADIOLOGY | Facility: MEDICAL CENTER | Age: 23
DRG: 163 | End: 2018-05-02
Attending: SURGERY
Payer: MEDICAID

## 2018-05-02 ENCOUNTER — APPOINTMENT (OUTPATIENT)
Dept: RADIOLOGY | Facility: MEDICAL CENTER | Age: 23
DRG: 163 | End: 2018-05-02
Attending: INTERNAL MEDICINE
Payer: MEDICAID

## 2018-05-02 PROBLEM — J90 PLEURAL EFFUSION ON LEFT: Status: RESOLVED | Noted: 2018-05-01 | Resolved: 2018-05-02

## 2018-05-02 LAB
ANION GAP SERPL CALC-SCNC: 8 MMOL/L (ref 0–11.9)
BASOPHILS # BLD AUTO: 0.6 % (ref 0–1.8)
BASOPHILS # BLD: 0.06 K/UL (ref 0–0.12)
BUN SERPL-MCNC: 11 MG/DL (ref 8–22)
CALCIUM SERPL-MCNC: 8.7 MG/DL (ref 8.5–10.5)
CHLORIDE SERPL-SCNC: 107 MMOL/L (ref 96–112)
CO2 SERPL-SCNC: 23 MMOL/L (ref 20–33)
CREAT SERPL-MCNC: 0.56 MG/DL (ref 0.5–1.4)
EOSINOPHIL # BLD AUTO: 0.09 K/UL (ref 0–0.51)
EOSINOPHIL NFR BLD: 1 % (ref 0–6.9)
ERYTHROCYTE [DISTWIDTH] IN BLOOD BY AUTOMATED COUNT: 46.2 FL (ref 35.9–50)
GLUCOSE SERPL-MCNC: 84 MG/DL (ref 65–99)
HCT VFR BLD AUTO: 30.2 % (ref 37–47)
HGB BLD-MCNC: 9.8 G/DL (ref 12–16)
IMM GRANULOCYTES # BLD AUTO: 0.05 K/UL (ref 0–0.11)
IMM GRANULOCYTES NFR BLD AUTO: 0.5 % (ref 0–0.9)
LYMPHOCYTES # BLD AUTO: 2.23 K/UL (ref 1–4.8)
LYMPHOCYTES NFR BLD: 23.8 % (ref 22–41)
MCH RBC QN AUTO: 31.7 PG (ref 27–33)
MCHC RBC AUTO-ENTMCNC: 32.5 G/DL (ref 33.6–35)
MCV RBC AUTO: 97.7 FL (ref 81.4–97.8)
MONOCYTES # BLD AUTO: 0.96 K/UL (ref 0–0.85)
MONOCYTES NFR BLD AUTO: 10.2 % (ref 0–13.4)
NEUTROPHILS # BLD AUTO: 5.99 K/UL (ref 2–7.15)
NEUTROPHILS NFR BLD: 63.9 % (ref 44–72)
NRBC # BLD AUTO: 0 K/UL
NRBC BLD-RTO: 0 /100 WBC
PLATELET # BLD AUTO: 577 K/UL (ref 164–446)
PMV BLD AUTO: 9 FL (ref 9–12.9)
POTASSIUM SERPL-SCNC: 4.1 MMOL/L (ref 3.6–5.5)
RBC # BLD AUTO: 3.09 M/UL (ref 4.2–5.4)
SODIUM SERPL-SCNC: 138 MMOL/L (ref 135–145)
WBC # BLD AUTO: 9.4 K/UL (ref 4.8–10.8)

## 2018-05-02 PROCEDURE — 700101 HCHG RX REV CODE 250: Mod: JW

## 2018-05-02 PROCEDURE — 501445 HCHG STAPLER, SKIN DISP: Performed by: SURGERY

## 2018-05-02 PROCEDURE — 502571 HCHG PACK, LAP CHOLE: Performed by: SURGERY

## 2018-05-02 PROCEDURE — A6223 GAUZE >16<=48 NO W/SAL W/O B: HCPCS | Performed by: SURGERY

## 2018-05-02 PROCEDURE — 0W9B30Z DRAINAGE OF LEFT PLEURAL CAVITY WITH DRAINAGE DEVICE, PERCUTANEOUS APPROACH: ICD-10-PCS | Performed by: SURGERY

## 2018-05-02 PROCEDURE — 160041 HCHG SURGERY MINUTES - EA ADDL 1 MIN LEVEL 4: Performed by: SURGERY

## 2018-05-02 PROCEDURE — 700105 HCHG RX REV CODE 258: Performed by: INTERNAL MEDICINE

## 2018-05-02 PROCEDURE — 87641 MR-STAPH DNA AMP PROBE: CPT

## 2018-05-02 PROCEDURE — 94760 N-INVAS EAR/PLS OXIMETRY 1: CPT

## 2018-05-02 PROCEDURE — 99233 SBSQ HOSP IP/OBS HIGH 50: CPT | Performed by: INTERNAL MEDICINE

## 2018-05-02 PROCEDURE — 770020 HCHG ROOM/CARE - TELE (206)

## 2018-05-02 PROCEDURE — 700111 HCHG RX REV CODE 636 W/ 250 OVERRIDE (IP)

## 2018-05-02 PROCEDURE — 501838 HCHG SUTURE GENERAL: Performed by: SURGERY

## 2018-05-02 PROCEDURE — 71046 X-RAY EXAM CHEST 2 VIEWS: CPT

## 2018-05-02 PROCEDURE — 87070 CULTURE OTHR SPECIMN AEROBIC: CPT

## 2018-05-02 PROCEDURE — 160002 HCHG RECOVERY MINUTES (STAT): Performed by: SURGERY

## 2018-05-02 PROCEDURE — 501577 HCHG TROCAR, STEP 11MM: Performed by: SURGERY

## 2018-05-02 PROCEDURE — 80048 BASIC METABOLIC PNL TOTAL CA: CPT

## 2018-05-02 PROCEDURE — 700102 HCHG RX REV CODE 250 W/ 637 OVERRIDE(OP): Performed by: STUDENT IN AN ORGANIZED HEALTH CARE EDUCATION/TRAINING PROGRAM

## 2018-05-02 PROCEDURE — 700111 HCHG RX REV CODE 636 W/ 250 OVERRIDE (IP): Performed by: INTERNAL MEDICINE

## 2018-05-02 PROCEDURE — C1729 CATH, DRAINAGE: HCPCS | Performed by: SURGERY

## 2018-05-02 PROCEDURE — 87015 SPECIMEN INFECT AGNT CONCNTJ: CPT

## 2018-05-02 PROCEDURE — 85025 COMPLETE CBC W/AUTO DIFF WBC: CPT

## 2018-05-02 PROCEDURE — 160029 HCHG SURGERY MINUTES - 1ST 30 MINS LEVEL 4: Performed by: SURGERY

## 2018-05-02 PROCEDURE — 500122 HCHG BOVIE, BLADE: Performed by: SURGERY

## 2018-05-02 PROCEDURE — 500512 HCHG ENDO PEANUT: Performed by: SURGERY

## 2018-05-02 PROCEDURE — 500385 HCHG DRAIN, PLEUROVAC ADUL: Performed by: SURGERY

## 2018-05-02 PROCEDURE — 700111 HCHG RX REV CODE 636 W/ 250 OVERRIDE (IP): Performed by: STUDENT IN AN ORGANIZED HEALTH CARE EDUCATION/TRAINING PROGRAM

## 2018-05-02 PROCEDURE — 160035 HCHG PACU - 1ST 60 MINS PHASE I: Performed by: SURGERY

## 2018-05-02 PROCEDURE — 0BCL4ZZ EXTIRPATION OF MATTER FROM LEFT LUNG, PERCUTANEOUS ENDOSCOPIC APPROACH: ICD-10-PCS | Performed by: SURGERY

## 2018-05-02 PROCEDURE — 501589 HCHG TUBE, CHEST 28FR: Performed by: SURGERY

## 2018-05-02 PROCEDURE — 160036 HCHG PACU - EA ADDL 30 MINS PHASE I: Performed by: SURGERY

## 2018-05-02 PROCEDURE — 700101 HCHG RX REV CODE 250: Performed by: INTERNAL MEDICINE

## 2018-05-02 PROCEDURE — 87205 SMEAR GRAM STAIN: CPT

## 2018-05-02 PROCEDURE — 160048 HCHG OR STATISTICAL LEVEL 1-5: Performed by: SURGERY

## 2018-05-02 PROCEDURE — 71045 X-RAY EXAM CHEST 1 VIEW: CPT

## 2018-05-02 PROCEDURE — 160009 HCHG ANES TIME/MIN: Performed by: SURGERY

## 2018-05-02 PROCEDURE — A4314 CATH W/DRAINAGE 2-WAY LATEX: HCPCS | Performed by: SURGERY

## 2018-05-02 PROCEDURE — 87075 CULTR BACTERIA EXCEPT BLOOD: CPT

## 2018-05-02 PROCEDURE — A9270 NON-COVERED ITEM OR SERVICE: HCPCS | Performed by: STUDENT IN AN ORGANIZED HEALTH CARE EDUCATION/TRAINING PROGRAM

## 2018-05-02 PROCEDURE — 36415 COLL VENOUS BLD VENIPUNCTURE: CPT

## 2018-05-02 RX ORDER — MORPHINE SULFATE 4 MG/ML
1 INJECTION, SOLUTION INTRAMUSCULAR; INTRAVENOUS ONCE
Status: COMPLETED | OUTPATIENT
Start: 2018-05-02 | End: 2018-05-02

## 2018-05-02 RX ORDER — BUPIVACAINE HYDROCHLORIDE AND EPINEPHRINE 5; 5 MG/ML; UG/ML
INJECTION, SOLUTION EPIDURAL; INTRACAUDAL; PERINEURAL
Status: DISCONTINUED | OUTPATIENT
Start: 2018-05-02 | End: 2018-05-02 | Stop reason: HOSPADM

## 2018-05-02 RX ORDER — LIDOCAINE 50 MG/G
1 PATCH TOPICAL EVERY 24 HOURS
Status: DISCONTINUED | OUTPATIENT
Start: 2018-05-02 | End: 2018-05-08 | Stop reason: HOSPADM

## 2018-05-02 RX ADMIN — PIPERACILLIN AND TAZOBACTAM 4.5 G: 4; .5 INJECTION, POWDER, LYOPHILIZED, FOR SOLUTION INTRAVENOUS; PARENTERAL at 12:59

## 2018-05-02 RX ADMIN — FENTANYL CITRATE 50 MCG: 50 INJECTION, SOLUTION INTRAMUSCULAR; INTRAVENOUS at 18:55

## 2018-05-02 RX ADMIN — FENTANYL CITRATE 50 MCG: 50 INJECTION, SOLUTION INTRAMUSCULAR; INTRAVENOUS at 19:25

## 2018-05-02 RX ADMIN — FENTANYL CITRATE 25 MCG: 50 INJECTION, SOLUTION INTRAMUSCULAR; INTRAVENOUS at 18:05

## 2018-05-02 RX ADMIN — LIDOCAINE 1 PATCH: 50 PATCH TOPICAL at 08:40

## 2018-05-02 RX ADMIN — PIPERACILLIN SODIUM AND TAZOBACTAM SODIUM 3.38 G: 3; .375 INJECTION, POWDER, FOR SOLUTION INTRAVENOUS at 02:30

## 2018-05-02 RX ADMIN — FENTANYL CITRATE 50 MCG: 50 INJECTION, SOLUTION INTRAMUSCULAR; INTRAVENOUS at 19:10

## 2018-05-02 RX ADMIN — POTASSIUM CHLORIDE AND SODIUM CHLORIDE: 450; 150 INJECTION, SOLUTION INTRAVENOUS at 20:35

## 2018-05-02 RX ADMIN — HYDROCODONE BITARTRATE AND ACETAMINOPHEN 2 TABLET: 5; 325 TABLET ORAL at 20:53

## 2018-05-02 RX ADMIN — PIPERACILLIN AND TAZOBACTAM 4.5 G: 4; .5 INJECTION, POWDER, LYOPHILIZED, FOR SOLUTION INTRAVENOUS; PARENTERAL at 20:33

## 2018-05-02 RX ADMIN — MORPHINE SULFATE 1 MG: 4 INJECTION INTRAVENOUS at 13:53

## 2018-05-02 RX ADMIN — FENTANYL CITRATE 25 MCG: 50 INJECTION, SOLUTION INTRAMUSCULAR; INTRAVENOUS at 17:50

## 2018-05-02 RX ADMIN — VANCOMYCIN HYDROCHLORIDE 700 MG: 100 INJECTION, POWDER, LYOPHILIZED, FOR SOLUTION INTRAVENOUS at 08:05

## 2018-05-02 ASSESSMENT — ENCOUNTER SYMPTOMS
EYE REDNESS: 0
DIARRHEA: 0
SPEECH CHANGE: 0
SEIZURES: 0
HEMOPTYSIS: 0
BACK PAIN: 0
CONSTIPATION: 0
FEVER: 0
HEARTBURN: 0
VOMITING: 0
SINUS PAIN: 0
FOCAL WEAKNESS: 0
DIAPHORESIS: 0
BACK PAIN: 1
TREMORS: 0
DOUBLE VISION: 0
EYE DISCHARGE: 0
MYALGIAS: 0
DIZZINESS: 0
BRUISES/BLEEDS EASILY: 0
STRIDOR: 0
LOSS OF CONSCIOUSNESS: 0
DEPRESSION: 0
TINGLING: 0
NECK PAIN: 0
DIAPHORESIS: 1
PHOTOPHOBIA: 0
PALPITATIONS: 0
CLAUDICATION: 0
WEAKNESS: 0
BLURRED VISION: 0
COUGH: 1
ABDOMINAL PAIN: 0
WEIGHT LOSS: 0
SHORTNESS OF BREATH: 1
WEAKNESS: 1
SPUTUM PRODUCTION: 0
HALLUCINATIONS: 0
NAUSEA: 0
HEADACHES: 0
POLYDIPSIA: 0
SORE THROAT: 0
WHEEZING: 0
CHILLS: 0
ORTHOPNEA: 0

## 2018-05-02 ASSESSMENT — PAIN SCALES - GENERAL
PAINLEVEL_OUTOF10: 7
PAINLEVEL_OUTOF10: 0
PAINLEVEL_OUTOF10: 8
PAINLEVEL_OUTOF10: ASSUMED PAIN PRESENT
PAINLEVEL_OUTOF10: 8
PAINLEVEL_OUTOF10: 8
PAINLEVEL_OUTOF10: 0

## 2018-05-02 ASSESSMENT — PATIENT HEALTH QUESTIONNAIRE - PHQ9
1. LITTLE INTEREST OR PLEASURE IN DOING THINGS: NOT AT ALL
2. FEELING DOWN, DEPRESSED, IRRITABLE, OR HOPELESS: NOT AT ALL
SUM OF ALL RESPONSES TO PHQ9 QUESTIONS 1 AND 2: 0

## 2018-05-02 NOTE — FLOWSHEET NOTE
Respiratory Therapy Update    Interdisciplinary Plan of Care-Goals (Indications)  Hyperinflation Protocol Indications: Atelectasis Documented by Chest X-Ray (05/02/18 0830)  Interdisciplinary Plan of Care-Outcomes   Hyperinflation Protocol Goals/Outcome: Improvement in Repeat CXR (05/01/18 1835)               Cough: Moist;Non Productive;Strong (05/02/18 0830)                        FiO2%: 21 % (05/01/18 1453)  O2 (LPM): 0 (05/02/18 0830)  O2 Daily Delivery Respiratory : Room Air with O2 Available (05/02/18 0830)    Breath Sounds  Pre/Post Intervention: Pre Intervention Assessment (05/01/18 2000)  RUL Breath Sounds: Clear (05/02/18 0830)  RML Breath Sounds: Clear;Diminished (05/02/18 0830)  RLL Breath Sounds: Diminished (05/02/18 0830)  CHINO Breath Sounds: Clear (05/02/18 0830)  LLL Breath Sounds: Diminished (05/02/18 0830)      Events/Summary/Plan: IS done with good effort. (05/02/18 0830)

## 2018-05-02 NOTE — PROGRESS NOTES
Reviewed CT scan. Persistent effusion after an episode of MRSA PNA  2 weeks ago suggest Empyema. Ct scan looks much worse in comparison to CXR but corresponds to the patient's symptoms.     Plan: Stop Doxy, change Abx to Vanc and Zosyn. Will need evaluation for Chest tube drainage and possible decortication. Keep the patient NPO from midnight, will call thoracic surgeon early in the morning. Fluid analysis for Gram stain and Cx will be obtained during the procedure. Abx to deescalate based on the Cx findings. Suspect MRSA because of history.

## 2018-05-02 NOTE — PROGRESS NOTES
Pharmacy Kinetics 22 y.o. female on vancomycin day # 2  5/2/2018    Currently Dose: Vancomycin 700 mg iv q8hr (~14 mg/kg/dose)  Received Load Dose: Yes    Indication for Treatment: empyema/pneumonia    Pertinent history per medical record: Admitted on 5/1/2018 for suspected pneumonia. Patient with recent bout of MRSA pneumonia at [facility] ([place], [place]) and completed course of linezolid. Imaging on this admission suggestive of empyema. Surgery consulted. Admitted to telemetry.    Other antibiotics: piperacillin/tazobactam 3.375 gm iv q8h    Allergies: Patient has no known allergies.     List concerns for accumulation of vancomycin: minimal concerns noted    Pertinent cultures to date:   Results     Procedure Component Value Units Date/Time    CULTURE RESP W/O GRAM STAIN [[id number]]     Order Status:  Completed Specimen:  Respirate from Sputum     URINALYSIS [[id number]]  (Abnormal) Collected:  05/01/18 1328    Order Status:  Completed Specimen:  Urine Updated:  05/01/18 1400     Color Yellow     Character Clear     Specific Gravity 1.012     Ph >=9.0 (A)     Glucose Negative mg/dL      Ketones Negative mg/dL      Protein Negative mg/dL      Bilirubin Negative     Urobilinogen, Urine 0.2     Nitrite Negative     Leukocyte Esterase Negative     Occult Blood Negative     Micro Urine Req see below     Comment: Microscopic examination not performed when specimen is clear  and chemically negative for protein, blood, leukocyte esterase  and nitrite.         Narrative:       Indication for culture:->Emergency Room Patient    URINE CULTURE(NEW) [[id number]] Collected:  05/01/18 1328    Order Status:  Completed Specimen:  Urine Updated:  05/01/18 1352    Narrative:       Indication for culture:->Emergency Room Patient    BLOOD CULTURE [[id number]] Collected:  05/01/18 1220    Order Status:  Completed Specimen:  Blood from Peripheral Updated:  05/01/18 1330    Narrative:       Per Hospital Policy: Only change Specimen Src:  "to \"Line\" if  specified by physician order.    BLOOD CULTURE [[id number]] Collected:  18 105    Order Status:  Completed Specimen:  Blood from Peripheral Updated:  18    Narrative:       Per Hospital Policy: Only change Specimen Src: to \"Line\" if  specified by physician order.        Recent Labs      18   10518   0235   WBC  10.7  9.4   NEUTSPOLYS  75.30*  63.90     Recent Labs      18   10518   0235   BUN  18  11   CREATININE  0.61  0.56   ALBUMIN  4.0   --      Blood pressure 135/96, pulse 98, temperature 36.6 °C (97.9 °F), resp. rate 18, height 1.6 m (5' 3\"), weight 47.9 kg (105 lb 9.6 oz), SpO2 100 %, not currently breastfeeding. Temp (24hrs), Av.9 °C (98.5 °F), Min:36.6 °C (97.9 °F), Max:37.4 °C (99.3 °F)    Estimated Creatinine Clearance: 118.1 mL/min (by C-G formula based on SCr of 0.56 mg/dL).    A/P   1. Vancomycin dose change: not indicated   2. Next vancomycin level: 5/3/18 @0630  3. Goal trough: 16-20 mcg/mL  4. Comments: VS stable. Afebrile. WBC stable. CrCl ~118 mL/min. Microbiology pending. Prior reported history of MRSA pneumonia noted. Minimal factors for accumulation noted. Trough in place 5/3/18 to ensure clearance. Pharmacy will follow and continue to adjust as appropriate.    Yimi Berg, PharmD  "

## 2018-05-02 NOTE — CARE PLAN
Problem: Nutritional:  Goal: Achieve adequate nutritional intake  Patient will consume >75% of meals of regular diet  Outcome: NOT MET

## 2018-05-02 NOTE — DIETARY
"Nutrition services: Day 1 of admit.  Maritza Swann is a 22 y.o. female with admitting DX of pneumonia.  Consult received for low BMI <19 (=18.28)    Visited pt at bedside, pt appears petite and thin however no visual signs of muscle/fat loss. Pt has been sick for roughly the past month and states that she has had some wt loss r/t illness. Pt reports her UBW = 110-115#. Current wt (per stand up scale) = 103#, indicating a 6% wt loss x 1 month which is severe. Pt states that she has had a general lack of appetite during time of illness, however does state that yesterday she began to feel hungry again. Currently NPO. Pt states that she is a vegetarian - will communicate to Nutrition Representative for when diet advances.     Assessment:  Height: 160 cm (5' 3\")  Weight: 47.9 kg (105 lb 9.6 oz)  Body mass index is 18.28 kg/m² (underweight)  Diet/Intake: NPO x 1    Evaluation:   1. Poor PO intake and severe wt loss of 6% in past month r/t acute illness  2. Follows a vegetarian diet  3. Reviewed past medical hx, MAR, labs, and flowsheets    Malnutrition Risk: pt likely with acute illness related malnutrition r/t pneumonia, evidenced by reduced energy intake for > 5 days and severe wt loss of 6% in ~1 month    Recommendations/Plan:  1. Advance diet to regular-vegetarian when medically feasible   2. Encourage intake of meals  3. Document intake of all meals  as % taken in ADL's to provide interdisciplinary communication across all shifts.   4. Monitor weight.  5. Nutrition rep will continue to see patient for ongoing meal and snack preferences.   6. RD to monitor for diet advancement, PO intake, wt trends, and nutrition labs/meds            "

## 2018-05-02 NOTE — CARE PLAN
Problem: Communication  Goal: The ability to communicate needs accurately and effectively will improve  Outcome: PROGRESSING AS EXPECTED  Patient communicates needs effectively by using call light       none

## 2018-05-02 NOTE — NON-PROVIDER
Internal Medicine Medical Student Note  Note Author: Hernan Shields, Student    Name Maritza Swann     1995   Age/Sex 22 y.o. female   MRN [id number]   Code Status Full code     After 5PM or if no immediate response to page, please call for cross-coverage  Attending/Team: Dr. Bhatia See Patient List for primary contact information  Call [phone] to page after hours   1st Call - Day Intern (R1):   Dr. Garber 2nd Call - Day Sr. Resident (R2/R3):   Dr. Gil         Reason for interval visit  (Principal Problem)   Empyema of left pleural space (HCC)    Interval Problem Daily Status Update  (24 hours)   CT demonstrated bilateral cavitation (Left > right) consistent with history of MRSA infection. There was also a medial left upper lobe necrosis vs. Abscess formation There is also a left pleural effusion with loculation which is consistent with empyema formation. Doxycycline is discontinued and Vancomycin and Zosyn are started. Thoracic surgery was consulted for chest tube insertion and possible decortication. Patient has been kept NPO in anticipation of possible surgical procedure.      Review of Systems   Constitutional: Positive for diaphoresis. Negative for chills, fever and malaise/fatigue.   HENT: Negative for ear discharge, ear pain and sore throat.    Eyes: Negative for blurred vision and double vision.   Respiratory: Positive for cough and shortness of breath. Negative for hemoptysis, sputum production and stridor.    Cardiovascular: Positive for chest pain. Negative for palpitations and orthopnea.   Gastrointestinal: Negative for abdominal pain, constipation and diarrhea.   Genitourinary: Negative for dysuria.   Musculoskeletal: Negative for back pain, myalgias and neck pain.   Neurological: Positive for weakness. Negative for headaches.   Psychiatric/Behavioral: Negative for depression.         Physical Exam       Vitals:    18 1835 18 2020 18 2320 18 0320    BP:  131/87 134/83 122/80   Pulse: 94 75 70 62   Resp: 18 16 16 16   Temp:  37 °C (98.6 °F) 36.7 °C (98 °F) 37.4 °C (99.3 °F)   SpO2: 92% 96% 98% 97%   Weight:  47.9 kg (105 lb 9.6 oz)     Height:         Body mass index is 18.71 kg/m². Weight: 47.9 kg (105 lb 9.6 oz)  Oxygen Therapy:  Pulse Oximetry: 97 %, O2 (LPM): 0, O2 Delivery: None (Room Air)    Physical Exam   Constitutional: She appears distressed.   HENT:   Head: Normocephalic and atraumatic.   Cardiovascular: Normal rate, regular rhythm, normal heart sounds and intact distal pulses.  Exam reveals no gallop and no friction rub.    No murmur heard.  Pulmonary/Chest: She is in respiratory distress. She has no wheezes. She has rales.   Decreased breath sounds in the left lung base.   Abdominal: Soft. Bowel sounds are normal. She exhibits no distension. There is no tenderness.   Neurological: She is alert.   Skin: Skin is warm and dry.   Psychiatric: Mood, affect and judgment normal.           Labs  CBC  - Hb = 9.8 (low) was 12.0 yesterday; consistent with hemodilution from IV fluid administration  - Hct = 30.2 (low)  - MCV = 97.7 (higher end of normal); consistent with normocytic anemia  - Plt = 577 (elevated) was 726 yesterday; most likely reactive thrombocytosis due to current infection.    CMP  Recent Labs      05/01/18   1052  05/02/18   0235   SODIUM  137  138   POTASSIUM  4.0  4.1   CHLORIDE  100  107   CO2  27  23   GLUCOSE  84  84   BUN  18  11   - Interpretation: all labs WNL.      Imaging    CT of chest with contrast  1.  Bilateral patchy opacities with cavitation, left much greater than right, are likely related to multifocal pneumonia given history of MRSA pneumonia. More confluent opacity in the medial left upper lobe contains a loculated fluid consistent with necrosis or abscess formation.    2.  Low-density left pleural effusion. Enhancing pleura suggest early empyema formation.        Assessment/Plan     #Pleural Effusion  Patient has been  having SOB for 2 days now and is worse when she is laying on her left side. This is most likely due to a pleural effusion as demonstrated by CXR in the ED and confirmed on the CT scan; however other etiologies like PE were considered especially since her D-dimer came back to 2687 (elevated). Her Wells score for PE is 1.5 which corresponds to a 1.3% risk of actually having a PE. CT did not demonstrate PE. However, this might be higher due to her elevated D-dimer and thrombocytosis.     Plan  - Surgery consulted for chest tube insertion and possible decortication.  - Begin IV Zosyn 4.5gm TID.  - IV Vancomycin 700mg in 500cc TID  - Norco 5/325 PO every 6 hours PRN  - Enoxaparin 30mg for DVT prophylaxis  - Continue to monitor O2 saturation  - If O2 saturation < 90%, provide supplemental O2 as needed.  - Monitor platelet levels        #Pneumonia  Patient presented with SOB and has a recent history of being treated for MRSA pneumonia with linezolid. She is complaining about left sided chest pain as well. Her CURB 65 score is 0 which corresponds to a 0.6% chance of mortality at 30 days. CT of the chest did demonstrate cavitation with parenchymal inflammation, consistent with her history of MRSA infection. Other etiologies like reactive airway disease have been considered as well.      Plan  - IV Zosyn and Vancomycin started  - Guaifenesin 10mg/5cc syrup [id number] PRN  - Blood cultures drawn x2, results pending.  - Urine culture pending  - CTM O2 saturation and vital signs.      #Thrombocytosis  Patient had elevated Thrombin levels at presentation. She continues to have thrombocytosis despite IV fluids over the night. She most likely has reactive thrombocytosis secondary to her recent MRSA infection and current pleural effusion. If her thrombocytosis remains elevated, she may need to be referred to hematology as an outpatient.    Plan  - Continue to monitor platelet count.

## 2018-05-02 NOTE — PROGRESS NOTES
Pt off floor going down to CT.  Consent for contrast on chart.  Monitor room updated, no signs of distress.

## 2018-05-02 NOTE — PROGRESS NOTES
Bedside report received from day shift nurse. Patient assessed and resting comfortably at this time . No complaints of pain or other needs are voiced. Call light within reach. Continuing to monitor.

## 2018-05-02 NOTE — ASSESSMENT & PLAN NOTE
- Surgery was consulted, Dr Rivera did thoracoscopy, decortication, drainage of pleural effusion with 2 chest tubes on 5/2.  - on Vanc  - There is subcutaneous emphysema.   - tissue cultures pending, so far cultures are negative  - First tube DCed in 5/5, 2nd tube DCed in 5/6  - started on Vanc and Zosyn. Zosyn DCed 5/6/2018, Vanc switched to Clindamycin 5/7/2018

## 2018-05-02 NOTE — PROGRESS NOTES
Internal Medicine Interval Note  Note Author: Ignacio Huber M.D.     Name Maritza Swann     1995   Age/Sex 22 y.o. female   MRN [id number]   Code Status Full Code      After 5PM or if no immediate response to page, please call for cross-coverage  Attending/Team: Dr. Bhatia / Otto  See Patient List for primary contact information  Call [phone] to page    1st Call - Day Intern (R1):   Dr. Huber 2nd Call - Day Sr. Resident (R2/R3):   Dr. Gil          Reason for interval visit  (Principal Problem)   Empyema of left pleural space (HCC)    Interval Problem Daily Status Update  (24 hours)   No events overnight, patient still complaining of chest pain, vital signs are stable, oxygen saturation in high 90s on room air, CTA chest showed low-density pleural effusion with pleural enhancement, possible early forming empyema. Patient was placed on Vanco and Zosyn yesterday, today is day 2, surgery consulted for possible chest tube placement/decortication. Patient is nothing by mouth since midnight, will follow-up surgery recommendations.      Review of Systems   Constitutional: Positive for malaise/fatigue. Negative for chills, diaphoresis, fever and weight loss.   HENT: Negative for congestion, ear discharge, ear pain, hearing loss, sinus pain, sore throat and tinnitus.    Eyes: Negative for blurred vision, photophobia, discharge and redness.   Respiratory: Positive for cough and shortness of breath. Negative for hemoptysis, sputum production, wheezing and stridor.    Cardiovascular: Positive for chest pain. Negative for palpitations, orthopnea, claudication and leg swelling.   Gastrointestinal: Negative for abdominal pain, constipation, heartburn, nausea and vomiting.   Genitourinary: Negative for dysuria, frequency and urgency.   Musculoskeletal: Positive for back pain. Negative for joint pain, myalgias and neck pain.   Skin: Negative for itching and rash.   Neurological: Negative  for dizziness, tingling, tremors, speech change, focal weakness, seizures, loss of consciousness and weakness.   Endo/Heme/Allergies: Negative for polydipsia. Does not bruise/bleed easily.   Psychiatric/Behavioral: Negative for depression, hallucinations and suicidal ideas.       Consultants/Specialty  Surgery    Disposition  Inpatient treatment for emphysema    Quality Measures  Quality-Core Measures   Reviewed items::  EKG reviewed, Labs reviewed, Medications reviewed and Radiology images reviewed  Haas catheter::  No Haas  DVT prophylaxis pharmacological::  Enoxaparin (Lovenox)  Ulcer Prophylaxis::  Not indicated  Antibiotics:  Treating active infection/contamination beyond 24 hours perioperative coverage          Physical Exam       Vitals:    05/01/18 2020 05/01/18 2320 05/02/18 0320 05/02/18 0710   BP: 131/87 134/83 122/80 135/96   Pulse: 75 70 62 98   Resp: 16 16 16 18   Temp: 37 °C (98.6 °F) 36.7 °C (98 °F) 37.4 °C (99.3 °F) 36.6 °C (97.9 °F)   SpO2: 96% 98% 97% 100%   Weight: 47.9 kg (105 lb 9.6 oz)      Height:         Body mass index is 18.71 kg/m². Weight: 47.9 kg (105 lb 9.6 oz)  Oxygen Therapy:  Pulse Oximetry: 100 %, O2 (LPM): 0, FiO2%: 21 %, O2 Delivery: None (Room Air)    Physical Exam   Constitutional: She is oriented to person, place, and time and well-developed, well-nourished, and in no distress. No distress.   HENT:   Head: Normocephalic and atraumatic.   Eyes: Pupils are equal, round, and reactive to light. No scleral icterus.   Neck: Normal range of motion. No thyromegaly present.   Cardiovascular: Normal rate and regular rhythm.  Exam reveals no gallop and no friction rub.    No murmur heard.  Pulmonary/Chest: Effort normal. No respiratory distress. She has decreased breath sounds in the left lower field. She has no wheezes. She exhibits tenderness.   Abdominal: She exhibits no distension. There is no tenderness. There is no rebound.   Musculoskeletal: Normal range of motion. She exhibits  tenderness (back). She exhibits no edema.   Lymphadenopathy:     She has no cervical adenopathy.   Neurological: She is alert and oriented to person, place, and time. She has normal strength. No cranial nerve deficit.   Skin: No rash noted. She is not diaphoretic. No erythema. No pallor.   Psychiatric: Mood, affect and judgment normal.         Lab Data Review:         5/2/2018  1:08 PM    Recent Labs      05/01/18   1052  05/02/18   0235   SODIUM  137  138   POTASSIUM  4.0  4.1   CHLORIDE  100  107   CO2  27  23   BUN  18  11   CREATININE  0.61  0.56   CALCIUM  9.8  8.7       Recent Labs      05/01/18   1052  05/02/18   0235   ALTSGPT  33   --    ASTSGOT  18   --    ALKPHOSPHAT  71   --    TBILIRUBIN  0.7   --    GLUCOSE  84  84       Recent Labs      05/01/18 1052  05/02/18   0235   RBC  3.77*  3.09*   HEMOGLOBIN  12.0  9.8*   HEMATOCRIT  36.0*  30.2*   PLATELETCT  726*  577*       Recent Labs      05/01/18 1052  05/02/18   0235   WBC  10.7  9.4   NEUTSPOLYS  75.30*  63.90   LYMPHOCYTES  16.70*  23.80   MONOCYTES  6.90  10.20   EOSINOPHILS  0.10  1.00   BASOPHILS  0.60  0.60   ASTSGOT  18   --    ALTSGPT  33   --    ALKPHOSPHAT  71   --    TBILIRUBIN  0.7   --            Assessment/Plan     * Empyema of left pleural space (HCC)- (present on admission)   Assessment & Plan    - Shown by portable x-ray  - Most likely due to MRSA pneumonia  - two-views chest x-ray showed increasing left lower lobe opacity with a trace of left pleural fluid which is consistent with pneumonia.  - CTA showed Low-density left pleural effusion. Enhancing pleura suggest early empyema formation  - Consulted surgery for possible Chest tube placement   - on Vanc and zosyn D2           PNA (pneumonia)- (present on admission)   Assessment & Plan    - Hx: 31 years old female, diagnosed with staph pneumonia post flu, treated with linezolid,completedtreatmenthistorythe presented with 2 days history of increasing left-sided chest pain and  shortness of breath.  - Physical exam: Tachypnea, Tenderness over the left lateral chest wall and back, decreased air entry on the left side.  - Labs: Lactic acid 1.7, WBC count 10.7, neutrophils 75.3%  - Imaging: Chest x-ray showed left lung base atelectasis and possible small pleural effusion  - Possibly pneumonia versus pleuritis post infection with musculoskeletal pain due to cough  - CURB 65 score 0, 0.6% 30 days mortality  - D-dimer elevated   - Order chest x-ray 2 views showed increasing left lower lobe opacity with a trace of left pleural fluid which is consistent with pneumonia.  - CTA chest Bilateral patchy opacities with cavitation, left much greater than right, are likely related to multifocal pneumonia given history of MRSA pneumonia. More confluent opacity in the medial left upper lobe contains a loculated fluid consistent with necrosis or abscess formation. Low-density left pleural effusion. Enhancing pleura suggest early empyema formation.   Plan:  - Blood cultures X2 pending  - Urine culture pending  -  Start Vanc and Zosyn D2 to cover MRSA   - Monitor vital signs  - Consulted surgery for possible CT placement         Shortness of breath- (present on admission)   Assessment & Plan    - Unknown cause possibly pneumonia versus pleural effusion.   - Vital signs normal except for tachypnea  - PERC score 0 pulmonary embolism can be ruled out, will order d-dimer        Thrombocytosis (HCC)- (present on admission)   Assessment & Plan    - Platelet count 726  - Possibly due to dehydration or reactive  - Monitor platelet count

## 2018-05-02 NOTE — PROGRESS NOTES
"Pharmacy Kinetics 22 y.o. female on vancomycin day # 1 2018    Currently on Vancomycin 1200 mg iv loading dose followed by Vancomycin 700 mg [id number]    Indication for Treatment: Empyema    Pertinent history per medical record: Admitted on 2018 for pneumonia.  Patient has a history of MRSA pneumonia and recently completed a 9 day course of Zyvox.  CT scan shows persistent pleural effusion and is suggestive of empyema.  Surgery consulted for drainage and empiric antibiotics started.      Other antibiotics: Zosyn    Allergies: Patient has no known allergies.     List concerns for renal function: None    Pertinent cultures to date:     Blood culture x2 -- in process    Resp culture -- ordered     Recent Labs      18   1052   WBC  10.7   NEUTSPOLYS  75.30*     Recent Labs      18   1052   BUN  18   CREATININE  0.61   ALBUMIN  4.0     No results for input(s): VANCOTROUGH, VANCOPEAK, VANCORANDOM in the last 72 hours.No intake or output data in the 24 hours ending 18 0210   Blood pressure 134/83, pulse 70, temperature 36.7 °C (98 °F), resp. rate 16, height 1.6 m (5' 3\"), weight 47.9 kg (105 lb 9.6 oz), SpO2 98 %, not currently breastfeeding. Temp (24hrs), Av.9 °C (98.5 °F), Min:36.7 °C (98 °F), Max:37.3 °C (99.2 °F)      A/P   1. Vancomycin dose change: New start  2. Next vancomycin level: Prior to 4th dose (not ordered)  3. Goal trough: 16-20 mcg/mL  4. Comments: Patient has little risk for accumulation.  Will start vancomycin per protocol.  Recommend deescalation of therapy pending culture results.  Pharmacy will continue to follow.      John Hernandez, PharmD      "

## 2018-05-02 NOTE — NON-PROVIDER
Internal Medicine Medical Student Admitting History and Physical  Note Author: Hernan Shields, Student    Name Maritza Swann     1995   Age/Sex 22 y.o. female   MRN [id number]   Code Status Full code     After 5PM or if no immediate response to page, please call for cross-coverage  Attending/Team: Dr. Bhatia See Patient List for primary contact information  Call [phone] to page after hours   1st Call - Day Intern (R1):   Dr. Resendiz 2nd Call - Day Sr. Resident (R2/R3):   Dr. Gil       Chief Complaint:  Shortness of Breath    HPI:  Maritza Sherwood is a 22F with a PMH of MRSA pneumonia that required 9-day regiment of linezolid, depression, endometriosis, and ovarian cysts who presents to the ED at the request of her primary care provider for SOB for the past few days. She was admitted to [facility] on  and was diagnosed with a superimposed MRSA infection after she was found to be influenza (+). She was hospitalized for a week and was given a 9 day regimen of linezolid which she completed yesterday. She noticed her SOB 2 days ago and reports that it is worse with inspiration and when she laying on her left side. She took tylenol and ibuprofen for her pain with minimal alleviation. She went to her PCP where she was found to be tachycardic at 113 and was requested to go to the ED. Patient denies any sick contacts at home or at work. Also denies fever, N/V, and hemoptysis.    At the ED patient was still tachycardic and SOB. She was given Ceftriaxone 1g injection and a NS bolus for her tachycardia.    Review of Systems   Constitutional: Positive for malaise/fatigue. Negative for chills, diaphoresis and fever.   HENT: Negative for congestion and sore throat.    Eyes: Negative for blurred vision and double vision.   Respiratory: Positive for cough, sputum production, shortness of breath and wheezing. Negative for hemoptysis.    Cardiovascular: Negative for chest pain and palpitations.    Gastrointestinal: Positive for abdominal pain. Negative for constipation, diarrhea, nausea and vomiting.   Genitourinary: Negative for dysuria and urgency.   Musculoskeletal: Positive for back pain. Negative for myalgias.   Neurological: Negative for dizziness, sensory change and headaches.   Psychiatric/Behavioral: Negative for depression.             Past Medical History  And current medications (link outpatient medications  with diagnosis)    Past Medical History:   Diagnosis Date   • Asthma     as a child   • Depression    • Endometriosis 2014   • Migraine    • Other specified symptom associated with female genital organs     endometriosis, ovarian cysts   • Ovarian cyst 2014     Patient does not take other medications. Completed 9-day regimen of Linezolid 1 day ago.      Past Surgical History:  Past Surgical History:   Procedure Laterality Date   • MAMMOPLASTY AUGMENTATION Bilateral 5/27/2015    Procedure: MAMMOPLASTY AUGMENTATION-SALINE;  Surgeon: Prince Lyn M.D.;  Location: SURGERY [facility];  Service:    • LAPAROSCOPY  7/20/2014   • DENTAL EXTRACTION(S)  2008    wisdom teeth         Medication Allergy/Sensitivities:  No Known Allergies      Family History:  Family History   Problem Relation Age of Onset   • Hypertension Father    • Bipolar disorder Sister    • Other Sister      mental disorder.   • Asthma Brother    • Arthritis Maternal Grandmother    • Diabetes Maternal Grandmother    • Bipolar disorder Maternal Grandfather    • Other Maternal Grandfather      mental disorder.   • Diabetes     • Hypertension           Social History:  Smoking: denies  Alcohol: drinks 1-2x per week  [place]ict[place]: Smokes marijuana; last time was 2 weeks ago.  Other: Works at a coffee shop.  Living situation: Lives with baby daughter and roommate.      Physical Exam     Vitals:    05/01/18 1430 05/01/18 1453 05/01/18 1510 05/01/18 1524   BP:   132/87    Pulse: 84 97 100    Resp: 20 17 18    Temp:   37.3 °C (99.2  "°F)    SpO2: 98% 99% 93%    Weight:    48.6 kg (107 lb 2.3 oz)   Height:    1.6 m (5' 3\")     Body mass index is 18.98 kg/m².  /87   Pulse 100   Temp 37.3 °C (99.2 °F)   Resp 18   Ht 1.6 m (5' 3\")   Wt 48.6 kg (107 lb 2.3 oz)   SpO2 93%   Breastfeeding? No   BMI 18.98 kg/m²   O2 therapy: Pulse Oximetry: 93 %, O2 (LPM): 0, FiO2%: 21 %, O2 Delivery: None (Room Air)    Physical Exam   Constitutional: She appears distressed.   HENT:   Head: Normocephalic and atraumatic.   Cardiovascular: Normal rate, regular rhythm, normal heart sounds and intact distal pulses.  Exam reveals no gallop and no friction rub.    No murmur heard.  Pulmonary/Chest: She is in respiratory distress. She has wheezes. She has rales.   Decreased breath sounds heard at the left lung base and middle left lung. Dullness to percussion seen at the left lung base.   Abdominal: Soft. Bowel sounds are normal. She exhibits no distension. There is no tenderness.   Musculoskeletal: She exhibits no edema.   Neurological: She is alert.   Skin: Skin is warm and dry. She is not diaphoretic.   Psychiatric: Mood, affect and judgment normal.     Labs  CBC  - WBC = 10.7/neutrophils = 75.3%; upper end of normal WBC with increased neutrophils consistent with infectious/inflammatory process.  - Hb = 12.0 (normal)  - Hct = 36.0 (low)  - Platelets = 726 (elevated)    CMP  - Lactic Acid = 1.7 (normal)  - Total Protein = 8.4 (low)  - Globulin = 4.4 (elevated)    Coagulation  - D-dimer = 2687 (elevated); must rule out PE.    Imaging  Portable CXR  - LEFT lung base atelectasis versus developing pneumonia with possible small associated pleural effusion.    STAT CT w/contrast: results pending    Assessment/Plan     #Shortness of breath  Patient has been having SOB for 2 days now and is worse when she is laying on her left side. This is most likely due to a pleural effusion as demonstrated by CXR in the ED; however other etiologies like PE must be considered " especially since her D-dimer came back to 2687 (elevated). Her Wells score for PE is 1.5 which corresponds to a 1.3% risk of actually having a PE. However, this might be higher due to her elevated D-dimer and thrombocytosis    Plan  - STAT CT PE to rule out PE.  - Order 2 view CXR for pleural effusion  - Enoxaparin 30mg for DVT prophylaxis  - continue to monitor O2 saturation  - If O2 saturation < 90%, provide supplemental O2 as needed.  - Monitor platelet levels      #Pneumonia  Patient presented with SOB and has a recent history of being treated for MRSA pneumonia with linezolid. She is complaining about left sided chest pain as well. Her CUB 65 score is 0 which corresponds to a 0.6% chance of mortality at 30 days. Other etiologies like reactive airway disease have been considered as well.     Plan  - Begin Doxycycline 100m PO daily for atypical pneumonia coverage and MRSA coverage.  - Blood cultures drawn x2, results pending.  - Urine culture pending  - Obtain 2 view of the chest.  - CTM O2 saturation and vital signs.

## 2018-05-02 NOTE — PROGRESS NOTES
Received handoff report. Assumed care of pt. Pt sitting up in bed. Patient not in distress, AAOX 4. Initial assessment conducted. Safety precautions in place. Call light within reach. Will continue to conduct hourly rounds. Educated to call for assistance.

## 2018-05-02 NOTE — CARE PLAN
Problem: Safety  Goal: Will remain free from injury  Outcome: PROGRESSING AS EXPECTED  Pt steady when ambulating, fall precautions in place. Pt educated to call for assistance, calls appropriately. Call light within reach, hourly rounding in practice.    Problem: Knowledge Deficit  Goal: Knowledge of disease process/condition, treatment plan, diagnostic tests, and medications will improve  Educated pt regarding plan of care. Encouraged pt and family at bedside to ask questions and communicate any needs/concerns.

## 2018-05-03 ENCOUNTER — APPOINTMENT (OUTPATIENT)
Dept: RADIOLOGY | Facility: MEDICAL CENTER | Age: 23
DRG: 163 | End: 2018-05-03
Attending: NURSE PRACTITIONER
Payer: MEDICAID

## 2018-05-03 LAB
ANION GAP SERPL CALC-SCNC: 10 MMOL/L (ref 0–11.9)
BACTERIA UR CULT: NORMAL
BASOPHILS # BLD AUTO: 0.6 % (ref 0–1.8)
BASOPHILS # BLD: 0.07 K/UL (ref 0–0.12)
BUN SERPL-MCNC: 6 MG/DL (ref 8–22)
CALCIUM SERPL-MCNC: 8.5 MG/DL (ref 8.5–10.5)
CHLORIDE SERPL-SCNC: 102 MMOL/L (ref 96–112)
CO2 SERPL-SCNC: 26 MMOL/L (ref 20–33)
CREAT SERPL-MCNC: 0.56 MG/DL (ref 0.5–1.4)
EOSINOPHIL # BLD AUTO: 0.12 K/UL (ref 0–0.51)
EOSINOPHIL NFR BLD: 1 % (ref 0–6.9)
ERYTHROCYTE [DISTWIDTH] IN BLOOD BY AUTOMATED COUNT: 45.7 FL (ref 35.9–50)
GLUCOSE SERPL-MCNC: 94 MG/DL (ref 65–99)
GRAM STN SPEC: NORMAL
HCT VFR BLD AUTO: 29.3 % (ref 37–47)
HGB BLD-MCNC: 9.3 G/DL (ref 12–16)
IMM GRANULOCYTES # BLD AUTO: 0.05 K/UL (ref 0–0.11)
IMM GRANULOCYTES NFR BLD AUTO: 0.4 % (ref 0–0.9)
LYMPHOCYTES # BLD AUTO: 1.15 K/UL (ref 1–4.8)
LYMPHOCYTES NFR BLD: 10 % (ref 22–41)
MCH RBC QN AUTO: 31.1 PG (ref 27–33)
MCHC RBC AUTO-ENTMCNC: 31.7 G/DL (ref 33.6–35)
MCV RBC AUTO: 98 FL (ref 81.4–97.8)
MONOCYTES # BLD AUTO: 1.37 K/UL (ref 0–0.85)
MONOCYTES NFR BLD AUTO: 11.9 % (ref 0–13.4)
NEUTROPHILS # BLD AUTO: 8.78 K/UL (ref 2–7.15)
NEUTROPHILS NFR BLD: 76.1 % (ref 44–72)
NRBC # BLD AUTO: 0 K/UL
NRBC BLD-RTO: 0 /100 WBC
PLATELET # BLD AUTO: 526 K/UL (ref 164–446)
PMV BLD AUTO: 9 FL (ref 9–12.9)
POTASSIUM SERPL-SCNC: 3.7 MMOL/L (ref 3.6–5.5)
RBC # BLD AUTO: 2.99 M/UL (ref 4.2–5.4)
SIGNIFICANT IND 70042: NORMAL
SIGNIFICANT IND 70042: NORMAL
SITE SITE: NORMAL
SITE SITE: NORMAL
SODIUM SERPL-SCNC: 138 MMOL/L (ref 135–145)
SOURCE SOURCE: NORMAL
SOURCE SOURCE: NORMAL
VANCOMYCIN TROUGH SERPL-MCNC: 10.6 UG/ML (ref 10–20)
WBC # BLD AUTO: 11.5 K/UL (ref 4.8–10.8)

## 2018-05-03 PROCEDURE — 700111 HCHG RX REV CODE 636 W/ 250 OVERRIDE (IP): Performed by: INTERNAL MEDICINE

## 2018-05-03 PROCEDURE — 700101 HCHG RX REV CODE 250: Performed by: INTERNAL MEDICINE

## 2018-05-03 PROCEDURE — 99233 SBSQ HOSP IP/OBS HIGH 50: CPT | Performed by: INTERNAL MEDICINE

## 2018-05-03 PROCEDURE — 700102 HCHG RX REV CODE 250 W/ 637 OVERRIDE(OP): Performed by: NURSE PRACTITIONER

## 2018-05-03 PROCEDURE — 700105 HCHG RX REV CODE 258: Performed by: INTERNAL MEDICINE

## 2018-05-03 PROCEDURE — A9270 NON-COVERED ITEM OR SERVICE: HCPCS | Performed by: STUDENT IN AN ORGANIZED HEALTH CARE EDUCATION/TRAINING PROGRAM

## 2018-05-03 PROCEDURE — 80202 ASSAY OF VANCOMYCIN: CPT

## 2018-05-03 PROCEDURE — 85025 COMPLETE CBC W/AUTO DIFF WBC: CPT

## 2018-05-03 PROCEDURE — 770006 HCHG ROOM/CARE - MED/SURG/GYN SEMI*

## 2018-05-03 PROCEDURE — A9270 NON-COVERED ITEM OR SERVICE: HCPCS | Performed by: NURSE PRACTITIONER

## 2018-05-03 PROCEDURE — 71045 X-RAY EXAM CHEST 1 VIEW: CPT

## 2018-05-03 PROCEDURE — 94760 N-INVAS EAR/PLS OXIMETRY 1: CPT

## 2018-05-03 PROCEDURE — 36415 COLL VENOUS BLD VENIPUNCTURE: CPT

## 2018-05-03 PROCEDURE — 80048 BASIC METABOLIC PNL TOTAL CA: CPT

## 2018-05-03 PROCEDURE — 700111 HCHG RX REV CODE 636 W/ 250 OVERRIDE (IP): Performed by: STUDENT IN AN ORGANIZED HEALTH CARE EDUCATION/TRAINING PROGRAM

## 2018-05-03 PROCEDURE — 700102 HCHG RX REV CODE 250 W/ 637 OVERRIDE(OP): Performed by: STUDENT IN AN ORGANIZED HEALTH CARE EDUCATION/TRAINING PROGRAM

## 2018-05-03 RX ORDER — IBUPROFEN 600 MG/1
600 TABLET ORAL
Status: DISCONTINUED | OUTPATIENT
Start: 2018-05-03 | End: 2018-05-08 | Stop reason: HOSPADM

## 2018-05-03 RX ORDER — OXYCODONE HYDROCHLORIDE 5 MG/1
5 TABLET ORAL EVERY 4 HOURS PRN
Status: DISCONTINUED | OUTPATIENT
Start: 2018-05-03 | End: 2018-05-08 | Stop reason: HOSPADM

## 2018-05-03 RX ORDER — ACETAMINOPHEN 325 MG/1
650 TABLET ORAL EVERY 6 HOURS
Status: DISCONTINUED | OUTPATIENT
Start: 2018-05-03 | End: 2018-05-08 | Stop reason: HOSPADM

## 2018-05-03 RX ORDER — MORPHINE SULFATE 4 MG/ML
1 INJECTION, SOLUTION INTRAMUSCULAR; INTRAVENOUS EVERY 4 HOURS PRN
Status: DISCONTINUED | OUTPATIENT
Start: 2018-05-03 | End: 2018-05-05

## 2018-05-03 RX ORDER — OXYCODONE HYDROCHLORIDE 10 MG/1
10 TABLET ORAL EVERY 4 HOURS PRN
Status: DISCONTINUED | OUTPATIENT
Start: 2018-05-03 | End: 2018-05-08 | Stop reason: HOSPADM

## 2018-05-03 RX ADMIN — MORPHINE SULFATE 1 MG: 4 INJECTION INTRAVENOUS at 09:56

## 2018-05-03 RX ADMIN — HYDROCODONE BITARTRATE AND ACETAMINOPHEN 2 TABLET: 5; 325 TABLET ORAL at 04:38

## 2018-05-03 RX ADMIN — HYDROCODONE BITARTRATE AND ACETAMINOPHEN 2 TABLET: 5; 325 TABLET ORAL at 10:45

## 2018-05-03 RX ADMIN — IBUPROFEN 600 MG: 600 TABLET, FILM COATED ORAL at 17:41

## 2018-05-03 RX ADMIN — OXYCODONE HYDROCHLORIDE 10 MG: 10 TABLET ORAL at 21:16

## 2018-05-03 RX ADMIN — PIPERACILLIN AND TAZOBACTAM 4.5 G: 4; .5 INJECTION, POWDER, LYOPHILIZED, FOR SOLUTION INTRAVENOUS; PARENTERAL at 04:39

## 2018-05-03 RX ADMIN — VANCOMYCIN HYDROCHLORIDE 1000 MG: 100 INJECTION, POWDER, LYOPHILIZED, FOR SOLUTION INTRAVENOUS at 09:37

## 2018-05-03 RX ADMIN — ACETAMINOPHEN 650 MG: 325 TABLET, FILM COATED ORAL at 13:03

## 2018-05-03 RX ADMIN — PIPERACILLIN AND TAZOBACTAM 4.5 G: 4; .5 INJECTION, POWDER, LYOPHILIZED, FOR SOLUTION INTRAVENOUS; PARENTERAL at 13:04

## 2018-05-03 RX ADMIN — LIDOCAINE 1 PATCH: 50 PATCH TOPICAL at 08:45

## 2018-05-03 RX ADMIN — ACETAMINOPHEN 650 MG: 325 TABLET, FILM COATED ORAL at 17:41

## 2018-05-03 RX ADMIN — OXYCODONE HYDROCHLORIDE 10 MG: 10 TABLET ORAL at 13:47

## 2018-05-03 RX ADMIN — PIPERACILLIN AND TAZOBACTAM 4.5 G: 4; .5 INJECTION, POWDER, LYOPHILIZED, FOR SOLUTION INTRAVENOUS; PARENTERAL at 21:17

## 2018-05-03 RX ADMIN — VANCOMYCIN HYDROCHLORIDE 700 MG: 100 INJECTION, POWDER, LYOPHILIZED, FOR SOLUTION INTRAVENOUS at 00:16

## 2018-05-03 RX ADMIN — VANCOMYCIN HYDROCHLORIDE 1000 MG: 100 INJECTION, POWDER, LYOPHILIZED, FOR SOLUTION INTRAVENOUS at 17:41

## 2018-05-03 RX ADMIN — PROCHLORPERAZINE EDISYLATE 10 MG: 5 INJECTION INTRAMUSCULAR; INTRAVENOUS at 17:05

## 2018-05-03 ASSESSMENT — ENCOUNTER SYMPTOMS
PALPITATIONS: 0
DIARRHEA: 0
HEMOPTYSIS: 0
HEADACHES: 0
SHORTNESS OF BREATH: 1
BACK PAIN: 1
CONSTIPATION: 0
ORTHOPNEA: 0
MYALGIAS: 1
FOCAL WEAKNESS: 0
SORE THROAT: 0
CHILLS: 0
NAUSEA: 0
SPUTUM PRODUCTION: 0
SHORTNESS OF BREATH: 0
SENSORY CHANGE: 0
DIZZINESS: 0
COUGH: 1
COUGH: 0
FEVER: 0
SPEECH CHANGE: 0
ABDOMINAL PAIN: 0
BLURRED VISION: 0
VOMITING: 0
PND: 0
DOUBLE VISION: 0
DEPRESSION: 0

## 2018-05-03 ASSESSMENT — PAIN SCALES - GENERAL
PAINLEVEL_OUTOF10: 0
PAINLEVEL_OUTOF10: 5
PAINLEVEL_OUTOF10: 4
PAINLEVEL_OUTOF10: 7
PAINLEVEL_OUTOF10: 8
PAINLEVEL_OUTOF10: 6
PAINLEVEL_OUTOF10: 6
PAINLEVEL_OUTOF10: 0
PAINLEVEL_OUTOF10: 0

## 2018-05-03 ASSESSMENT — PATIENT HEALTH QUESTIONNAIRE - PHQ9
SUM OF ALL RESPONSES TO PHQ9 QUESTIONS 1 AND 2: 0
1. LITTLE INTEREST OR PLEASURE IN DOING THINGS: NOT AT ALL
2. FEELING DOWN, DEPRESSED, IRRITABLE, OR HOPELESS: NOT AT ALL

## 2018-05-03 NOTE — PROGRESS NOTES
"  Trauma/Surgical Progress Note    Author: Diana Carrasco Date & Time created: 5/3/2018   1:22 PM     Interval Events:  POD #1 Thoracoscopy with decortication  Pain control inadequate    - Pain medications adjusted  - Continue chest tubes to suction  - Remove Haas  - Mobilize  - Transfer to [facility]    Review of Systems   Constitutional: Negative for fever.   Respiratory: Positive for shortness of breath. Negative for cough.    Cardiovascular: Negative for palpitations.   Gastrointestinal: Negative for nausea and vomiting.   Musculoskeletal: Positive for myalgias (left chest wall pain).   Neurological: Negative for dizziness and headaches.     Hemodynamics:  Blood pressure 126/75, pulse (!) 101, temperature 37.6 °C (99.7 °F), resp. rate 16, height 1.6 m (5' 3\"), weight 61.4 kg (135 lb 5.8 oz), SpO2 98 %, not currently breastfeeding.     Respiratory:    Respiration: 16, Pulse Oximetry: 98 %, O2 Daily Delivery Respiratory : Silicone Nasal Cannula  Chest Tube Group 1 (A) Left 28-Tube Status / Drainage: Patent;Moderate, Chest Tube Group 2 (B) Left 32-Tube Status / Drainage: Draining;Patent, Chest Tube Group 1 (A) Left 28-Device: Suction 20 cm Water, Chest Tube Group 2 (B) Left 32-Device: Suction 20 cm Water  Work Of Breathing / Effort: Mild  RUL Breath Sounds: Clear, RML Breath Sounds: Diminished, RLL Breath Sounds: Diminished, CHINO Breath Sounds: Clear, LLL Breath Sounds: Diminished  Fluids:    Intake/Output Summary (Last 24 hours) at 05/03/18 1322  Last data filed at 05/03/18 1300   Gross per 24 hour   Intake              925 ml   Output             2711 ml   Net            -1786 ml     Admit Weight: 46.8 kg (103 lb 2.8 oz)  Current Weight: 61.4 kg (135 lb 5.8 oz)    Physical Exam   Constitutional: She is oriented to person, place, and time. She appears well-developed.   HENT:   Head: Normocephalic.   Neck: No JVD present. No tracheal deviation present.   Cardiovascular: Normal rate.    Pulmonary/Chest: Effort normal. No " respiratory distress.   Left chest tubes x 2 to suction, serosanguineous  drainage   Abdominal: Soft. She exhibits no distension. There is no tenderness.   Genitourinary:   Genitourinary Comments: Haas to gravity   Musculoskeletal:   Moves all extremities   Neurological: She is alert and oriented to person, place, and time.   Skin: Skin is warm and dry.   Nursing note and vitals reviewed.      Medical Decision Making/Problem List:    Active Hospital Problems    Diagnosis   • PNA (pneumonia) [J18.9]     Priority: High   • Empyema of left pleural space (HCC) [J86.9]     Priority: High     5/2 Thoracoscopy with decortication  2 chest tubes placed in OR  5/3 [facility] #1 190ml total output  [facility] #2 30ml total output  Yimi Rivera MD.  General surgery.     • Thrombocytosis (HCC) [D47.3]   • Shortness of breath [R06.02]     Core Measures & Quality Metrics:  Labs reviewed, Medications reviewed and Radiology images reviewed  Haas catheter: One or Two Days Post Surgery (Day of Surgery being Day 0) (Trial Haas removal)      DVT Prophylaxis: Enoxaparin (Lovenox)            SHENG Score  Discussed patient condition with Family, RN, Patient and general surgery, Dr. Rivera.

## 2018-05-03 NOTE — OR SURGEON
Immediate Post OP Note    PreOp Diagnosis: left empyema  PostOp Diagnosis: same    Procedure(s):  THORACOSCOPY WITH DECORT - Wound Class: Dirty  placement chest tubes x2  Surgeon(s):  NATY Mayorga M.D.    Anesthesiologist/Type of Anesthesia:  Anesthesiologist: Jaylen Ferrer M.D./General    Surgical Staff:  Circulator: Agatha Urrutia R.N.  Scrub Person: Mehrdad Beckman    Specimens removed if any:  * No specimens in log *  Empyema  Tissue  For culture and path  Estimated Blood Loss: 50    Findings: empyema left lung    Complications: none        5/2/2018 5:36 PM Yimi Rivera M.D.

## 2018-05-03 NOTE — PROGRESS NOTES
Bedside report received from night nurse. Assumed care of pt. Pt awake, laying in bed. A/Ox4, VSS. No complaints at this time. POC reviewed and white board updated. Tele box on. Call light in reach. Bed locked in lowest position with 2 upper bed rails up.

## 2018-05-03 NOTE — PROGRESS NOTES
Patient assessed and resting comfortably with no complaints of pain or other needs voiced. Chest tube patent and no complications assessed. Call light within reach. Will continue to monitor.

## 2018-05-03 NOTE — NON-PROVIDER
Internal Medicine Medical Student Note  Note Author: Hernan Shields, Student    Name Maritza Swann     1995   Age/Sex 22 y.o. female   MRN [id number]   Code Status Full Code     After 5PM or if no immediate response to page, please call for cross-coverage  Attending/Team: Dr. Bhatia See Patient List for primary contact information  Call [phone] to page after hours   1st Call - Day Intern (R1):   Dr. Garber 2nd Call - Day Sr. Resident (R2/R3):   Dr. Gil         Reason for interval visit  (Principal Problem)   Empyema of left pleural space (HCC)    Interval Problem Daily Status Update  (24 hours)   Patient went in for surgery at around 4:45PM yesterday for thoracoscopy with decortication. The fluid was sent to culture and pathology. No apparent complications. Minor leak from chest tube was seen and ICU nurse was notified, there were no concerning findings. Patient requested that her ibanez catheter remain in place, MD was notified and put the order in. Overnight, nursing staff noticed that there was some crepitus that was moving up into the shoulder and MD was notified. Surgery was consulted and said that crepitus is normal in chest tube insertion and should subside in a few days.    Patient is in pain. She prefers not to move due to the position of the chest tube. She is able to breathe and talk. The superior chest tube is showing minor tidal movements with 30cc of blood. The inferior chest tube has produced 180cc of blood and continues to produce gas. Patient no fever, chills or SOB. Patient has requested that her Ibanez catheter to remain in place as she does not want to ambulate due to the pain.    Review of Systems   Constitutional: Negative for chills and fever.   HENT: Negative for congestion and sore throat.    Eyes: Negative for blurred vision and double vision.   Respiratory: Negative for cough, hemoptysis and shortness of breath.    Cardiovascular: Positive for chest pain.  Negative for palpitations and orthopnea.   Gastrointestinal: Negative for abdominal pain.   Genitourinary: Negative for dysuria.   Musculoskeletal: Positive for back pain.   Neurological: Negative for headaches.       Physical Exam       Vitals:    05/02/18 2345 05/03/18 0045 05/03/18 0145 05/03/18 0300   BP: 124/75 139/77 131/73 125/86   Pulse: 89 84 67 85   Resp: 17   18   Temp: 37.1 °C (98.8 °F)   36.7 °C (98 °F)   SpO2: 99% 100% 100% 99%   Weight:       Height:         Body mass index is 23.98 kg/m². Weight: 61.4 kg (135 lb 5.8 oz)  Oxygen Therapy:  Pulse Oximetry: 99 %, O2 (LPM): 0, O2 Delivery: None (Room Air)    Physical Exam   Constitutional: She is well-developed, well-nourished, and in no distress.   Patient is mildly uncomfortable and is laying in the right lateral decubitus position.   HENT:   Head: Normocephalic and atraumatic.   Cardiovascular: Normal rate, regular rhythm, normal heart sounds and intact distal pulses.  Exam reveals no gallop and no friction rub.    No murmur heard.  Pulmonary/Chest: Effort normal. No respiratory distress.   Crepitus on palpation with 10-12 cm around the chest tube insertion site. Difficult to auscultate due to patient position and crepitus.   Abdominal: Soft. Bowel sounds are normal. She exhibits no distension. There is no tenderness.   Skin: Skin is warm and dry. She is not diaphoretic. No erythema.       Labs  CBC  - WBC = 11.5 elevated; consistent with inflammatory process  - Hb = 9.3  - Hct = 29.3; low  - Plt = 526; elevated    CMP  - Electrolytes WNL    Imaging  CXR  1.  Interval placement of 2 left chest tubes.  2.  Bilateral infiltrates, left greater than right.  Interpretation: proper placement of chest tube with appropriate drainage.    Assessment/Plan     #Empyema  Patient has been having SOB for 2 days now and is worse when she is laying on her left side. Although she is able to speak in complete sentences, she takes deep breaths and looks mildly  uncomfortable. This is most likely due to an empyema as demonstrated by CXR in the ED and confirmed on the CT scan; however other etiologies like PE were considered especially since her D-dimer came back to 2687 (elevated). Her Wells score for PE is 1.5 which corresponds to a 1.3% risk of actually having a PE. CT did not demonstrate PE but demonstrated the appearance of loculated fluid. Patient was sent to surgery for thorascopy with decortication, tissue cultures were sent to the lab and results are pending.     Plan  - Begin IV Zosyn 4.5gm TID.  - IV Vancomycin 1000mg in 500cc TID  - Oxycodone immediate release 10mg [id number] PRN  - Morphine 1mg IV [id number] PRN  - Ibuprofen 600mg every 6 hours PRN  - Continue to monitor O2 saturation  - If O2 saturation < 90%, provide supplemental O2 as needed.        #Pneumonia  Patient presented with SOB and has a recent history of being treated for MRSA pneumonia with linezolid. She is complaining about left sided chest pain as well. Her CURB 65 score is 0 which corresponds to a 0.6% chance of mortality at 30 days. CT of the chest did demonstrate cavitation with parenchymal inflammation, consistent with her history of MRSA infection. Other etiologies like reactive airway disease have been considered as well.      Plan  - IV Zosyn and Vancomycin started  - Guaifenesin 10mg/5cc syrup [id number] PRN  - Blood cultures drawn x2, results pending.  - Urine culture pending  - CTM O2 saturation and vital signs.        #Thrombocytosis  Patient had elevated platelet levels at presentation. She continues to have thrombocytosis despite IV fluids over the night. She most likely has reactive thrombocytosis secondary to her recent MRSA infection and current pleural effusion. If her thrombocytosis remains elevated, she may need to be referred to hematology as an outpatient.     Plan  - Continue to monitor platelet count.       #DVT prophylaxis  - Enoxaparin 30mg for DVT prophylaxis

## 2018-05-03 NOTE — PROGRESS NOTES
Patient Just returned to floor from PACU. PACU nurse gave report and hooked up Chest tubes to suction. Small air leak noted upon arrival, patient has no respiratory distress and sats remain  Above 95%. Charge nurse notified and ICU rapid nurse assessed chest tube and site. No concerns at this time. Patient has no complaints of pain or other needs at this time. Post op vitals in process. Call light within reach. Will continue to monitor.

## 2018-05-03 NOTE — CARE PLAN
Problem: Safety  Goal: Will remain free from falls  Outcome: PROGRESSING AS EXPECTED  Pt educated to use call light before getting out of bed. Call light in reach. Bed locked in lowest position with 2 upper bed rails up. Pt encouraged to sit at edge of bed before standing up. Room floor is free from clutter. Treaded socks on pt. Bed alarm on.

## 2018-05-03 NOTE — PROGRESS NOTES
Upon assessment of chest tube site, small area of crepitus above site going into the shoulder was noted that was not previously assessed. Dr. Majano with [facility] yellow notified of this new assessment. Doctor states he will be by shortly to assess patient.

## 2018-05-03 NOTE — OR NURSING
Pt arrived to pacu awake and on nasal cannula   Moving all extremities.  A & O x 4, able to tolerated sips and chips, family updated.  Medicated analgesics for complaints of pain, did not have any episodes of nausea during recovery. Pt able to perform pelvic tilt,  void on bedpan, pericare completed.  VSS, NSR on monitor, ASPAN criteria for Phase I Recovery met and patient able to progress forward to floor assignment. Lip balm sent to floor, glasses on pt, pain level at acceptable level, pt able to sleep, carry on a conversation without difficulty, ice bag refreshed for transport.

## 2018-05-03 NOTE — CARE PLAN
Problem: Communication  Goal: The ability to communicate needs accurately and effectively will improve  Outcome: MET Date Met: 05/03/18

## 2018-05-03 NOTE — CARE PLAN
Problem: Pain Management  Goal: Pain level will decrease to patient's comfort goal  Outcome: PROGRESSING SLOWER THAN EXPECTED  Pt pain assessed using pain scale and descriptors. Medicated per MAR. Pt complaining of additional pain. Cool pack provided. Collaborated with IDT on pain meds appropriate for pain management.

## 2018-05-03 NOTE — PROGRESS NOTES
Patient came back from surgery with ibanez in place and no order. Patient request to keep ibanez in throughout the night due to pain with chest tubes. [facility] yellow doctor called and okay to leave ibanez in place ordered. Continuing to monitor.

## 2018-05-03 NOTE — OP REPORT
DATE OF OPERATION: 5/2/2018     PREOPERATIVE DIAGNOSIS: left chest empyema    POSTOPERATIVE DIAGNOSIS:  left chest empyema    PROCEDURE PERFORMED: THORACOSCOPY WITH DECORTICATION    SURGEON: Yimi Rivera M.D.    Assistant Dragan Lam M.D.    ANESTHESIOLOGIST: Jaylen Ferrer M.D.    ANESTHESIA: general      INDICATIONS: The patient is a 22 y.o.-year-old female with clinical and radiographic findings empyema. She  is taken to the operating room for Thoracoscopy and decortication.     FINDINGS: emopyema    WOUND CLASSIFICATION: Class IV, Dirty, Infected.    SPECIMEN: Empyema  Tissue  For culture and path    ESTIMATED BLOOD LOSS: 50 mL.    PROCEDURE: Following informed consent, the patient was properly identified, taken to the operating room and placed in supine position where general endotracheal anesthesia was administered. Intravenous antibiotics were administered by the anesthesiologist in correct time interval. Sequential compression devices were employed. After proper padding she was positioned left side up    Final time out completed.    Marcaine 0.5% with epinephrine was used to infiltrate the port sites. A  Mid axillary line incision was made approximate 6th interspace and subcutaneous tissue gently  opened. Port was placed. Carbon dioxide pneumoperitoneum was instilled to assist in deflation.     An additional port was port was passed anterior line  Working space created careful blunt dissection lung from the chest wall under direct vision  The lung was freed circumferentially. Diaphragm and lobes clearly defined  Empyema tissue was removed and sent culture and pathology.   The chest thoroughly irrigated and aspirated.   Final inspection demonstrated no bleeding  The lung was re-expanded anesthesia and seen to fill completely  The lung was collapsed and chest tubes placed under vision through port sites and secured suture  The lung re-expanded.    Dressing were placed  The patient tolerated the  procedure well and there were no apparent complication. All sponge, needle, and instrument counts were correct on 2 separate occasions. She  was awakened, extubated, and transferred to the recovery room in satisfactory condition.     Family was updated  ____________________________________   Yimi Rivera M.D.    DD: 5/2/2018  5:37 PM

## 2018-05-03 NOTE — PROGRESS NOTES
Pharmacy Kinetics 22 y.o. female on vancomycin day # 3  5/3/2018    Currently Dose: Vancomycin 700 mg iv q8hr (~14 mg/kg/dose)  Received Load Dose: Yes     Indication for Treatment: empyema/pneumonia     Pertinent history per medical record: Admitted on 5/1/2018 for suspected pneumonia. Patient with recent bout of MRSA pneumonia at [facility] ([place], [place]) and completed course of linezolid. Imaging on this admission suggestive of empyema. Surgery consulted. Admitted to telemetry.     Other antibiotics: piperacillin/tazobactam 3.375 gm iv q8h     Allergies: Patient has no known allergies.      List concerns for accumulation of vancomycin: minimal concerns noted     Pertinent cultures to date:   Results     Procedure Component Value Units Date/Time    CULTURE TISSUE W/ GRM STAIN [[id number]] Collected:  05/02/18 1705    Order Status:  Completed Specimen:  Other Updated:  05/02/18 2016    ANAEROBIC CULTURE [[id number]] Collected:  05/02/18 1705    Order Status:  Completed Specimen:  Other Updated:  05/02/18 2016    FLUID CULTURE W/GRAM STAIN [[id number]]     Order Status:  No result Specimen:  Body Fluid from Pleural Fluid     URINE CULTURE(NEW) [[id number]] Collected:  05/01/18 1328    Order Status:  Completed Specimen:  Urine Updated:  05/02/18 1442     Significant Indicator NEG     Source UR     Site --     Urine Culture Mixed skin deep <10,000 cfu/mL    Narrative:       Indication for culture:->Emergency Room Patient    MRSA BY PCR (AMP) [[id number]] Collected:  05/02/18 0944    Order Status:  Completed Specimen:  Respirate from Nares Updated:  05/02/18 1122    Narrative:       Collected By:[id number] SULTANA PERRY  Per [facility] Lab Protocol    BLOOD CULTURE [[id number]] Collected:  05/01/18 1052    Order Status:  Completed Specimen:  Blood from Peripheral Updated:  05/02/18 0912     Significant Indicator NEG     Source BLD     Site PERIPHERAL     Blood Culture No Growth    Note: Blood cultures are incubated for 5 days  "and  are monitored continuously.Positive blood cultures  are called to the RN and reported as soon as  they are identified.      Narrative:       Per Hospital Policy: Only change Specimen Src: to \"Line\" if  specified by physician order.    BLOOD CULTURE [[id number]] Collected:  05/01/18 1220    Order Status:  Completed Specimen:  Blood from Peripheral Updated:  05/02/18 0912     Significant Indicator NEG     Source BLD     Site PERIPHERAL     Blood Culture No Growth    Note: Blood cultures are incubated for 5 days and  are monitored continuously.Positive blood cultures  are called to the RN and reported as soon as  they are identified.      Narrative:       Per Hospital Policy: Only change Specimen Src: to \"Line\" if  specified by physician order.    CULTURE RESP W/O GRAM STAIN [[id number]]     Order Status:  Completed Specimen:  Respirate from Sputum     URINALYSIS [[id number]]  (Abnormal) Collected:  05/01/18 1328    Order Status:  Completed Specimen:  Urine Updated:  05/01/18 1400     Color Yellow     Character Clear     Specific Gravity 1.012     Ph >=9.0 (A)     Glucose Negative mg/dL      Ketones Negative mg/dL      Protein Negative mg/dL      Bilirubin Negative     Urobilinogen, Urine 0.2     Nitrite Negative     Leukocyte Esterase Negative     Occult Blood Negative     Micro Urine Req see below     Comment: Microscopic examination not performed when specimen is clear  and chemically negative for protein, blood, leukocyte esterase  and nitrite.         Narrative:       Indication for culture:->Emergency Room Patient        Recent Labs      05/01/18   1052  05/02/18   0235  05/03/18   0631   WBC  10.7  9.4  11.5*   NEUTSPOLYS  75.30*  63.90  76.10*     Recent Labs      05/01/18   1052  05/02/18   0235  05/03/18   0631   BUN  18  11  6*   CREATININE  0.61  0.56  0.56   ALBUMIN  4.0   --    --      Recent Labs      05/03/18   0631   [facility]  10.6     Intake/Output Summary (Last 24 hours) at 05/03/18 0842  Last " "data filed at 18 0600   Gross per 24 hour   Intake               25 ml   Output             1200 ml   Net            -1175 ml      Blood pressure 125/86, pulse 85, temperature 36.7 °C (98 °F), resp. rate 18, height 1.6 m (5' 3\"), weight 61.4 kg (135 lb 5.8 oz), SpO2 99 %, not currently breastfeeding. Temp (24hrs), Av.1 °C (98.7 °F), Min:36.4 °C (97.6 °F), Max:37.9 °C (100.2 °F)    Estimated Creatinine Clearance: 129.2 mL/min (by C-G formula based on SCr of 0.56 mg/dL).    A/P   1. Vancomycin dose change: 1000 mg iv q8h (~16 mg/kg/dose)   2. Next vancomycin level: 18 @0830  3. Goal trough: 16-20 mcg/mL  4. Comments: VS stable. Afebrile. WBC elevated and also post-procedure. CrCl ~129 mL/min. Microbiology pending. Factors for accumulation noted. Trough noted and below goal range. Dose increased via linear kinetics for goal trough of ~16 mcg/mL. Pharmacy will follow and continue to adjust as appropriate.    Yimi Berg, PharmD  "

## 2018-05-04 ENCOUNTER — APPOINTMENT (OUTPATIENT)
Dept: RADIOLOGY | Facility: MEDICAL CENTER | Age: 23
DRG: 163 | End: 2018-05-04
Attending: NURSE PRACTITIONER
Payer: MEDICAID

## 2018-05-04 LAB
ANION GAP SERPL CALC-SCNC: 7 MMOL/L (ref 0–11.9)
BASOPHILS # BLD AUTO: 0.5 % (ref 0–1.8)
BASOPHILS # BLD: 0.05 K/UL (ref 0–0.12)
BUN SERPL-MCNC: 5 MG/DL (ref 8–22)
CALCIUM SERPL-MCNC: 8.4 MG/DL (ref 8.5–10.5)
CHLORIDE SERPL-SCNC: 105 MMOL/L (ref 96–112)
CO2 SERPL-SCNC: 26 MMOL/L (ref 20–33)
CREAT SERPL-MCNC: 0.52 MG/DL (ref 0.5–1.4)
EOSINOPHIL # BLD AUTO: 0.24 K/UL (ref 0–0.51)
EOSINOPHIL NFR BLD: 2.2 % (ref 0–6.9)
ERYTHROCYTE [DISTWIDTH] IN BLOOD BY AUTOMATED COUNT: 45.1 FL (ref 35.9–50)
ERYTHROCYTE [DISTWIDTH] IN BLOOD BY AUTOMATED COUNT: 45.7 FL (ref 35.9–50)
GLUCOSE SERPL-MCNC: 91 MG/DL (ref 65–99)
HCT VFR BLD AUTO: 26.6 % (ref 37–47)
HCT VFR BLD AUTO: 28.2 % (ref 37–47)
HGB BLD-MCNC: 8.5 G/DL (ref 12–16)
HGB BLD-MCNC: 9.1 G/DL (ref 12–16)
HGB RETIC QN AUTO: 30.7 PG/CELL (ref 29–35)
IMM GRANULOCYTES # BLD AUTO: 0.04 K/UL (ref 0–0.11)
IMM GRANULOCYTES NFR BLD AUTO: 0.4 % (ref 0–0.9)
IMM RETICS NFR: 24.4 % (ref 9.3–17.4)
IRON SATN MFR SERPL: 7 % (ref 15–55)
IRON SERPL-MCNC: 18 UG/DL (ref 40–170)
LYMPHOCYTES # BLD AUTO: 1.83 K/UL (ref 1–4.8)
LYMPHOCYTES NFR BLD: 16.5 % (ref 22–41)
MCH RBC QN AUTO: 31 PG (ref 27–33)
MCH RBC QN AUTO: 31.4 PG (ref 27–33)
MCHC RBC AUTO-ENTMCNC: 32 G/DL (ref 33.6–35)
MCHC RBC AUTO-ENTMCNC: 32.3 G/DL (ref 33.6–35)
MCV RBC AUTO: 97.1 FL (ref 81.4–97.8)
MCV RBC AUTO: 97.2 FL (ref 81.4–97.8)
MONOCYTES # BLD AUTO: 1.41 K/UL (ref 0–0.85)
MONOCYTES NFR BLD AUTO: 12.7 % (ref 0–13.4)
MRSA DNA SPEC QL NAA+PROBE: NORMAL
NEUTROPHILS # BLD AUTO: 7.5 K/UL (ref 2–7.15)
NEUTROPHILS NFR BLD: 67.7 % (ref 44–72)
NRBC # BLD AUTO: 0 K/UL
NRBC BLD-RTO: 0 /100 WBC
PLATELET # BLD AUTO: 486 K/UL (ref 164–446)
PLATELET # BLD AUTO: 489 K/UL (ref 164–446)
PMV BLD AUTO: 8.9 FL (ref 9–12.9)
PMV BLD AUTO: 9 FL (ref 9–12.9)
POTASSIUM SERPL-SCNC: 3.8 MMOL/L (ref 3.6–5.5)
RBC # BLD AUTO: 2.74 M/UL (ref 4.2–5.4)
RBC # BLD AUTO: 2.9 M/UL (ref 4.2–5.4)
RETICS # AUTO: 0.05 M/UL (ref 0.04–0.06)
RETICS/RBC NFR: 1.8 % (ref 0.8–2.1)
SIGNIFICANT IND 70042: NORMAL
SITE SITE: NORMAL
SODIUM SERPL-SCNC: 138 MMOL/L (ref 135–145)
SOURCE SOURCE: NORMAL
TIBC SERPL-MCNC: 255 UG/DL (ref 250–450)
WBC # BLD AUTO: 11.1 K/UL (ref 4.8–10.8)
WBC # BLD AUTO: 11.4 K/UL (ref 4.8–10.8)

## 2018-05-04 PROCEDURE — 700105 HCHG RX REV CODE 258: Performed by: INTERNAL MEDICINE

## 2018-05-04 PROCEDURE — A9270 NON-COVERED ITEM OR SERVICE: HCPCS | Performed by: NURSE PRACTITIONER

## 2018-05-04 PROCEDURE — 83550 IRON BINDING TEST: CPT

## 2018-05-04 PROCEDURE — 71045 X-RAY EXAM CHEST 1 VIEW: CPT

## 2018-05-04 PROCEDURE — 700101 HCHG RX REV CODE 250: Performed by: INTERNAL MEDICINE

## 2018-05-04 PROCEDURE — 700111 HCHG RX REV CODE 636 W/ 250 OVERRIDE (IP): Performed by: NURSE PRACTITIONER

## 2018-05-04 PROCEDURE — A9270 NON-COVERED ITEM OR SERVICE: HCPCS | Performed by: STUDENT IN AN ORGANIZED HEALTH CARE EDUCATION/TRAINING PROGRAM

## 2018-05-04 PROCEDURE — 85027 COMPLETE CBC AUTOMATED: CPT

## 2018-05-04 PROCEDURE — 700111 HCHG RX REV CODE 636 W/ 250 OVERRIDE (IP): Performed by: STUDENT IN AN ORGANIZED HEALTH CARE EDUCATION/TRAINING PROGRAM

## 2018-05-04 PROCEDURE — 80048 BASIC METABOLIC PNL TOTAL CA: CPT

## 2018-05-04 PROCEDURE — 94667 MNPJ CHEST WALL 1ST: CPT

## 2018-05-04 PROCEDURE — 94760 N-INVAS EAR/PLS OXIMETRY 1: CPT

## 2018-05-04 PROCEDURE — 700102 HCHG RX REV CODE 250 W/ 637 OVERRIDE(OP): Performed by: STUDENT IN AN ORGANIZED HEALTH CARE EDUCATION/TRAINING PROGRAM

## 2018-05-04 PROCEDURE — 85025 COMPLETE CBC W/AUTO DIFF WBC: CPT

## 2018-05-04 PROCEDURE — 85046 RETICYTE/HGB CONCENTRATE: CPT

## 2018-05-04 PROCEDURE — 94668 MNPJ CHEST WALL SBSQ: CPT

## 2018-05-04 PROCEDURE — 700111 HCHG RX REV CODE 636 W/ 250 OVERRIDE (IP): Performed by: INTERNAL MEDICINE

## 2018-05-04 PROCEDURE — 83540 ASSAY OF IRON: CPT

## 2018-05-04 PROCEDURE — 700102 HCHG RX REV CODE 250 W/ 637 OVERRIDE(OP): Performed by: NURSE PRACTITIONER

## 2018-05-04 PROCEDURE — 36415 COLL VENOUS BLD VENIPUNCTURE: CPT

## 2018-05-04 PROCEDURE — 770006 HCHG ROOM/CARE - MED/SURG/GYN SEMI*

## 2018-05-04 PROCEDURE — 99232 SBSQ HOSP IP/OBS MODERATE 35: CPT | Performed by: INTERNAL MEDICINE

## 2018-05-04 RX ORDER — DIPHENHYDRAMINE HYDROCHLORIDE 50 MG/ML
25 INJECTION INTRAMUSCULAR; INTRAVENOUS EVERY 6 HOURS PRN
Status: DISCONTINUED | OUTPATIENT
Start: 2018-05-04 | End: 2018-05-04

## 2018-05-04 RX ORDER — AMOXICILLIN 250 MG
2 CAPSULE ORAL 2 TIMES DAILY
Status: DISCONTINUED | OUTPATIENT
Start: 2018-05-04 | End: 2018-05-08 | Stop reason: HOSPADM

## 2018-05-04 RX ORDER — DIPHENHYDRAMINE HYDROCHLORIDE 50 MG/ML
25 INJECTION INTRAMUSCULAR; INTRAVENOUS ONCE
Status: COMPLETED | OUTPATIENT
Start: 2018-05-04 | End: 2018-05-04

## 2018-05-04 RX ORDER — BISACODYL 10 MG
10 SUPPOSITORY, RECTAL RECTAL
Status: DISCONTINUED | OUTPATIENT
Start: 2018-05-04 | End: 2018-05-08 | Stop reason: HOSPADM

## 2018-05-04 RX ORDER — DIPHENHYDRAMINE HYDROCHLORIDE 50 MG/ML
INJECTION INTRAMUSCULAR; INTRAVENOUS
Status: ACTIVE
Start: 2018-05-04 | End: 2018-05-05

## 2018-05-04 RX ORDER — POLYETHYLENE GLYCOL 3350 17 G/17G
1 POWDER, FOR SOLUTION ORAL DAILY
Status: DISCONTINUED | OUTPATIENT
Start: 2018-05-04 | End: 2018-05-08 | Stop reason: HOSPADM

## 2018-05-04 RX ADMIN — ACETAMINOPHEN 650 MG: 325 TABLET, FILM COATED ORAL at 23:55

## 2018-05-04 RX ADMIN — VANCOMYCIN HYDROCHLORIDE 1000 MG: 100 INJECTION, POWDER, LYOPHILIZED, FOR SOLUTION INTRAVENOUS at 17:40

## 2018-05-04 RX ADMIN — ENOXAPARIN SODIUM 30 MG: 100 INJECTION SUBCUTANEOUS at 05:21

## 2018-05-04 RX ADMIN — ACETAMINOPHEN 650 MG: 325 TABLET, FILM COATED ORAL at 17:37

## 2018-05-04 RX ADMIN — ACETAMINOPHEN 650 MG: 325 TABLET, FILM COATED ORAL at 12:19

## 2018-05-04 RX ADMIN — OXYCODONE HYDROCHLORIDE 10 MG: 10 TABLET ORAL at 16:26

## 2018-05-04 RX ADMIN — IRON SUCROSE 200 MG: 20 INJECTION, SOLUTION INTRAVENOUS at 11:30

## 2018-05-04 RX ADMIN — OXYCODONE HYDROCHLORIDE 10 MG: 10 TABLET ORAL at 21:20

## 2018-05-04 RX ADMIN — ACETAMINOPHEN 650 MG: 325 TABLET, FILM COATED ORAL at 05:20

## 2018-05-04 RX ADMIN — MORPHINE SULFATE 1 MG: 4 INJECTION INTRAVENOUS at 20:39

## 2018-05-04 RX ADMIN — DIPHENHYDRAMINE HYDROCHLORIDE 25 MG: 50 INJECTION, SOLUTION INTRAMUSCULAR; INTRAVENOUS at 12:50

## 2018-05-04 RX ADMIN — PIPERACILLIN AND TAZOBACTAM 4.5 G: 4; .5 INJECTION, POWDER, LYOPHILIZED, FOR SOLUTION INTRAVENOUS; PARENTERAL at 20:40

## 2018-05-04 RX ADMIN — STANDARDIZED SENNA CONCENTRATE AND DOCUSATE SODIUM 2 TABLET: 8.6; 5 TABLET, FILM COATED ORAL at 17:38

## 2018-05-04 RX ADMIN — OXYCODONE HYDROCHLORIDE 10 MG: 10 TABLET ORAL at 09:46

## 2018-05-04 RX ADMIN — VANCOMYCIN HYDROCHLORIDE 1000 MG: 100 INJECTION, POWDER, LYOPHILIZED, FOR SOLUTION INTRAVENOUS at 01:33

## 2018-05-04 RX ADMIN — IBUPROFEN 600 MG: 600 TABLET, FILM COATED ORAL at 12:19

## 2018-05-04 RX ADMIN — VANCOMYCIN HYDROCHLORIDE 1000 MG: 100 INJECTION, POWDER, LYOPHILIZED, FOR SOLUTION INTRAVENOUS at 05:21

## 2018-05-04 RX ADMIN — POTASSIUM CHLORIDE AND SODIUM CHLORIDE: 450; 150 INJECTION, SOLUTION INTRAVENOUS at 03:14

## 2018-05-04 RX ADMIN — ACETAMINOPHEN 650 MG: 325 TABLET, FILM COATED ORAL at 00:00

## 2018-05-04 RX ADMIN — IBUPROFEN 600 MG: 600 TABLET, FILM COATED ORAL at 17:40

## 2018-05-04 RX ADMIN — PIPERACILLIN AND TAZOBACTAM 4.5 G: 4; .5 INJECTION, POWDER, LYOPHILIZED, FOR SOLUTION INTRAVENOUS; PARENTERAL at 05:21

## 2018-05-04 RX ADMIN — PIPERACILLIN AND TAZOBACTAM 4.5 G: 4; .5 INJECTION, POWDER, LYOPHILIZED, FOR SOLUTION INTRAVENOUS; PARENTERAL at 12:32

## 2018-05-04 RX ADMIN — MORPHINE SULFATE 1 MG: 4 INJECTION INTRAVENOUS at 12:17

## 2018-05-04 RX ADMIN — IBUPROFEN 600 MG: 600 TABLET, FILM COATED ORAL at 05:20

## 2018-05-04 ASSESSMENT — ENCOUNTER SYMPTOMS
EYE DISCHARGE: 0
PALPITATIONS: 0
SHORTNESS OF BREATH: 1
EYE REDNESS: 0
FALLS: 0
CONSTIPATION: 0
BLURRED VISION: 0
BACK PAIN: 0
CHILLS: 0
SPEECH CHANGE: 0
HEADACHES: 0
DIZZINESS: 0
NECK PAIN: 0
NAUSEA: 0
NERVOUS/ANXIOUS: 0
MYALGIAS: 0
ORTHOPNEA: 0
SINUS PAIN: 0
TINGLING: 0
STRIDOR: 0
SORE THROAT: 0
SPUTUM PRODUCTION: 0
SHORTNESS OF BREATH: 0
WEAKNESS: 1
WEAKNESS: 0
PHOTOPHOBIA: 0
HEADACHES: 1
FOCAL WEAKNESS: 0
SPUTUM PRODUCTION: 1
DEPRESSION: 0
COUGH: 1
SENSORY CHANGE: 0
POLYDIPSIA: 0
COUGH: 0
BRUISES/BLEEDS EASILY: 0
ABDOMINAL PAIN: 0
DIARRHEA: 0
FEVER: 0
PND: 0
WHEEZING: 0
ROS GI COMMENTS: BM 5/1
VOMITING: 0
HEMOPTYSIS: 0
DOUBLE VISION: 0

## 2018-05-04 ASSESSMENT — PAIN SCALES - GENERAL
PAINLEVEL_OUTOF10: 3
PAINLEVEL_OUTOF10: 6
PAINLEVEL_OUTOF10: 6
PAINLEVEL_OUTOF10: 0
PAINLEVEL_OUTOF10: 0
PAINLEVEL_OUTOF10: 8
PAINLEVEL_OUTOF10: 6
PAINLEVEL_OUTOF10: 6

## 2018-05-04 NOTE — PROGRESS NOTES
Internal Medicine Interval Note  Note Author: Ignacio Huber M.D.     Name Maritza Swann     1995   Age/Sex 22 y.o. female   MRN [id number]   Code Status Full Code       After 5PM or if no immediate response to page, please call for cross-coverage  Attending/Team: Dr. Bhatia / [place]  See Patient List for primary contact information  Call [phone] to page    1st Call - Day Intern (R1):   Dr. Huber 2nd Call - Day Sr. Resident (R2/R3):   Dr. Gil           Reason for interval visit  (Principal Problem)   Empyema of left pleural space (HCC)    Interval Problem Daily Status Update  (24 hours)   No events overnight, chest tube functioning, suction was DCed by GS today, still has subcutaneous emphysema around the tube insertion site, ordered sputum culture and requested copy of her medical records from [facility] to review C/S results, she has constipation, no BM in the passed 2 days, switched Miralax to scheduled. Instruct the patient to move and sit in chair and use the incentive spirometry.            Review of Systems   Constitutional: Positive for malaise/fatigue. Negative for chills and fever.   HENT: Negative for hearing loss and sinus pain.    Eyes: Negative for blurred vision, photophobia, discharge and redness.   Respiratory: Positive for cough and shortness of breath. Negative for hemoptysis, sputum production, wheezing and stridor.    Cardiovascular: Positive for chest pain. Negative for palpitations, orthopnea, leg swelling and PND.   Gastrointestinal: Negative for abdominal pain, constipation, diarrhea, nausea and vomiting.   Genitourinary: Negative.  Negative for dysuria, frequency, hematuria and urgency.   Musculoskeletal: Negative for back pain, falls, myalgias and neck pain.   Skin: Negative for itching and rash.   Neurological: Negative for dizziness, tingling, sensory change, speech change, focal weakness and weakness.   Endo/Heme/Allergies: Negative  for polydipsia. Does not bruise/bleed easily.   Psychiatric/Behavioral: Negative for depression and suicidal ideas. The patient is not nervous/anxious.        Consultants/Specialty  Surgery, Dr. Rivera    Disposition  Inpatient treated for Empyema.     Quality Measures  Quality-Core Measures   Reviewed items::  Labs reviewed and Medications reviewed  Haas catheter::  No Haas  DVT prophylaxis pharmacological::  Enoxaparin (Lovenox)  Antibiotics:  Treating active infection/contamination beyond 24 hours perioperative coverage          Physical Exam       Vitals:    05/03/18 1944 05/04/18 0339 05/04/18 0739 05/04/18 0823   BP: 135/75 120/64 106/54    Pulse: (!) 108 73 72 76   Resp: 16 16 16 16   Temp: 37.1 °C (98.7 °F) 36.8 °C (98.2 °F) 36.7 °C (98 °F)    SpO2: 90% 98% 96% 95%   Weight: 52.3 kg (115 lb 4.8 oz)      Height:         Body mass index is 20.42 kg/m². Weight: 52.3 kg (115 lb 4.8 oz)  Oxygen Therapy:  Pulse Oximetry: 95 %, O2 (LPM): 0, O2 Delivery: None (Room Air)    Physical Exam   Constitutional: She is oriented to person, place, and time. No distress.   HENT:   Head: Normocephalic and atraumatic.   Eyes: Pupils are equal, round, and reactive to light.   Neck: Normal range of motion. Neck supple.   Cardiovascular: Normal rate and regular rhythm.  Exam reveals no gallop and no friction rub.    No murmur heard.  Pulmonary/Chest: Effort normal. No respiratory distress. She has no wheezes. She exhibits tenderness.   Reduced on the left side, subcutaneous emphysema around the chest tube area   Abdominal: Soft. Bowel sounds are normal. She exhibits no distension. There is no tenderness.   Musculoskeletal: She exhibits no edema.   Neurological: She is alert and oriented to person, place, and time. No cranial nerve deficit.   Skin: No rash noted. She is not diaphoretic. No erythema. No pallor.   Psychiatric: Mood and affect normal.   Nursing note and vitals reviewed.        Lab Data Review:         5/3/2018  7:23  PM    Recent Labs      05/02/18   0235  05/03/18   0631  05/04/18   0157   SODIUM  138  138  138   POTASSIUM  4.1  3.7  3.8   CHLORIDE  107  102  105   CO2  23  26  26   BUN  11  6*  5*   CREATININE  0.56  0.56  0.52   CALCIUM  8.7  8.5  8.4*       Recent Labs      05/02/18   0235  05/03/18   0631  05/04/18   0157   GLUCOSE  84  94  91       Recent Labs      05/03/18   0631  05/04/18   0157  05/04/18   0825   RBC  2.99*  2.74*  2.90*   HEMOGLOBIN  9.3*  8.5*  9.1*   HEMATOCRIT  29.3*  26.6*  28.2*   PLATELETCT  526*  486*  489*   IRON   --    --   18*   TOTIRONBC   --    --   255       Recent Labs      05/02/18   0235  05/03/18   0631  05/04/18   0157  05/04/18   0825   WBC  9.4  11.5*  11.1*  11.4*   NEUTSPOLYS  63.90  76.10*  67.70   --    LYMPHOCYTES  23.80  10.00*  16.50*   --    MONOCYTES  10.20  11.90  12.70   --    EOSINOPHILS  1.00  1.00  2.20   --    BASOPHILS  0.60  0.60  0.50   --            Assessment/Plan     * Empyema of left pleural space (HCC)- (present on admission)   Assessment & Plan    - Surgery was consulted, Dr Rivera did thoracoscopy, decortication, drainage of pleural effusion with 2 chest tubes on 5/2.  - on Vanc and zosyn   - Currently draining serosanguineous discharge  - There is subcutaneous emphysema. Tubes functioning.  - tissue cultures pending           PNA (pneumonia)- (present on admission)   Assessment & Plan    -Recent history of staph pneumonia post flu, treated with linezolid, initially better and then worse, presented with 2 days history of increasing left-sided chest pain and shortness of breath.  - CTA chest Bilateral patchy opacities with cavitation, left much greater than right, are likely related to multifocal pneumonia given history of MRSA pneumonia. More confluent opacity in the medial left upper lobe contains a loculated fluid consistent with necrosis or abscess formation. Low-density left pleural effusion. Enhancing pleura suggest early empyema formation.   Plan:  -  Blood cultures X2 NGTD, Pleural tissue Cx NGTD  - on Vanc and Zosyn  - Surgery was consulted, Dr Rivera did thoracoscopy, decortication, drainage of pleural effusion with 2 chest tubes on 5/2.  - CT functioning, suction disconnected today.           Thrombocytosis (HCC)- (present on admission)   Assessment & Plan    - Platelet count 726  - Possibly due to dehydration or reactive  - Monitor platelet count

## 2018-05-04 NOTE — CARE PLAN
Problem: Nutritional:  Goal: Achieve adequate nutritional intake  Patient will consume >75% of meals of regular diet   Outcome: PROGRESSING SLOWER THAN EXPECTED  PO intake has been variable in the last two days between 25-75%.   Nutrition rep to continue to see for ongoing meal and snack preferences.   Encourage PO.    RD monitoring.

## 2018-05-04 NOTE — PROGRESS NOTES
"  Trauma/Surgical Progress Note    Author: Sayda Haas Date & Time created: 5/4/2018   8:36 AM      Interval Events:  POD #2 Thorascoscopy with decortication  CT #1 20cc output, CT #2 5cc output, no airleak  Continue suction for now, awaiting CXR results  Encourage mobilization, up to chair TID, aggressive pulmonary hygiene  Awaiting bed on GSU    Review of Systems   Constitutional: Negative for fever.   Respiratory: Positive for shortness of breath (Mild). Negative for cough.    Cardiovascular: Positive for chest pain (Left chest wall pain). Negative for palpitations.   Gastrointestinal: Negative for constipation, nausea and vomiting.        BM 5/1   Genitourinary:        Voiding   Neurological: Negative for dizziness and headaches.     Hemodynamics:  Blood pressure 106/54, pulse 76, temperature 36.7 °C (98 °F), resp. rate 16, height 1.6 m (5' 3\"), weight 52.3 kg (115 lb 4.8 oz), SpO2 95 %, not currently breastfeeding.     Respiratory:    Respiration: 16, Pulse Oximetry: 95 %, O2 Daily Delivery Respiratory : Room Air with O2 Available  Chest Tube Group 1 (A) Left 28-Tube Status / Drainage: Small;Patent, Chest Tube Group 2 (B) Left 32-Tube Status / Drainage: Draining;Patent, Chest Tube Group 1 (A) Left 28-Device: Suction 20 cm Water;Suction Other (Comments), Chest Tube Group 2 (B) Left 32-Device: Suction 20 cm Water  Work Of Breathing / Effort: Mild  RUL Breath Sounds: Clear, RML Breath Sounds: Diminished, RLL Breath Sounds: Diminished, CHINO Breath Sounds: Clear, LLL Breath Sounds: Diminished  Fluids:    Intake/Output Summary (Last 24 hours) at 05/04/18 0836  Last data filed at 05/04/18 0700   Gross per 24 hour   Intake              200 ml   Output             2325 ml   Net            -2125 ml     Admit Weight: 46.8 kg (103 lb 2.8 oz)  Current Weight: 52.3 kg (115 lb 4.8 oz)    Physical Exam   Constitutional: She is oriented to person, place, and time. She appears well-developed.   HENT:   Head: Normocephalic. "   Neck: No JVD present. No tracheal deviation present.   Cardiovascular: Normal rate.    Pulmonary/Chest: Effort normal. No respiratory distress. She exhibits tenderness (Left chest wall tenderness).   Left chest tubes x 2 to suction, serosanguineous drainage  Left chest subcutaneous air   Abdominal: Soft. She exhibits no distension. There is no tenderness.   Musculoskeletal:   Moves all extremities   Neurological: She is alert and oriented to person, place, and time.   Skin: Skin is warm and dry.   Nursing note and vitals reviewed.      Medical Decision Making/Problem List:    Active Hospital Problems    Diagnosis   • PNA (pneumonia) [J18.9]     Priority: High   • Empyema of left pleural space (HCC) [J86.9]     Priority: High     5/2 Thoracoscopy with decortication, 2 chest tubes placed   5/3 CXR pending  Chest tube #1 [facility] 210ml total output / 20 cc output in 24 hours / no air leak   Chest tube #2 [facility] 35ml total output / 5 cc output in 24 hours / no air leak   Yiim Rivera MD.  General surgery.     • Thrombocytosis (HCC) [D47.3]     Core Measures & Quality Metrics:  Labs reviewed, Medications reviewed and Radiology images reviewed  Haas catheter: No Haas      DVT Prophylaxis: Enoxaparin (Lovenox)            Total Score: 0    Discussed patient condition with RN, Patient and general surgery. Dr. Rivera

## 2018-05-04 NOTE — NON-PROVIDER
Internal Medicine Medical Student Note  Note Author: Hernan Shields, Student    Name Mraitza Swann     1995   Age/Sex 22 y.o. female   MRN [id number]   Code Status Full Code     After 5PM or if no immediate response to page, please call for cross-coverage  Attending/Team: Dr. Bhatia See Patient List for primary contact information  Call [phone] to page after hours   1st Call - Day Intern (R1):   Dr. Garber 2nd Call - Day Sr. Resident (R2/R3):   Dr. Gil         Reason for interval visit  (Principal Problem)   Empyema of left pleural space (HCC)    Interval Problem Daily Status Update  (24 hours)   No acute overnight events. There were concerns about the subcutaneous emphysema that was present after the chest tube insertion; however, general surgery reports that this is a normal finding after such procedures. For her pain, she was given IV morphine PRN for better control. Ibanez was removed yesterday. Lab only received tissue from surgery but no pleural fluid, results pending. Her pain is better tolerated due to her IV pain medication regimen. She was able to ambulate briefly yesterday while the nursing staff were changing her bed sheets. Her ibanez was removed and she was able to urinate without issue. She is complaining of a  productive cough that she has noticed since her operation. Records from [facility] are being requested for better targeted therapy of her empyema (until lab results return). Patient has not had a bowel movement since her surgery.    Review of Systems   Constitutional: Negative for chills and fever.   HENT: Negative for hearing loss, sore throat and tinnitus.    Eyes: Negative for blurred vision and double vision.   Respiratory: Positive for cough and sputum production. Negative for shortness of breath and wheezing.    Cardiovascular: Positive for chest pain. Negative for palpitations.   Gastrointestinal: Negative for abdominal pain, constipation, diarrhea and  vomiting.   Genitourinary: Negative for dysuria and hematuria.   Neurological: Positive for weakness and headaches. Negative for dizziness.       Physical Exam       Vitals:    05/03/18 1200 05/03/18 1222 05/03/18 1944 05/04/18 0339   BP: 126/75  135/75 120/64   Pulse: (!) 102 (!) 101 (!) 108 73   Resp: 18 16 16 16   Temp: 37.6 °C (99.7 °F)  37.1 °C (98.7 °F) 36.8 °C (98.2 °F)   SpO2: 98% 98% 90% 98%   Weight:   52.3 kg (115 lb 4.8 oz)    Height:         Body mass index is 20.42 kg/m². Weight: 52.3 kg (115 lb 4.8 oz)  Oxygen Therapy:  Pulse Oximetry: 98 %, O2 (LPM): 0, O2 Delivery: None (Room Air)      Physical Exam   Constitutional: She is well-developed, well-nourished, and in no distress. No distress.   Patient was able to take full breaths and speak in complete sentences.    HENT:   Head: Normocephalic and atraumatic.   Cardiovascular: Normal rate, regular rhythm and normal heart sounds.  Exam reveals no gallop and no friction rub.    No murmur heard.  Pulmonary/Chest: No respiratory distress. She has no wheezes.   Crepitus was heard around the area of chest tube insertion. The lateral left neck, shoulder and back also have crepitus and is painful to the touch. No erythema or oozing seen from the chest tube insertion dressing.   Abdominal: Soft. Bowel sounds are normal. She exhibits no distension. There is no tenderness.   Neurological: She is alert.   Skin: Skin is warm and dry. No rash noted. She is not diaphoretic. No erythema. No pallor.   Psychiatric: Affect normal.       Labs  CBC  - WBC = 11.1; elevated  - Hb = 8.5  - Hct = 26.6; H/H are both low; consistent with normocytic anemia vs ACD.  - Plt = 486; elevated, could be due her baseline or reactive thrombocytosis.    Reticulocyte count  - Immature reticulocyte count = 24.4 (elevated)  - Reticulocyte Hb Equivalent = 30.7 (lower end of normal); DARIUS possible    Fe Studies  - Fe = 18; low, consistent with DARIUS.  - TIBC = 255 (normal)  - Fe Sat = 7%; low,  consistent with DARIUS    CMP  - Electrolytes WNL  - Ca = 8.4; low, no albumin level to account for correction, most likely WNL.    Imaging  Portable CXR (05/4/2018 @ 8:28 AM)  1.  No pneumothorax identified with 2 left chest tubes in place.  2.  Unchanging left lower lobe opacity.  3.  Development of minimal atelectasis or edema in the right lower lobe.  Interpretation: Proper placement of chest tubes unchanged from yesterday.     Assessment/Plan     #Empyema  Patient has been having SOB for 2 days now and is worse when she is laying on her left side. Although she is able to speak in complete sentences, she takes deep breaths and looks mildly uncomfortable. This is most likely due to an empyema as demonstrated by CXR in the ED and confirmed on the CT scan; however other etiologies like PE were considered especially since her D-dimer came back to 2687 (elevated). Her Wells score for PE was 1.5 which corresponds to a 1.3% risk of actually having a PE. CT did not demonstrate PE but demonstrated the appearance of loculated fluid. Patient was sent to surgery for thorascopy with decortication, tissue cultures were sent to the lab and results are pending.     Plan  - IV Zosyn 4.5gm TID.  - IV Vancomycin 1000mg in 500cc TID  - Oxycodone immediate release 10mg [id number] PRN  - Morphine 1mg IV [id number] PRN  - Ibuprofen 600mg every 6 hours PRN  - Continue to monitor O2 saturation  - If O2 saturation < 90%, provide supplemental O2 as needed.        #Pneumonia  Patient presented with SOB and has a recent history of being treated for MRSA pneumonia with linezolid. She is complaining about left sided chest pain as well. Her CURB 65 score is 0 which corresponds to a 0.6% chance of mortality at 30 days. CT of the chest did demonstrate cavitation with parenchymal inflammation, consistent with her history of MRSA infection. Other etiologies like reactive airway disease have been considered as well. She is having productive cough but her  vitals have thus far been WNL. CXR are showing atelectasis in the right lower lobe and are cony monitored.     Plan  - IV Zosyn and Vancomycin started  - Guaifenesin 10mg/5cc syrup [id number] PRN  - Blood cultures drawn x2, results pending.  - Urine culture pending  - CTM O2 saturation and vital signs.        #Thrombocytosis  Patient had elevated platelet levels at presentation. She continues to have thrombocytosis despite IV fluids over the night. She most likely has reactive thrombocytosis secondary to her recent MRSA infection and recent pleural effusion. If her thrombocytosis remains elevated, she may need to be referred to hematology as an outpatient.     Plan  - Continue to monitor platelet count.        #Anemia  Patient has been having low H/H since her admission to the hospital. Her Fe studies demonstrated that she has decreased Fe and Fe saturation which is most consistent DARIUS. ACD was also considered due to her previous MRSA infection about 1 month ago. She is complaining of weakness and fatigue but it is unclear if this is due to the surgery and/or due to the anemia.    Plan  - Fe Sucrose 200mg injection daily  - Monitor H/H to trend levels overtime.  - Anticipate that her H/H will increase with Fe supplementation.      #Constipation  Patient has not had a bowel movement since her surgery. She denies any abdominal pain and is able to pass gas (no obstipation). Constipation is most likely due to her opiate pain medication regimen.    Plan  - Add daily miralax to induce bowel movement.       #DVT prophylaxis  - Enoxaparin 30mg for DVT prophylaxis

## 2018-05-04 NOTE — PROGRESS NOTES
"Pharmacy Kinetics 22 y.o. female on vancomycin day # 4 2018    Currently Dose: Vancomycin 700 mg iv q8hr (0100 0900 1700)     Indication for Treatment: empyema/pneumonia     Pertinent history per medical record: Admitted on 2018 for suspected pneumonia. Patient with recent bout of MRSA pneumonia at [facility] ([place], [place]) and completed course of linezolid. Imaging on this admission suggestive of empyema. Surgery consulted. Admitted to telemetry.     Other antibiotics: piperacillin/tazobactam 3.375 gm iv q8h     Allergies: Patient has no known allergies.      List concerns for accumulation of vancomycin: minimal concerns noted    Pertinent cultures to date:   18:PBCx2:NGTD  18:UR:NGTD  18:Empyema Lt Lung,TISS:NGTD    Recent Labs      18   1052  18   0235  18   0631  18   0157  18   0825   WBC  10.7  9.4  11.5*  11.1*  11.4*   NEUTSPOLYS  75.30*  63.90  76.10*  67.70   --      Recent Labs      18   1052  18   0235  18   0631  18   0157   BUN  18  11  6*  5*   CREATININE  0.61  0.56  0.56  0.52   ALBUMIN  4.0   --    --    --      Recent Labs      18   0631   VANCOTROUGH  10.6     Intake/Output Summary (Last 24 hours) at 18 0933  Last data filed at 18 0700   Gross per 24 hour   Intake              200 ml   Output             2325 ml   Net            -2125 ml      Blood pressure 106/54, pulse 76, temperature 36.7 °C (98 °F), resp. rate 16, height 1.6 m (5' 3\"), weight 52.3 kg (115 lb 4.8 oz), SpO2 95 %, not currently breastfeeding. Temp (24hrs), Av.1 °C (98.7 °F), Min:36.7 °C (98 °F), Max:37.6 °C (99.7 °F)      A/P   1. Vancomycin dose change: No cahnge   2. Next vancomycin level: 18@0830  3. Goal trough: 16-20 mcg/mL   4. Comments: S/p decortication, drainage of pleural effusion with 2 chest tubes on . WBC slightly elevated. Cx NGTD , afebrile over interval. Renal indices appear stable , level ordered. "     Kishor Sutherland PharmD BCPS       IV Iron Per Pharmacy Note    Patient Lean Body Weight:  52.3 kg  Reason for Iron Replacement: Iron Deficiency Anemia      Lab Results   Component Value Date/Time    WBC 11.4 (H) 05/04/2018 08:25 AM    RBC 2.90 (L) 05/04/2018 08:25 AM    HEMOGLOBIN 9.1 (L) 05/04/2018 08:25 AM    HEMATOCRIT 28.2 (L) 05/04/2018 08:25 AM    MCV 97.2 05/04/2018 08:25 AM    MCH 31.4 05/04/2018 08:25 AM    MCHC 32.3 (L) 05/04/2018 08:25 AM    MPV 9.0 05/04/2018 08:25 AM       Recent Labs      05/04/18   0825   IRON  18*         Recent Labs      05/04/18   0157   CREATININE  0.52          Assessment/Plan:  IV Iron Indicated.   Venofer 200mg iv q24hr x 5 days     Kishor Sutherland PharmD BCPS

## 2018-05-04 NOTE — PROGRESS NOTES
Internal Medicine Interval Note  Note Author: Edwige Gil M.D.     Name Maritza Swann     1995   Age/Sex 22 y.o. female   MRN [id number]   Code Status Full Code       After 5PM or if no immediate response to page, please call for cross-coverage  Attending/Team: Dr. Bhatia / [place]  See Patient List for primary contact information  Call [phone] to page    1st Call - Day Intern (R1):   Dr. Huber 2nd Call - Day Sr. Resident (R2/R3):   Dr. Gil           Reason for interval visit  (Principal Problem)   Empyema of left pleural space (HCC)    Interval Problem Daily Status Update  (24 hours)   - 2 chest tubes draining serosanguineous fluid, 180 cc, 30 cc, subcutaneous emphysema, air leak this morning, discussed with surgery, this is expected finding after postop. Oxygenation remained stable, chest x-ray did not show pneumothorax.  - Blood, pleural tissue did not show any growth so far. Call lab about pleural fluid sample for chemistry. they did not receive pleural fluid from OR, only received tissue for culture and Gram stain. No way to get sample from closed chest tube unit now.  - Her main complaint is pain, inadequate control, added IV morphine prn with adequate relief      Review of Systems   Constitutional: Negative for chills and fever.   Respiratory: Positive for cough and shortness of breath. Negative for sputum production.    Cardiovascular: Positive for chest pain. Negative for palpitations, leg swelling and PND.   Gastrointestinal: Negative for abdominal pain, constipation, diarrhea, nausea and vomiting.   Genitourinary: Negative.    Skin: Negative for rash.   Neurological: Negative for sensory change, speech change and focal weakness.   Psychiatric/Behavioral: Negative for depression and suicidal ideas.       Consultants/Specialty  Surgery, Dr. Rivera    Disposition  Inpatient, IV Ab and chest tube managment    Quality Measures  Quality-Core Measures   Reviewed items::  Labs  reviewed and Medications reviewed  Haas catheter::  No Haas  DVT prophylaxis pharmacological::  Enoxaparin (Lovenox)  Antibiotics:  Treating active infection/contamination beyond 24 hours perioperative coverage          Physical Exam       Vitals:    05/03/18 0300 05/03/18 0830 05/03/18 1200 05/03/18 1222   BP: 125/86 131/72 126/75    Pulse: 85 81 (!) 102 (!) 101   Resp: 18 18 18 16   Temp: 36.7 °C (98 °F) 36.3 °C (97.3 °F) 37.6 °C (99.7 °F)    SpO2: 99% 98% 98% 98%   Weight:       Height:         Body mass index is 23.98 kg/m². Weight: 61.4 kg (135 lb 5.8 oz)  Oxygen Therapy:  Pulse Oximetry: 98 %, O2 (LPM): 1, O2 Delivery: Silicone Nasal Cannula    Physical Exam   Constitutional: She is oriented to person, place, and time. No distress.   HENT:   Head: Normocephalic and atraumatic.   Eyes: Pupils are equal, round, and reactive to light.   Neck: Neck supple.   Cardiovascular: Normal rate and regular rhythm.    Pulmonary/Chest: She has no wheezes.   Reduced on the left side, subcutaneous emphysema around the chest tube area   Abdominal: Soft. Bowel sounds are normal.   Musculoskeletal: She exhibits no edema.   Neurological: She is alert and oriented to person, place, and time.   Skin: She is not diaphoretic.   Psychiatric: Mood and affect normal.   Nursing note and vitals reviewed.        Lab Data Review:         5/3/2018  7:23 PM    Recent Labs      05/01/18   1052  05/02/18   0235  05/03/18   0631   SODIUM  137  138  138   POTASSIUM  4.0  4.1  3.7   CHLORIDE  100  107  102   CO2  27  23  26   BUN  18  11  6*   CREATININE  0.61  0.56  0.56   CALCIUM  9.8  8.7  8.5       Recent Labs      05/01/18   1052  05/02/18   0235  05/03/18   0631   ALTSGPT  33   --    --    ASTSGOT  18   --    --    ALKPHOSPHAT  71   --    --    TBILIRUBIN  0.7   --    --    GLUCOSE  84  84  94       Recent Labs      05/01/18   1052  05/02/18   0235  05/03/18   0631   RBC  3.77*  3.09*  2.99*   HEMOGLOBIN  12.0  9.8*  9.3*   HEMATOCRIT   36.0*  30.2*  29.3*   PLATELETCT  726*  577*  526*       Recent Labs      05/01/18   1052  05/02/18   0235  05/03/18   0631   WBC  10.7  9.4  11.5*   NEUTSPOLYS  75.30*  63.90  76.10*   LYMPHOCYTES  16.70*  23.80  10.00*   MONOCYTES  6.90  10.20  11.90   EOSINOPHILS  0.10  1.00  1.00   BASOPHILS  0.60  0.60  0.60   ASTSGOT  18   --    --    ALTSGPT  33   --    --    ALKPHOSPHAT  71   --    --    TBILIRUBIN  0.7   --    --            Assessment/Plan     * Empyema of left pleural space (HCC)- (present on admission)   Assessment & Plan    - Surgery was consulted, Dr Rivera did thoracoscopy, decortication, drainage of pleural effusion with 2 chest tubes on 5/2.  - on Vanc and zosyn   - Currently draining serosanguineous discharge  - There is subcutaneous emphysema, continuous air leak through one of the chest tubes. Surgery, it is expected to leak air for 2 or 3 days postop.        PNA (pneumonia)- (present on admission)   Assessment & Plan    -Recent history of staph pneumonia post flu, treated with linezolid, initially better and then worse, presented with 2 days history of increasing left-sided chest pain and shortness of breath.  - CTA chest Bilateral patchy opacities with cavitation, left much greater than right, are likely related to multifocal pneumonia given history of MRSA pneumonia. More confluent opacity in the medial left upper lobe contains a loculated fluid consistent with necrosis or abscess formation. Low-density left pleural effusion. Enhancing pleura suggest early empyema formation.   Plan:  - Blood cultures X2 NGTD, Pleural tissue Cx NGTD  - on Vanc and Zosyn D2 to cover MRSA   - Surgery was consulted, Dr Rivera did thoracoscopy, decortication, drainage of pleural effusion with 2 chest tubes on 5/2.        Thrombocytosis (HCC)- (present on admission)   Assessment & Plan    - Platelet count 726  - Possibly due to dehydration or reactive  - Monitor platelet count

## 2018-05-04 NOTE — PROGRESS NOTES
Bed side report received from day shift nurse, patient assessed and resting comfortably at this time with no complaints of pain or other needs voiced. Call light within reach, will continue to monitor.

## 2018-05-05 ENCOUNTER — APPOINTMENT (OUTPATIENT)
Dept: RADIOLOGY | Facility: MEDICAL CENTER | Age: 23
DRG: 163 | End: 2018-05-05
Attending: NURSE PRACTITIONER
Payer: MEDICAID

## 2018-05-05 ENCOUNTER — APPOINTMENT (OUTPATIENT)
Dept: RADIOLOGY | Facility: MEDICAL CENTER | Age: 23
DRG: 163 | End: 2018-05-05
Attending: STUDENT IN AN ORGANIZED HEALTH CARE EDUCATION/TRAINING PROGRAM
Payer: MEDICAID

## 2018-05-05 LAB
ANION GAP SERPL CALC-SCNC: 8 MMOL/L (ref 0–11.9)
BACTERIA TISS AEROBE CULT: NORMAL
BASOPHILS # BLD AUTO: 0.5 % (ref 0–1.8)
BASOPHILS # BLD: 0.05 K/UL (ref 0–0.12)
BUN SERPL-MCNC: 5 MG/DL (ref 8–22)
CALCIUM SERPL-MCNC: 8 MG/DL (ref 8.5–10.5)
CHLORIDE SERPL-SCNC: 109 MMOL/L (ref 96–112)
CO2 SERPL-SCNC: 25 MMOL/L (ref 20–33)
CREAT SERPL-MCNC: 0.49 MG/DL (ref 0.5–1.4)
EOSINOPHIL # BLD AUTO: 0.34 K/UL (ref 0–0.51)
EOSINOPHIL NFR BLD: 3.5 % (ref 0–6.9)
ERYTHROCYTE [DISTWIDTH] IN BLOOD BY AUTOMATED COUNT: 45.4 FL (ref 35.9–50)
GLUCOSE SERPL-MCNC: 93 MG/DL (ref 65–99)
GRAM STN SPEC: NORMAL
HCT VFR BLD AUTO: 26.5 % (ref 37–47)
HGB BLD-MCNC: 8.4 G/DL (ref 12–16)
IMM GRANULOCYTES # BLD AUTO: 0.08 K/UL (ref 0–0.11)
IMM GRANULOCYTES NFR BLD AUTO: 0.8 % (ref 0–0.9)
LYMPHOCYTES # BLD AUTO: 2.08 K/UL (ref 1–4.8)
LYMPHOCYTES NFR BLD: 21.2 % (ref 22–41)
MCH RBC QN AUTO: 31.2 PG (ref 27–33)
MCHC RBC AUTO-ENTMCNC: 31.7 G/DL (ref 33.6–35)
MCV RBC AUTO: 98.5 FL (ref 81.4–97.8)
MONOCYTES # BLD AUTO: 0.96 K/UL (ref 0–0.85)
MONOCYTES NFR BLD AUTO: 9.8 % (ref 0–13.4)
NEUTROPHILS # BLD AUTO: 6.3 K/UL (ref 2–7.15)
NEUTROPHILS NFR BLD: 64.2 % (ref 44–72)
NRBC # BLD AUTO: 0 K/UL
NRBC BLD-RTO: 0 /100 WBC
PLATELET # BLD AUTO: 454 K/UL (ref 164–446)
PMV BLD AUTO: 9 FL (ref 9–12.9)
POTASSIUM SERPL-SCNC: 3.5 MMOL/L (ref 3.6–5.5)
RBC # BLD AUTO: 2.69 M/UL (ref 4.2–5.4)
SIGNIFICANT IND 70042: NORMAL
SITE SITE: NORMAL
SODIUM SERPL-SCNC: 142 MMOL/L (ref 135–145)
SOURCE SOURCE: NORMAL
VANCOMYCIN TROUGH SERPL-MCNC: 17.7 UG/ML (ref 10–20)
VANCOMYCIN TROUGH SERPL-MCNC: 21.1 UG/ML (ref 10–20)
WBC # BLD AUTO: 9.8 K/UL (ref 4.8–10.8)

## 2018-05-05 PROCEDURE — 94760 N-INVAS EAR/PLS OXIMETRY 1: CPT

## 2018-05-05 PROCEDURE — 71045 X-RAY EXAM CHEST 1 VIEW: CPT

## 2018-05-05 PROCEDURE — A9270 NON-COVERED ITEM OR SERVICE: HCPCS | Performed by: NURSE PRACTITIONER

## 2018-05-05 PROCEDURE — 700105 HCHG RX REV CODE 258

## 2018-05-05 PROCEDURE — 700111 HCHG RX REV CODE 636 W/ 250 OVERRIDE (IP): Performed by: INTERNAL MEDICINE

## 2018-05-05 PROCEDURE — 80202 ASSAY OF VANCOMYCIN: CPT | Mod: 91

## 2018-05-05 PROCEDURE — 700105 HCHG RX REV CODE 258: Performed by: INTERNAL MEDICINE

## 2018-05-05 PROCEDURE — 700111 HCHG RX REV CODE 636 W/ 250 OVERRIDE (IP): Performed by: FAMILY MEDICINE

## 2018-05-05 PROCEDURE — 770006 HCHG ROOM/CARE - MED/SURG/GYN SEMI*

## 2018-05-05 PROCEDURE — 85025 COMPLETE CBC W/AUTO DIFF WBC: CPT

## 2018-05-05 PROCEDURE — 80048 BASIC METABOLIC PNL TOTAL CA: CPT

## 2018-05-05 PROCEDURE — 94668 MNPJ CHEST WALL SBSQ: CPT

## 2018-05-05 PROCEDURE — 700102 HCHG RX REV CODE 250 W/ 637 OVERRIDE(OP): Performed by: STUDENT IN AN ORGANIZED HEALTH CARE EDUCATION/TRAINING PROGRAM

## 2018-05-05 PROCEDURE — 36415 COLL VENOUS BLD VENIPUNCTURE: CPT

## 2018-05-05 PROCEDURE — A9270 NON-COVERED ITEM OR SERVICE: HCPCS | Performed by: STUDENT IN AN ORGANIZED HEALTH CARE EDUCATION/TRAINING PROGRAM

## 2018-05-05 PROCEDURE — 700101 HCHG RX REV CODE 250: Performed by: INTERNAL MEDICINE

## 2018-05-05 PROCEDURE — 700102 HCHG RX REV CODE 250 W/ 637 OVERRIDE(OP): Performed by: NURSE PRACTITIONER

## 2018-05-05 PROCEDURE — 99232 SBSQ HOSP IP/OBS MODERATE 35: CPT | Performed by: INTERNAL MEDICINE

## 2018-05-05 RX ORDER — SODIUM CHLORIDE 9 MG/ML
INJECTION, SOLUTION INTRAVENOUS
Status: COMPLETED
Start: 2018-05-05 | End: 2018-05-05

## 2018-05-05 RX ADMIN — OXYCODONE HYDROCHLORIDE 10 MG: 10 TABLET ORAL at 01:32

## 2018-05-05 RX ADMIN — OXYCODONE HYDROCHLORIDE 10 MG: 10 TABLET ORAL at 13:58

## 2018-05-05 RX ADMIN — HYDROMORPHONE HYDROCHLORIDE 1 MG: 10 INJECTION, SOLUTION INTRAMUSCULAR; INTRAVENOUS; SUBCUTANEOUS at 05:26

## 2018-05-05 RX ADMIN — IBUPROFEN 600 MG: 600 TABLET, FILM COATED ORAL at 06:39

## 2018-05-05 RX ADMIN — ACETAMINOPHEN 650 MG: 325 TABLET, FILM COATED ORAL at 12:00

## 2018-05-05 RX ADMIN — POTASSIUM CHLORIDE AND SODIUM CHLORIDE: 450; 150 INJECTION, SOLUTION INTRAVENOUS at 04:36

## 2018-05-05 RX ADMIN — ACETAMINOPHEN 650 MG: 325 TABLET, FILM COATED ORAL at 18:00

## 2018-05-05 RX ADMIN — PIPERACILLIN AND TAZOBACTAM 4.5 G: 4; .5 INJECTION, POWDER, LYOPHILIZED, FOR SOLUTION INTRAVENOUS; PARENTERAL at 05:31

## 2018-05-05 RX ADMIN — PIPERACILLIN AND TAZOBACTAM 4.5 G: 4; .5 INJECTION, POWDER, LYOPHILIZED, FOR SOLUTION INTRAVENOUS; PARENTERAL at 20:26

## 2018-05-05 RX ADMIN — SODIUM CHLORIDE: 9 INJECTION, SOLUTION INTRAVENOUS at 05:45

## 2018-05-05 RX ADMIN — IBUPROFEN 600 MG: 600 TABLET, FILM COATED ORAL at 11:50

## 2018-05-05 RX ADMIN — VANCOMYCIN HYDROCHLORIDE 1000 MG: 100 INJECTION, POWDER, LYOPHILIZED, FOR SOLUTION INTRAVENOUS at 04:37

## 2018-05-05 RX ADMIN — IBUPROFEN 600 MG: 600 TABLET, FILM COATED ORAL at 18:00

## 2018-05-05 RX ADMIN — ACETAMINOPHEN 650 MG: 325 TABLET, FILM COATED ORAL at 05:12

## 2018-05-05 RX ADMIN — VANCOMYCIN HYDROCHLORIDE 1000 MG: 100 INJECTION, POWDER, LYOPHILIZED, FOR SOLUTION INTRAVENOUS at 13:30

## 2018-05-05 RX ADMIN — MORPHINE SULFATE 1 MG: 4 INJECTION INTRAVENOUS at 04:13

## 2018-05-05 RX ADMIN — MORPHINE SULFATE 1 MG: 4 INJECTION INTRAVENOUS at 00:38

## 2018-05-05 RX ADMIN — STANDARDIZED SENNA CONCENTRATE AND DOCUSATE SODIUM 2 TABLET: 8.6; 5 TABLET, FILM COATED ORAL at 09:17

## 2018-05-05 RX ADMIN — OXYCODONE HYDROCHLORIDE 10 MG: 10 TABLET ORAL at 06:39

## 2018-05-05 RX ADMIN — ACETAMINOPHEN 650 MG: 325 TABLET, FILM COATED ORAL at 23:59

## 2018-05-05 RX ADMIN — VANCOMYCIN HYDROCHLORIDE 1000 MG: 100 INJECTION, POWDER, LYOPHILIZED, FOR SOLUTION INTRAVENOUS at 20:26

## 2018-05-05 RX ADMIN — OXYCODONE HYDROCHLORIDE 10 MG: 10 TABLET ORAL at 18:16

## 2018-05-05 RX ADMIN — PIPERACILLIN AND TAZOBACTAM 4.5 G: 4; .5 INJECTION, POWDER, LYOPHILIZED, FOR SOLUTION INTRAVENOUS; PARENTERAL at 13:00

## 2018-05-05 ASSESSMENT — ENCOUNTER SYMPTOMS
WHEEZING: 0
SPEECH CHANGE: 0
BACK PAIN: 0
SORE THROAT: 0
COUGH: 1
DEPRESSION: 0
NECK PAIN: 0
DOUBLE VISION: 0
TINGLING: 0
SENSORY CHANGE: 0
PALPITATIONS: 0
POLYDIPSIA: 0
FOCAL WEAKNESS: 1
BACK PAIN: 1
WEAKNESS: 1
SINUS PAIN: 0
SPUTUM PRODUCTION: 0
STRIDOR: 0
BLURRED VISION: 0
WEAKNESS: 0
CONSTIPATION: 0
ORTHOPNEA: 0
BRUISES/BLEEDS EASILY: 0
SHORTNESS OF BREATH: 1
NAUSEA: 0
DIARRHEA: 0
FALLS: 0
PHOTOPHOBIA: 0
DIZZINESS: 0
EYE DISCHARGE: 0
ROS GI COMMENTS: BM 5/5
VOMITING: 0
ABDOMINAL PAIN: 0
EYE REDNESS: 0
FOCAL WEAKNESS: 0
BLOOD IN STOOL: 0
HEADACHES: 0
FEVER: 0
PND: 0
CHILLS: 0
MYALGIAS: 0
SHORTNESS OF BREATH: 0
HEMOPTYSIS: 0
NERVOUS/ANXIOUS: 0

## 2018-05-05 ASSESSMENT — PAIN SCALES - GENERAL
PAINLEVEL_OUTOF10: 6
PAINLEVEL_OUTOF10: 5
PAINLEVEL_OUTOF10: 4
PAINLEVEL_OUTOF10: 5
PAINLEVEL_OUTOF10: 5
PAINLEVEL_OUTOF10: 9
PAINLEVEL_OUTOF10: 6

## 2018-05-05 NOTE — NON-PROVIDER
Internal Medicine Medical Student Note  Note Author: Hernan Shields, Student    Name Maritza Swann     1995   Age/Sex 22 y.o. female   MRN [id number]   Code Status Full Code     After 5PM or if no immediate response to page, please call for cross-coverage  Attending/Team: Dr. Bhatia See Patient List for primary contact information  Call [phone] to page after hours   1st Call - Day Intern (R1):   Dr. Garber 2nd Call - Day Sr. Resident (R2/R3):   Dr. Gil         Reason for interval visit  (Principal Problem)   Empyema of left pleural space (HCC)    Interval Problem Daily Status Update  (24 hours)   Patient was transferred to medical floor with no complications. Overnight at around 4:10AM, patient jolted in her sleep and one of her chest tube stitches tore out from her chest tube insertion site. Overnight MD was notified and patient was given an early dose of dilaudid. Patient is not currently in pain as she recently received her scheduled morphine. She still reports of a cough but it has decreased since yesterday and has not been able to spit anything up. Nursing reports that there is a 1 inch skin tear that does not require suturing. Per surgery, the apical left chest tube was removed and the basilar left chest tube is still in place. ID will be consulted tomorrow about antibiotic regimen.      Review of Systems   Constitutional: Negative for chills and fever.   HENT: Negative for congestion, ear pain, hearing loss and sore throat.    Eyes: Negative for blurred vision and double vision.   Respiratory: Positive for cough. Negative for hemoptysis, sputum production, shortness of breath and wheezing.    Cardiovascular: Positive for chest pain. Negative for palpitations, orthopnea and leg swelling.   Gastrointestinal: Negative for abdominal pain, blood in stool, constipation and diarrhea.   Genitourinary: Negative for dysuria, hematuria and urgency.   Musculoskeletal: Positive for back  pain.   Neurological: Positive for focal weakness and weakness. Negative for dizziness and headaches.         Physical Exam       Vitals:    05/04/18 1610 05/04/18 2000 05/04/18 2148 05/05/18 0000   BP:  150/89  128/77   Pulse: 70 (!) 104 (!) 105 (!) 109   Resp: 16 16 16 17   Temp:  37.4 °C (99.3 °F)  36.3 °C (97.4 °F)   SpO2: 95% 96% 96% 95%   Weight:  51.9 kg (114 lb 6.7 oz)     Height:         Body mass index is 20.27 kg/m². Weight: 51.9 kg (114 lb 6.7 oz)  Oxygen Therapy:  Pulse Oximetry: 95 %, O2 (LPM): 1, O2 Delivery: Nasal Cannula    Physical Exam   Constitutional: She is well-developed, well-nourished, and in no distress. No distress.   HENT:   Head: Normocephalic and atraumatic.   Cardiovascular: Normal rate, regular rhythm, normal heart sounds and intact distal pulses.  Exam reveals no gallop and no friction rub.    No murmur heard.  Pulses 2+ in upper and lower extremities.   Pulmonary/Chest: Effort normal. No respiratory distress. She has no wheezes.   Crepitus noted around the chest tube insertion site, posterior shoulder and lateral left neck, decreased from yesterday. Right lung clear to auscultation. Back tenderness noted around the chest tube site.   Abdominal: She exhibits no distension. There is no tenderness.   Musculoskeletal: She exhibits no edema.   Skin: Skin is warm and dry. She is not diaphoretic. No erythema.         Labs  CBC  - Hb = 8.4  - Hct = 26.5; H/H low; consistent with DARIUS.  - Plt = 454; elevated, continues to trend downward (was 726 on admission).    CMP  - K = 3.5; low, most likely transient as other levels were WNL.  - Ca = 8.0; low, no albumin recorded to calculated corrected Ca.    Cultures of Tissues  - No growth observed after 48 hours of both aerobic and anaerobic cultures.      Imaging  CXR  1.  Hazy bilateral lower lobe infiltrates, stable.  2.  Soft tissue gas in the left chest wall.  - Interpretation: chest tubes in place with minimal movement. Lower lobe infiltrates  present, subcutaneous emphysema present in areas noted in physical exam.    Assessment/Plan     #Empyema  Patient had SOB for 2 days prior to admission and was worse when she was laying on her left side. Although she is able to speak in complete sentences, she takes deep breaths and looks mildly uncomfortable. Empyema was demonstrated by CXR in the ED and confirmed on the CT scan; however other etiologies like PE were considered especially since her D-dimer came back to 2687 (elevated). Her Wells score for PE was 1.5 which corresponds to a 1.3% risk of actually having a PE. CT did not demonstrate PE but demonstrated the appearance of loculated fluid. Patient was sent to surgery for thorascopy with decortication, tissue cultures were sent to the lab and results have thus far been unremarkable for any bacterial etiology. Surgery was able to remove the left apical chest tube on 05/05/2018 with no apparent issue.     Plan  - IV Zosyn 4.5gm TID.  - IV Vancomycin 1000mg in 500cc TID  - Oxycodone immediate release 10mg [id number] PRN  - Morphine 1mg IV [id number] PRN  - Ibuprofen 600mg every 6 hours PRN  - Consult ID on 05/06/2018 for current antibiotic regimen to determine if any changes are needed.  - Continue to monitor O2 saturation  - If O2 saturation < 90%, provide supplemental O2 as needed.        #Pneumonia  Patient presented with SOB and has a recent history of being treated for MRSA pneumonia with linezolid. She was complaining about left sided chest pain as well. Her CURB 65 score was 0 on presentation which corresponds to a 0.6% chance of mortality at 30 days. CT of the chest did demonstrate cavitation with parenchymal inflammation, consistent with her history of MRSA infection. Other etiologies like reactive airway disease have been considered as well. She is having productive cough but her vitals have thus far been WNL. CXR are showing atelectasis in the right lower lobe and are being monitored.     Plan  - IV  Zosyn and Vancomycin started  - Guaifenesin 10mg/5cc syrup [id number] PRN  - Blood cultures drawn x2, results pending.  - Urine culture pending  - CTM O2 saturation and vital signs.        #Thrombocytosis  Patient had elevated platelet levels at presentation. She continues to have thrombocytosis despite IV fluids over the night. She most likely has reactive thrombocytosis secondary to her recent MRSA infection and recent pleural effusion. If her thrombocytosis remains elevated, she may need to be referred to hematology as an outpatient.     Plan  - Continue to monitor platelet count.         #Anemia  Patient has been having low H/H since her admission to the hospital. Her Fe studies demonstrated that she has decreased Fe and Fe saturation which is most consistent DARIUS. ACD was also considered due to her previous MRSA infection about 1 month ago. She is complaining of weakness and fatigue but it is unclear if this is due to the surgery and/or due to the anemia.     Plan  - Fe Sucrose 200mg injection daily  - Monitor H/H to trend levels overtime.  - Anticipate that her H/H will increase with Fe supplementation.        #Constipation  Patient had 2 bowel movements over the night of 05/04/2018. She denies any abdominal pain and is able to pass gas (no obstipation). Constipation is most likely due to her opiate pain medication regimen.     Plan  - Miralax to induce bowel movement.  - Patient is also on bowel protocol.        #DVT prophylaxis  - Enoxaparin 30mg for DVT prophylaxis

## 2018-05-05 NOTE — CARE PLAN
Problem: Venous Thromboembolism (VTW)/Deep Vein Thrombosis (DVT) Prevention:  Goal: Patient will participate in Venous Thrombosis (VTE)/Deep Vein Thrombosis (DVT)Prevention Measures  Outcome: PROGRESSING AS EXPECTED  Pt agrees to leave SCDs in place while she sleeps    Problem: Pain Management  Goal: Pain level will decrease to patient's comfort goal  Outcome: PROGRESSING SLOWER THAN EXPECTED  Pt reports that she needs to be medicated frequently for pain or else she has severe breakthrough pain

## 2018-05-05 NOTE — PROGRESS NOTES
Received bedside shift report. Patient is AAOx4. No acute distress noted. Vital signs stable. Tele monitor in place. Patient assisted out of bed to the chair to eat dinner.  Patient denies pain or needs at this time.

## 2018-05-05 NOTE — PROGRESS NOTES
Transport arrived to take patient to new unit. [facility] RN, Satish notified. Patient stable for transport.

## 2018-05-05 NOTE — PROGRESS NOTES
Pt jolted in her sleep and tore one of the stitches out from her chest tube. Chest tube is still in place but pt reports 10/10 pain at the site. Hospitalist called and said to give her the morphine dose early.

## 2018-05-05 NOTE — PROGRESS NOTES
2 RN skin check complete. Assumed care of pt from tele. Pt is A&Ox4, call light within reach, bed lowered and locked, fall education reinforced. Pt arrived with new orders to water seal both chest tubes. Pt educated on the importance of keeping chest tube reservoirs upright.

## 2018-05-05 NOTE — CARE PLAN
Problem: Safety  Goal: Will remain free from injury  Outcome: PROGRESSING AS EXPECTED    Goal: Will remain free from falls  Outcome: PROGRESSING AS EXPECTED      Problem: Infection  Goal: Will remain free from infection  Outcome: PROGRESSING AS EXPECTED      Problem: Venous Thromboembolism (VTW)/Deep Vein Thrombosis (DVT) Prevention:  Goal: Patient will participate in Venous Thrombosis (VTE)/Deep Vein Thrombosis (DVT)Prevention Measures  Outcome: PROGRESSING AS EXPECTED      Problem: Bowel/Gastric:  Goal: Normal bowel function is maintained or improved  Outcome: PROGRESSING AS EXPECTED    Goal: Will not experience complications related to bowel motility  Outcome: PROGRESSING AS EXPECTED      Problem: Knowledge Deficit  Goal: Knowledge of disease process/condition, treatment plan, diagnostic tests, and medications will improve  Outcome: PROGRESSING AS EXPECTED    Goal: Knowledge of the prescribed therapeutic regimen will improve  Outcome: PROGRESSING AS EXPECTED      Problem: Discharge Barriers/Planning  Goal: Patient's continuum of care needs will be met  Outcome: PROGRESSING AS EXPECTED      Problem: Pain Management  Goal: Pain level will decrease to patient's comfort goal  Outcome: PROGRESSING AS EXPECTED      Problem: Respiratory:  Goal: Respiratory status will improve  Outcome: PROGRESSING AS EXPECTED      Problem: Mobility  Goal: Risk for activity intolerance will decrease  Outcome: PROGRESSING AS EXPECTED

## 2018-05-05 NOTE — PROGRESS NOTES
"  Trauma/Surgical Progress Note    Author: Iva Perez Date & Time created: 5/5/2018   11:19 AM     Interval Events:  CXR without pneumothorax  Minimal chest tube output, no air leak    - Left apical chest tube removed  - Continue left basilar chest tube to water seal  - CXR in AM  - Antibiotics per medicine    Review of Systems   Constitutional: Negative for chills and fever.   Respiratory: Negative for shortness of breath.    Gastrointestinal: Negative for nausea and vomiting.        BM 5/5   Genitourinary: Negative for dysuria.        Voiding   Skin: Negative for rash.   Neurological: Negative for dizziness and speech change.     Hemodynamics:  Blood pressure 121/78, pulse 63, temperature 36.6 °C (97.9 °F), resp. rate 15, height 1.6 m (5' 3\"), weight 51.9 kg (114 lb 6.7 oz), SpO2 97 %, not currently breastfeeding.     Respiratory:    Respiration: 15, Pulse Oximetry: 97 %, O2 Daily Delivery Respiratory : Room Air with O2 Available  Chest Tube Group 1 (A) Left 28-Tube Status / Drainage: Patent;Small, Chest Tube Group 2 (B) Left 32-Tube Status / Drainage: Draining;Patent, Chest Tube Group 1 (A) Left 28-Device: Suction 20 cm Water, Chest Tube Group 2 (B) Left 32-Device: Suction 20 cm Water  PEP/CPT Method: Positive Airway Pressure Device, Work Of Breathing / Effort: Mild  RUL Breath Sounds: Clear, RML Breath Sounds: Clear, RLL Breath Sounds: Clear;Diminished, CHINO Breath Sounds: Clear, LLL Breath Sounds: Diminished  Fluids:    Intake/Output Summary (Last 24 hours) at 05/05/18 1119  Last data filed at 05/05/18 0725   Gross per 24 hour   Intake             1160 ml   Output             2204 ml   Net            -1044 ml     Admit Weight: 46.8 kg (103 lb 2.8 oz)  Current Weight: 51.9 kg (114 lb 6.7 oz)    Physical Exam   Constitutional: She is oriented to person, place, and time. She appears well-developed. No distress.   Neck: Neck supple.   Cardiovascular: Normal rate.    Pulmonary/Chest: Effort normal. No respiratory " distress. She exhibits tenderness (left chest wall).   Left chest tubes to water seal, no air leaks   Musculoskeletal:   Ambulatory   Neurological: She is alert and oriented to person, place, and time.   Skin: Skin is warm and dry.   Nursing note and vitals reviewed.      Medical Decision Making/Problem List:    Active Hospital Problems    Diagnosis   • PNA (pneumonia) [J18.9]     Priority: High   • Empyema of left pleural space (HCC) [J86.9]     Priority: High     5/2 Thoracoscopy with decortication, 2 chest tubes placed.  5/4 Chest tubes to water seal.  5/5 CXR without pneumothorax.   - Chest tube # 1 250 ml total output / 40 ml output in 24 hours / no air leak / continue water seal.   - Chest tube #2 73 ml total output / 38 ml output in 24 hours / no air leak / removed.  Yimi Rivera MD. General surgery.     • Thrombocytosis (HCC) [D47.3]     Core Measures & Quality Metrics:  Labs reviewed, Medications reviewed and Radiology images reviewed  Haas catheter: No Haas      DVT Prophylaxis: Enoxaparin (Lovenox)  DVT prophylaxis - mechanical: SCDs  Ulcer prophylaxis: Not indicated    Assessed for rehab: Patient returned to prior level of function, rehabilitation not indicated at this time    Total Score: 0     ETOH Screening     Discussed patient condition with RN, Patient and general surgery. Dr. Rivera

## 2018-05-05 NOTE — PROGRESS NOTES
Patient has a medical bed available. Report called to [facility] nurse, Satish. Patient assisted in getting ready for transport. Awaiting transport to  the patient.

## 2018-05-05 NOTE — CARE PLAN
Problem: Infection  Goal: Will remain free from infection  Outcome: PROGRESSING AS EXPECTED  IV antibiotics administered per MD order. Labs and Vitals monitored regularly for signs of worsening infection.    Problem: Respiratory:  Goal: Respiratory status will improve  Outcome: PROGRESSING AS EXPECTED  Two left chest tubes in place to low continuous suction, draining minimal serosanguinous fluids. Patient maintaining saturations on room air. Incentive spirometry encouraged.

## 2018-05-05 NOTE — PROGRESS NOTES
Pt chest tube sites are clean,dry, and intact. Chest tube reservoirs show no signs of leakage or spill. Pt lung sounds are diminished on the left side and the lower right lobe. Pt bowel sounds are normoactive. Pt cardiac sounds have a clear S1S2 and last EKG shows normal sinus rhythm.

## 2018-05-05 NOTE — CARE PLAN
Problem: Hyperinflation:  Goal: Prevent or improve atelectasis  Outcome: PROGRESSING AS EXPECTED  Respiratory Therapy Update    Interdisciplinary Plan of Care-Goals (Indications)  Bronchodilator Indications: History / Diagnosis (05/02/18 1411)  Hyperinflation Protocol Indications: Atelectasis Documented by Chest X-Ray (05/04/18 2148)  Interdisciplinary Plan of Care-Outcomes   Bronchopulmonary Hygiene Outcome: Patient at Stable Baseline (05/04/18 2148)  Hyperinflation Protocol Goals/Outcome: Improvement in Repeat CXR (05/04/18 0823)    #PEP/CPT (Manual) Initial: Initial (05/04/18 1610)          Cough: Non Productive (05/04/18 2000)                        FiO2%: 21 % (05/04/18 1610)  O2 (LPM): 0 (05/04/18 2148)  O2 Daily Delivery Respiratory : Room Air with O2 Available (05/04/18 2148)    Breath Sounds  Pre/Post Intervention: Pre Intervention Assessment (05/04/18 2148)  RUL Breath Sounds: Clear (05/04/18 2148)  RML Breath Sounds: Clear (05/04/18 2148)  RLL Breath Sounds: Clear;Diminished;Crackles (05/04/18 2148)  CHINO Breath Sounds: Clear (05/04/18 2148)  LLL Breath Sounds: Diminished;Crackles (05/04/18 2148)    Therapy changed to     Events/Summary/Plan: pep and IS done  (05/04/18 2148)

## 2018-05-05 NOTE — PROGRESS NOTES
Family/friends at bedside. Pt lying on R side, avoiding lying on chest tube sites, pain meds given.

## 2018-05-05 NOTE — PROGRESS NOTES
Internal Medicine Interval Note  Note Author: Igancio Huber M.D.     Name Maritza Swann     1995   Age/Sex 22 y.o. female   MRN [id number]   Code Status Full Code       After 5PM or if no immediate response to page, please call for cross-coverage  Attending/Team: Dr. Bhatia / Otto  See Patient List for primary contact information  Call [phone] to page    1st Call - Day Intern (R1):   Dr. Huber 2nd Call - Day Sr. Resident (R2/R3):   Dr. Gil           Reason for interval visit  (Principal Problem)   Empyema of left pleural space (HCC)    Interval Problem Daily Status Update  (24 hours)   -Overnight, patient jolted in her sleep and tore one of the stitches out from her chest tube, chest tube was still in place and functioning, overall condition improving, less shortness of breath, still on Vank and Zosyn, cultures so far negative, still awaiting copy of medical records from [facility], surgery planning to DC 1 of the tubes today and get a recheck x-ray.  Will talk to ID tomorrow to discuss antibiotic selection.           Review of Systems   Constitutional: Negative for chills, fever and malaise/fatigue.   HENT: Negative for hearing loss and sinus pain.    Eyes: Negative for blurred vision, photophobia, discharge and redness.   Respiratory: Positive for cough and shortness of breath. Negative for hemoptysis, sputum production, wheezing and stridor.    Cardiovascular: Positive for chest pain. Negative for palpitations, orthopnea, leg swelling and PND.   Gastrointestinal: Negative for abdominal pain, constipation, diarrhea, nausea and vomiting.   Genitourinary: Negative.  Negative for dysuria, frequency, hematuria and urgency.   Musculoskeletal: Negative for back pain, falls, myalgias and neck pain.   Skin: Negative for itching and rash.   Neurological: Negative for dizziness, tingling, sensory change, speech change, focal weakness and weakness.    Endo/Heme/Allergies: Negative for polydipsia. Does not bruise/bleed easily.   Psychiatric/Behavioral: Negative for depression and suicidal ideas. The patient is not nervous/anxious.        Consultants/Specialty  Surgery, Dr. Rivera    Disposition  Inpatient treated for Empyema.     Quality Measures  Quality-Core Measures   Reviewed items::  Labs reviewed and Medications reviewed  Haas catheter::  No Haas  DVT prophylaxis pharmacological::  Enoxaparin (Lovenox)  Antibiotics:  Treating active infection/contamination beyond 24 hours perioperative coverage          Physical Exam       Vitals:    05/05/18 0000 05/05/18 0400 05/05/18 0725 05/05/18 0951   BP: 128/77 115/75 121/78    Pulse: (!) 109 81 63 63   Resp: 17 18 18 15   Temp: 36.3 °C (97.4 °F) 36.2 °C (97.1 °F) 36.6 °C (97.9 °F)    SpO2: 95%  97% 97%   Weight:       Height:         Body mass index is 20.27 kg/m². Weight: 51.9 kg (114 lb 6.7 oz)  Oxygen Therapy:  Pulse Oximetry: 97 %, O2 (LPM): 0.5, FiO2%: 21 %, O2 Delivery: Nasal Cannula    Physical Exam   Constitutional: She is oriented to person, place, and time. No distress.   HENT:   Head: Normocephalic and atraumatic.   Eyes: Pupils are equal, round, and reactive to light.   Neck: Normal range of motion. Neck supple.   Cardiovascular: Normal rate and regular rhythm.  Exam reveals no gallop and no friction rub.    No murmur heard.  Pulmonary/Chest: Effort normal. No respiratory distress. She has decreased breath sounds in the left lower field. She has no wheezes. She exhibits tenderness.   Left side subcutaneous emphysema around the chest tube insertion site   Abdominal: Soft. Bowel sounds are normal. She exhibits no distension. There is no tenderness.   Musculoskeletal: She exhibits no edema.   Neurological: She is alert and oriented to person, place, and time. No cranial nerve deficit.   Skin: No rash noted. She is not diaphoretic. No erythema. No pallor.   Psychiatric: Mood and affect normal.   Nursing  note and vitals reviewed.        Lab Data Review:         5/3/2018  7:23 PM    Recent Labs      05/03/18 0631 05/04/18 0157 05/05/18   0241   SODIUM  138  138  142   POTASSIUM  3.7  3.8  3.5*   CHLORIDE  102  105  109   CO2  26  26  25   BUN  6*  5*  5*   CREATININE  0.56  0.52  0.49*   CALCIUM  8.5  8.4*  8.0*       Recent Labs      05/03/18 0631 05/04/18 0157 05/05/18   0241   GLUCOSE  94  91  93       Recent Labs      05/04/18 0157 05/04/18   0825 05/05/18   0241   RBC  2.74*  2.90*  2.69*   HEMOGLOBIN  8.5*  9.1*  8.4*   HEMATOCRIT  26.6*  28.2*  26.5*   PLATELETCT  486*  489*  454*   IRON   --   18*   --    TOTIRONBC   --   255   --        Recent Labs      05/03/18 0631 05/04/18 0157 05/04/18 0825 05/05/18   0241   WBC  11.5*  11.1*  11.4*  9.8   NEUTSPOLYS  76.10*  67.70   --   64.20   LYMPHOCYTES  10.00*  16.50*   --   21.20*   MONOCYTES  11.90  12.70   --   9.80   EOSINOPHILS  1.00  2.20   --   3.50   BASOPHILS  0.60  0.50   --   0.50           Assessment/Plan     * Empyema of left pleural space (HCC)- (present on admission)   Assessment & Plan    - Surgery was consulted, Dr Rivera did thoracoscopy, decortication, drainage of pleural effusion with 2 chest tubes on 5/2.  - on Vanc and zosyn   - Currently draining serosanguineous discharge  - There is subcutaneous emphysema. Tubes functioning.  - tissue cultures pending, so far cultures are negative          PNA (pneumonia)- (present on admission)   Assessment & Plan    -Recent history of staph pneumonia post flu, treated with linezolid, initially better and then worse, presented with 2 days history of increasing left-sided chest pain and shortness of breath.  - CTA chest Bilateral patchy opacities with cavitation, left much greater than right, are likely related to multifocal pneumonia given history of MRSA pneumonia. More confluent opacity in the medial left upper lobe contains a loculated fluid consistent with necrosis or abscess  formation. Low-density left pleural effusion. Enhancing pleura suggest early empyema formation.   Plan:  - Blood cultures X2 NGTD, Pleural tissue Cx NGTD  - on Vanc and Zosyn day 4  - Surgery was consulted, Dr Rivera did thoracoscopy, decortication, drainage of pleural effusion with 2 chest tubes on 5/2.  - CT functioning, suction disconnected.   -Requested copy of medical records from [facility] ([place])           Thrombocytosis (HCC)- (present on admission)   Assessment & Plan    - Platelet count 726  - Possibly due to dehydration or reactive  - Monitor platelet count

## 2018-05-05 NOTE — PROGRESS NOTES
"Pharmacy Kinetics 22 y.o. female on vancomycin day # 5 2018    Currently Dose: Vancomycin 700 mg iv q8hr (0100 0900 1700)     Indication for Treatment: empyema/pneumonia     Pertinent history per medical record: Admitted on 2018 for suspected pneumonia. Patient with recent bout of MRSA pneumonia at [facility] ([place], [place]) and completed course of linezolid. Imaging on this admission suggestive of empyema. Surgery consulted. Admitted to telemetry.     Other antibiotics: piperacillin/tazobactam 3.375 gm iv q8h     Allergies: Patient has no known allergies.      List concerns for accumulation of vancomycin: minimal concerns noted     Pertinent cultures to date:   18:PBCx2:NGTD  18:UR:NGTD  18:Empyema Lt Lung,TISS:NGTD    Recent Labs      18   0631  18   0157  18   0825  18   0241   WBC  11.5*  11.1*  11.4*  9.8   NEUTSPOLYS  76.10*  67.70   --   64.20     Recent Labs      18   0631  18   0157  18   0241   BUN  6*  5*  5*   CREATININE  0.56  0.52  0.49*     Recent Labs      18   0631   VANCOTROUGH  10.6     Intake/Output Summary (Last 24 hours) at 18 0813  Last data filed at 18 0400   Gross per 24 hour   Intake             1690 ml   Output             2204 ml   Net             -514 ml      Blood pressure 115/75, pulse 81, temperature 36.2 °C (97.1 °F), resp. rate 18, height 1.6 m (5' 3\"), weight 51.9 kg (114 lb 6.7 oz), SpO2 95 %, not currently breastfeeding. Temp (24hrs), Av.6 °C (97.9 °F), Min:36.2 °C (97.1 °F), Max:37.4 °C (99.3 °F)      A/P        1. Vancomycin dose change: No change   2. Next vancomycin level: 18@0830  3. Goal trough: 16-20 mcg/mL   4. Comments: S/p decortication, drainage of pleural effusion with 2 chest tubes on . WBC wnl, Cx NGTD , afebrile over interval. Renal indices appear stable , level ordered.      Kishor Sutherland PharmD BCPS     Currently Dose: Vancomycin 700 mg iv q8hr (2477 1790 " 2030)     Reordered vancomycin level.    Kishor Sutherland PharmD BCPS

## 2018-05-06 ENCOUNTER — APPOINTMENT (OUTPATIENT)
Dept: RADIOLOGY | Facility: MEDICAL CENTER | Age: 23
DRG: 163 | End: 2018-05-06
Attending: NURSE PRACTITIONER
Payer: MEDICAID

## 2018-05-06 LAB
ANION GAP SERPL CALC-SCNC: 7 MMOL/L (ref 0–11.9)
BACTERIA BLD CULT: NORMAL
BACTERIA BLD CULT: NORMAL
BASOPHILS # BLD AUTO: 0.4 % (ref 0–1.8)
BASOPHILS # BLD: 0.04 K/UL (ref 0–0.12)
BUN SERPL-MCNC: 6 MG/DL (ref 8–22)
CALCIUM SERPL-MCNC: 8.1 MG/DL (ref 8.5–10.5)
CHLORIDE SERPL-SCNC: 106 MMOL/L (ref 96–112)
CO2 SERPL-SCNC: 25 MMOL/L (ref 20–33)
CREAT SERPL-MCNC: 0.53 MG/DL (ref 0.5–1.4)
EOSINOPHIL # BLD AUTO: 0.33 K/UL (ref 0–0.51)
EOSINOPHIL NFR BLD: 3.7 % (ref 0–6.9)
ERYTHROCYTE [DISTWIDTH] IN BLOOD BY AUTOMATED COUNT: 46.1 FL (ref 35.9–50)
FERRITIN SERPL-MCNC: 236.6 NG/ML (ref 10–291)
GLUCOSE SERPL-MCNC: 83 MG/DL (ref 65–99)
HCT VFR BLD AUTO: 27.4 % (ref 37–47)
HGB BLD-MCNC: 8.8 G/DL (ref 12–16)
IMM GRANULOCYTES # BLD AUTO: 0.04 K/UL (ref 0–0.11)
IMM GRANULOCYTES NFR BLD AUTO: 0.4 % (ref 0–0.9)
LYMPHOCYTES # BLD AUTO: 1.94 K/UL (ref 1–4.8)
LYMPHOCYTES NFR BLD: 21.5 % (ref 22–41)
MCH RBC QN AUTO: 31.4 PG (ref 27–33)
MCHC RBC AUTO-ENTMCNC: 32.1 G/DL (ref 33.6–35)
MCV RBC AUTO: 97.9 FL (ref 81.4–97.8)
MONOCYTES # BLD AUTO: 0.72 K/UL (ref 0–0.85)
MONOCYTES NFR BLD AUTO: 8 % (ref 0–13.4)
NEUTROPHILS # BLD AUTO: 5.94 K/UL (ref 2–7.15)
NEUTROPHILS NFR BLD: 66 % (ref 44–72)
NRBC # BLD AUTO: 0 K/UL
NRBC BLD-RTO: 0 /100 WBC
PLATELET # BLD AUTO: 456 K/UL (ref 164–446)
PMV BLD AUTO: 9.3 FL (ref 9–12.9)
POTASSIUM SERPL-SCNC: 3.7 MMOL/L (ref 3.6–5.5)
RBC # BLD AUTO: 2.8 M/UL (ref 4.2–5.4)
SIGNIFICANT IND 70042: NORMAL
SIGNIFICANT IND 70042: NORMAL
SITE SITE: NORMAL
SITE SITE: NORMAL
SODIUM SERPL-SCNC: 138 MMOL/L (ref 135–145)
SOURCE SOURCE: NORMAL
SOURCE SOURCE: NORMAL
WBC # BLD AUTO: 9 K/UL (ref 4.8–10.8)

## 2018-05-06 PROCEDURE — 82728 ASSAY OF FERRITIN: CPT

## 2018-05-06 PROCEDURE — 700102 HCHG RX REV CODE 250 W/ 637 OVERRIDE(OP): Performed by: NURSE PRACTITIONER

## 2018-05-06 PROCEDURE — 80048 BASIC METABOLIC PNL TOTAL CA: CPT

## 2018-05-06 PROCEDURE — 700111 HCHG RX REV CODE 636 W/ 250 OVERRIDE (IP): Performed by: INTERNAL MEDICINE

## 2018-05-06 PROCEDURE — 94668 MNPJ CHEST WALL SBSQ: CPT

## 2018-05-06 PROCEDURE — 85025 COMPLETE CBC W/AUTO DIFF WBC: CPT

## 2018-05-06 PROCEDURE — 770006 HCHG ROOM/CARE - MED/SURG/GYN SEMI*

## 2018-05-06 PROCEDURE — 700105 HCHG RX REV CODE 258: Performed by: INTERNAL MEDICINE

## 2018-05-06 PROCEDURE — 71045 X-RAY EXAM CHEST 1 VIEW: CPT

## 2018-05-06 PROCEDURE — A9270 NON-COVERED ITEM OR SERVICE: HCPCS | Performed by: NURSE PRACTITIONER

## 2018-05-06 PROCEDURE — 99232 SBSQ HOSP IP/OBS MODERATE 35: CPT | Performed by: INTERNAL MEDICINE

## 2018-05-06 PROCEDURE — 94760 N-INVAS EAR/PLS OXIMETRY 1: CPT

## 2018-05-06 PROCEDURE — 36415 COLL VENOUS BLD VENIPUNCTURE: CPT

## 2018-05-06 RX ADMIN — OXYCODONE HYDROCHLORIDE 10 MG: 10 TABLET ORAL at 17:51

## 2018-05-06 RX ADMIN — OXYCODONE HYDROCHLORIDE 5 MG: 5 TABLET ORAL at 05:37

## 2018-05-06 RX ADMIN — ACETAMINOPHEN 650 MG: 325 TABLET, FILM COATED ORAL at 17:51

## 2018-05-06 RX ADMIN — IBUPROFEN 600 MG: 600 TABLET, FILM COATED ORAL at 13:00

## 2018-05-06 RX ADMIN — IBUPROFEN 600 MG: 600 TABLET, FILM COATED ORAL at 17:51

## 2018-05-06 RX ADMIN — OXYCODONE HYDROCHLORIDE 10 MG: 10 TABLET ORAL at 12:45

## 2018-05-06 RX ADMIN — VANCOMYCIN HYDROCHLORIDE 1000 MG: 100 INJECTION, POWDER, LYOPHILIZED, FOR SOLUTION INTRAVENOUS at 05:39

## 2018-05-06 RX ADMIN — ACETAMINOPHEN 650 MG: 325 TABLET, FILM COATED ORAL at 12:58

## 2018-05-06 RX ADMIN — OXYCODONE HYDROCHLORIDE 10 MG: 10 TABLET ORAL at 21:59

## 2018-05-06 RX ADMIN — OXYCODONE HYDROCHLORIDE 10 MG: 10 TABLET ORAL at 08:29

## 2018-05-06 RX ADMIN — PIPERACILLIN AND TAZOBACTAM 4.5 G: 4; .5 INJECTION, POWDER, LYOPHILIZED, FOR SOLUTION INTRAVENOUS; PARENTERAL at 05:39

## 2018-05-06 RX ADMIN — IBUPROFEN 600 MG: 600 TABLET, FILM COATED ORAL at 09:00

## 2018-05-06 RX ADMIN — OXYCODONE HYDROCHLORIDE 5 MG: 5 TABLET ORAL at 01:04

## 2018-05-06 RX ADMIN — VANCOMYCIN HYDROCHLORIDE 1000 MG: 100 INJECTION, POWDER, LYOPHILIZED, FOR SOLUTION INTRAVENOUS at 14:00

## 2018-05-06 RX ADMIN — ACETAMINOPHEN 650 MG: 325 TABLET, FILM COATED ORAL at 05:37

## 2018-05-06 RX ADMIN — VANCOMYCIN HYDROCHLORIDE 1000 MG: 100 INJECTION, POWDER, LYOPHILIZED, FOR SOLUTION INTRAVENOUS at 21:59

## 2018-05-06 ASSESSMENT — ENCOUNTER SYMPTOMS
SHORTNESS OF BREATH: 1
WHEEZING: 0
ABDOMINAL PAIN: 0
SENSORY CHANGE: 0
PHOTOPHOBIA: 0
FEVER: 0
WEAKNESS: 0
MYALGIAS: 0
NERVOUS/ANXIOUS: 0
DEPRESSION: 0
CONSTIPATION: 0
NECK PAIN: 0
BACK PAIN: 0
SPUTUM PRODUCTION: 0
DIZZINESS: 0
EYE REDNESS: 0
NAUSEA: 0
SPEECH CHANGE: 0
PND: 0
TINGLING: 0
POLYDIPSIA: 0
ROS GI COMMENTS: BM 5/5
FOCAL WEAKNESS: 0
STRIDOR: 0
FALLS: 0
DIARRHEA: 0
PALPITATIONS: 0
COUGH: 1
MYALGIAS: 1
EYE DISCHARGE: 0
SINUS PAIN: 0
BLURRED VISION: 0
SHORTNESS OF BREATH: 0
ORTHOPNEA: 0
HEMOPTYSIS: 0
VOMITING: 0
BRUISES/BLEEDS EASILY: 0
CHILLS: 0

## 2018-05-06 ASSESSMENT — PAIN SCALES - GENERAL
PAINLEVEL_OUTOF10: 8
PAINLEVEL_OUTOF10: 7
PAINLEVEL_OUTOF10: 4
PAINLEVEL_OUTOF10: 5
PAINLEVEL_OUTOF10: 7
PAINLEVEL_OUTOF10: 4

## 2018-05-06 NOTE — PROGRESS NOTES
"Pharmacy Kinetics 22 y.o. female on vancomycin day # 6   2018    Currently Dose: Vancomycin 700 mg iv q8hr (9645 2573 3296)     Indication for Treatment: empyema/pneumonia     Pertinent history per medical record: Admitted on 2018 for suspected pneumonia. Patient with recent bout of MRSA pneumonia at [facility] ([place], [place]) and completed course of linezolid. Imaging on this admission suggestive of empyema. Surgery consulted. Admitted to telemetry.     Other antibiotics: piperacillin/tazobactam 3.375 gm iv q8h     Allergies: Patient has no known allergies.      List concerns for accumulation of vancomycin: minimal concerns noted     Pertinent cultures to date:   18:PBCx2:NGTD  18:UR:NGTD  18:Nares: Negative MRSA  18:Empyema Lt Lung,TISS:NGTD    Recent Labs      18   0157  18   0825  18   0241  18   0200   WBC  11.1*  11.4*  9.8  9.0   NEUTSPOLYS  67.70   --   64.20  66.00     Recent Labs      18   0157  18   0241  18   0200   BUN  5*  5*  6*   CREATININE  0.52  0.49*  0.53     Recent Labs      18   0819  18   1251   VANCOTROUGH  21.1*  17.7     Intake/Output Summary (Last 24 hours) at 18 0846  Last data filed at 18 0537   Gross per 24 hour   Intake             2460 ml   Output             2620 ml   Net             -160 ml      Blood pressure 132/80, pulse 84, temperature 36.2 °C (97.1 °F), resp. rate 18, height 1.6 m (5' 3\"), weight 51.9 kg (114 lb 6.7 oz), SpO2 98 %, not currently breastfeeding. Temp (24hrs), Av.5 °C (97.7 °F), Min:36.1 °C (97 °F), Max:37 °C (98.6 °F)      A/P        1. Vancomycin dose change: No change   2. Next vancomycin level: ~2 days  3. Goal trough: 16-20 mcg/mL   4. Comments: S/p decortication, drainage of pleural effusion with 2 chest tubes on . WBC wnl, Cx NGTD , afebrile>72hr. Renal indices appear stable , last level @ goal.   · Consider abx de escalation when clinically " warranted.      Kishor SuarezD BCPS

## 2018-05-06 NOTE — PROGRESS NOTES
Bedside shift report and assessment completed.   A&Ox4.   Patient resting comfortably at this time.   Patient tolerating diet with no signs of N/V.  Left side chest tube x2 patent and draining. Dressing clean dry and intact.   Patient complaining with dizziness when ambulating.  + void, + flatus   All needs met at this time.   Personal items and call light within reach. SRx2.   Hourly rounding in place.

## 2018-05-06 NOTE — CONSULTS
DATE OF SERVICE:  05/06/2018    REQUESTING PHYSICIAN:  Dr. Belinda Bhatia.    IDENTIFICATION:  This is a 22-year-old female seen for antibiotic advice for   complicated  MRSA pneumonia.    HISTORY OF PRESENT ILLNESS:  This is a pleasant 22-year-old female who has   really no significant medical history, does not smoke, and approximately 4   weeks ago, she went to urgent care with myalgias and cough.  She was diagnosed   with influenza.  She had not had a flu shot this year.  The patient was given   Tamiflu; however, despite this over the next few days, she became  increasingly   worse and she was then admitted to [facility].At that time, she was diagnosed with post-influenza MRSA pneumonia.  She believes she was treated with IV vancomycin in the hospital and then switched   to oral linezolid at discharge.  She says she was not feeling any better by the time of her   discharge, but she went home and took an additional 9 days of oral   linezolid.  She then presented last week to her primary care physician who   thought she was not looking well and she was admitted to [facility].  She   was found to have a significant empyema and lung abscesses, so she underwent   Consultation by Dr. Yimi Rivera, and on May 2, he performed a   thoracoscopy with decortication.  He stated the empyema tissue was removed and   sent for culture.  The lungs were freed circumferentially and were reexpanded   and chest tubes were placed.  The patient was started on broad-spectrum   antibiotics.  Culture results are now final.  She has made some improvement, still   has a chest tube, one apparently one came out, and she has some cough, but except   for the pain, she feels like she is doing better.  The patient has no history   of any lung problems such as asthma.  She is not a smoker.  No history of   diabetes or any other kind of underlying chronic conditions.    ALLERGIES:  No known antibiotic allergies.    MEDICATIONS:   Here in the hospital include IV vancomycin on an every 8 hour   schedule as well as IV piperacillin and tazobactam and pain medicines.    PAST MEDICAL HISTORY:  History of endometriosis and ovarian cyst.    PAST SURGICAL HISTORY:  She has had plastic surgery and dental extractions.    SOCIAL HISTORY:  She is single.  She has a 2-year-old child staying with her mother  and she works   managing a coffee house.    HABITS:  Completely negative.    FAMILY HISTORY:  There is a history of hypertension and asthma in her family.    REVIEW OF SYSTEMS:  Really just remarkable for her pain from the chest tube,   slight cough.  No fever, no chills, no other lung complaints.  No history of   heart disease, GI problems, or endocrine problems.    PHYSICAL EXAMINATION:  GENERAL:  She is a pleasant, afebrile female who is lying on her side because   she has chest tubes in.  VITAL SIGNS:  Her vitals are completely negative.  She is not wearing oxygen.  NECK:  Her neck is supple.  LUNGS:  Her lungs are difficult to auscultate because she has subcutaneous   air.  There is no rhonchi when she coughs.  She has a chest tube present.  CARDIAC:  Reveals no murmur.  ABDOMEN:  No liver or spleen enlargement.  EXTREMITIES:  Show peripheral pulses are good with IV lines and she has no   peripheral edema.  PSYCHIATRIC:  Normal mood and affect.    LABORATORY DATA:  Her white count was 10.7 on admission, is now down to 9.8.    She is slightly anemic.  Her chemistry panel shows normal renal function.  She   had a vancomycin trough yesterday at 17.7.  Microbiology results show her   empyema fluid, tissue from surgery are negative, few white cells are seen on   Gram stain, but cultures are negative as were anaerobic cultures and her nares   screen for MRSA was  also negative. Her CAT scan on admission here I reviewed   which showed she had bilateral patchy opacities with multiple cavitations the   left much greater than the right, consistent with  multifocal pneumonia   Secondary toMRSA.  She also has  a left pleural effusion with an enhancing pleura suggesting   early empyema formation.  She now has a chest x-ray done after her procedure   from this morning and it shows hazy bilateral lower lobe infiltrates are   stable and a trace pleural effusion and soft tissue gas in the left chest   wall.    ASSESSMENT:  A 22-year-old female, otherwise healthy, who developed influenza   complicated by a community-acquired MRSA infection. Despite IV and oral   Antibiotics,   she has now progressed to developing empyema and cavitary   complications of her infection.  Her negative cultures tells me that this was a   partially treated infection and there is no evidence that there is any other   superimposed bacteria on top of this.    RECOMMENDATIONS:  My recommendations are  1.  Continue vancomycin.  2.  Stop the piperacillin and tazobactam.  3.  I will attempt to obtain [facility]'s MRSA culture results with their susceptibility   reports help guide further therapy.  Even with the decortication, the presence   of the lung abscesses means she  will need to have an extended course of   therapy, approximately 4-6 weeks of either IV or oral antibiotics based on   what is susceptible for treatment.    Thank you very much for allowing me to consult on this patient.  We will   continue to follow with you.       ____________________________________     MD AMINTA PANIAGUA / NILO    DD:  05/06/2018 09:25:44  DT:  05/06/2018 10:27:59    D#:  [id number]  Job#:  [id number]

## 2018-05-06 NOTE — PROGRESS NOTES
"  Trauma/Surgical Progress Note    Author: Iva Perez Date & Time created: 5/6/2018   10:14 AM     Interval Events:  POD 4 left thoracoscopy with decortication  CXR without pneumothorax  Scant chest tube output, no air leak    - Left basilar chest tube removed  - May shower with Tegaderm dressing, change prn  - CXR in AM  - Disposition per medicine    Review of Systems   Constitutional: Negative for chills and fever.   Respiratory: Negative for shortness of breath.    Gastrointestinal: Negative for abdominal pain, nausea and vomiting.        BM 5/5   Genitourinary: Negative for dysuria.        Voiding   Musculoskeletal: Positive for myalgias (mild to left chest wall).   Skin: Negative for rash.   Neurological: Negative for dizziness and speech change.     Hemodynamics:  Blood pressure 120/69, pulse 98, temperature 36.6 °C (97.8 °F), resp. rate 13, height 1.6 m (5' 3\"), weight 51.9 kg (114 lb 6.7 oz), SpO2 96 %, not currently breastfeeding.     Respiratory:    Respiration: 13, Pulse Oximetry: 96 %, O2 Daily Delivery Respiratory : Room Air with O2 Available  Chest Tube Group 1 (A) Left 28-Tube Status / Drainage: Patent;Small, Chest Tube Group 1 (A) Left 28-Device: Water Seal  PEP/CPT Method: Positive Airway Pressure Device, Work Of Breathing / Effort: Mild  RUL Breath Sounds: Clear, RML Breath Sounds: Clear, RLL Breath Sounds: Clear;Diminished, CHINO Breath Sounds: Crackles;Diminished, LLL Breath Sounds: Crackles;Diminished  Fluids:    Intake/Output Summary (Last 24 hours) at 05/06/18 1014  Last data filed at 05/06/18 0537   Gross per 24 hour   Intake             2460 ml   Output             2620 ml   Net             -160 ml     Admit Weight: 46.8 kg (103 lb 2.8 oz)  Current      Physical Exam   Constitutional: She is oriented to person, place, and time. She appears well-developed. No distress.   Neck: Neck supple.   Cardiovascular: Normal rate.    Pulmonary/Chest: Effort normal. No respiratory distress. She exhibits " tenderness (left chest wall).   Left chest tube to water seal, no air leaks   Musculoskeletal:   Ambulatory   Neurological: She is alert and oriented to person, place, and time.   Skin: Skin is warm and dry.   Nursing note and vitals reviewed.      Medical Decision Making/Problem List:    Active Hospital Problems    Diagnosis   • PNA (pneumonia) [J18.9]     Priority: High   • Empyema of left pleural space (HCC) [J86.9]     Priority: High     5/2 Thoracoscopy with decortication, 2 chest tubes placed.  5/4 Chest tubes to water seal.  5/5 Apical chest tube removed.  5/6 Basilar chest tube removed.  Yimi Rivera MD. General surgery.     • Thrombocytosis (HCC) [D47.3]     Core Measures & Quality Metrics:  Labs reviewed, Medications reviewed and Radiology images reviewed  Haas catheter: No Haas      DVT Prophylaxis: Enoxaparin (Lovenox)  DVT prophylaxis - mechanical: SCDs  Ulcer prophylaxis: Not indicated    Assessed for rehab: Patient returned to prior level of function, rehabilitation not indicated at this time    SHENG Score     Discussed patient condition with RN, Patient and general surgery. Dr. Rivera

## 2018-05-06 NOTE — PROGRESS NOTES
Assumed care of patient from day RN. Pt awake, resting in bed with friend at bedside. During assessment this RN found that CT #1 had become disconnected from the drainage system. Reconnected chest tube to drainage system and updated [facility] on call resident Dr. Sun, who ordered STAT CXR. Results were unchanged from CXR earlier in the day. Pt. had no SOB, SPO2 97% on room air.     VSS A&Ox 4.  MEWS Score: 0. Moves all extremities, denies numbness or tingling. Skin assessed over bony prominences and under medical devices. Voiding, hypoactive BS, + flatus, LBM 5/5. Regular vegitarian diet, denies nausea/ vomiting. Drain(s): left CT #1 to water seal, left CT #2 removed earlier in the day, dressing CDI, subcutaneous emphysema present. Patient reports 5 out of 10 pain in left rib cage, declines intervention. Pt. is SBA assist. Avendaño Sanchez Fall Risk Score: Low Risk. Fall prevention education reinforced with pt. Pt is refusing treaded slipper socks, IV pole is on the same side as the bathroom, lower bedrails are down. Pt calls appropriatly. Discussed POC, all questions answered at this time. Bed is locked and in the lowest position, call light within reach, Q 2 hour rounding in place.

## 2018-05-06 NOTE — CARE PLAN
Problem: Knowledge Deficit  Goal: Knowledge of disease process/condition, treatment plan, diagnostic tests, and medications will improve  Outcome: PROGRESSING AS EXPECTED  Discussed POC at start of shift, pt. verbalized understanding.     Problem: Pain Management  Goal: Pain level will decrease to patient's comfort goal  Outcome: PROGRESSING AS EXPECTED  Consistent pain scale used.

## 2018-05-06 NOTE — PROGRESS NOTES
Full consult note dictated  Assess: complicated, partially treated  MRSA pna now s/p decortication   Surgical Cultures negative so no need for additional broad coverage here     REC: stop pip/tazobactam            Continue vancomycin            Will need extended outpatient course of either iv or oral antibiotics. Need [facility] micro results              To guide antibiotic therapy choice . If you are not able to obtain, I can  call their Micro lab tomorrow morning              Thank you for consult !

## 2018-05-06 NOTE — PROGRESS NOTES
Bedside shift report and assessment completed.   A&Ox4.   POC discussed with patient.  Patient resting comfortably at this time.   Patient tolerating diet..  Left side chest tube x1 to water seal patent and draining. Dressing clean dry and intact. Subq emphysema noted at this time.  + void, + flatus   Last BM 5/5/18  All needs met at this time.   Personal items and call light within reach. SRx2.   Hourly rounding in place.

## 2018-05-06 NOTE — PROGRESS NOTES
Internal Medicine Interval Note  Note Author: Ignacio Huber M.D.     Name Maritza Swann     1995   Age/Sex 22 y.o. female   MRN [id number]   Code Status Full Code       After 5PM or if no immediate response to page, please call for cross-coverage  Attending/Team: Dr. Bhatia / [place]  See Patient List for primary contact information  Call [phone] to page    1st Call - Day Intern (R1):   Dr. Huber 2nd Call - Day Sr. Resident (R2/R3):   Dr. Gil           Reason for interval visit  (Principal Problem)   Empyema of left pleural space (HCC)    Interval Problem Daily Status Update  (24 hours)   -Overnight, chest tube was found disconnected form system by RN, [facility] paged, CXR no change, tube reconnected, patient condition is stable, cultures still negative, she was seen by ID today, Zosyn stopped, will continue Vanc, Dr. Leger will check the C/S form the [facility] lab. Chest drained 20 cc over the last 24 hours.                 Review of Systems   Constitutional: Negative for chills, fever and malaise/fatigue.   HENT: Negative for hearing loss and sinus pain.    Eyes: Negative for blurred vision, photophobia, discharge and redness.   Respiratory: Positive for cough and shortness of breath. Negative for hemoptysis, sputum production, wheezing and stridor.    Cardiovascular: Positive for chest pain. Negative for palpitations, orthopnea, leg swelling and PND.   Gastrointestinal: Negative for abdominal pain, constipation, diarrhea, nausea and vomiting.   Genitourinary: Negative.  Negative for dysuria, frequency, hematuria and urgency.   Musculoskeletal: Negative for back pain, falls, myalgias and neck pain.   Skin: Negative for itching and rash.   Neurological: Negative for dizziness, tingling, sensory change, speech change, focal weakness and weakness.   Endo/Heme/Allergies: Negative for polydipsia. Does not bruise/bleed easily.   Psychiatric/Behavioral: Negative for depression and  suicidal ideas. The patient is not nervous/anxious.        Consultants/Specialty  Surgery, Dr. Rivera  Infectious disease, Dr. Leger    Disposition  Inpatient treated for Empyema, anticipating discharge home with Abx after CT removal.      Quality Measures  Quality-Core Measures   Reviewed items::  Labs reviewed and Medications reviewed  Haas catheter::  No Haas  DVT prophylaxis pharmacological::  Enoxaparin (Lovenox)  Antibiotics:  Treating active infection/contamination beyond 24 hours perioperative coverage          Physical Exam       Vitals:    05/05/18 2305 05/06/18 0325 05/06/18 0725 05/06/18 0847   BP: 126/76 132/80 120/69    Pulse: 83 84 75 98   Resp: 17 18 18 13   Temp: 36.4 °C (97.5 °F) 36.2 °C (97.1 °F) 36.6 °C (97.8 °F)    SpO2: 97% 98% 96% 96%   Weight:       Height:         Body mass index is 20.27 kg/m².    Oxygen Therapy:  Pulse Oximetry: 96 %, O2 (LPM): 0, FiO2%: 21 %, O2 Delivery: None (Room Air)    Physical Exam   Constitutional: She is oriented to person, place, and time. No distress.   HENT:   Head: Normocephalic and atraumatic.   Eyes: Pupils are equal, round, and reactive to light.   Neck: Normal range of motion. Neck supple.   Cardiovascular: Normal rate and regular rhythm.  Exam reveals no gallop and no friction rub.    No murmur heard.  Pulmonary/Chest: Effort normal. No respiratory distress. She has no wheezes. She exhibits tenderness.   Left side subcutaneous emphysema around the chest tube insertion site   Abdominal: Soft. Bowel sounds are normal. She exhibits no distension. There is no tenderness.   Musculoskeletal: She exhibits no edema.   Neurological: She is alert and oriented to person, place, and time. No cranial nerve deficit.   Skin: No rash noted. She is not diaphoretic. No erythema. No pallor.   Psychiatric: Mood and affect normal.   Nursing note and vitals reviewed.        Lab Data Review:         5/3/2018  7:23 PM    Recent Labs      05/04/18   0157  05/05/18   0241   05/06/18   0200   SODIUM  138  142  138   POTASSIUM  3.8  3.5*  3.7   CHLORIDE  105  109  106   CO2  26  25  25   BUN  5*  5*  6*   CREATININE  0.52  0.49*  0.53   CALCIUM  8.4*  8.0*  8.1*       Recent Labs      05/04/18   0157  05/05/18   0241  05/06/18   0200   GLUCOSE  91  93  83       Recent Labs      05/04/18   0825  05/05/18   0241 05/06/18   0200   RBC  2.90*  2.69*  2.80*   HEMOGLOBIN  9.1*  8.4*  8.8*   HEMATOCRIT  28.2*  26.5*  27.4*   PLATELETCT  489*  454*  456*   IRON  18*   --    --    FERRITIN   --    --   236.6   TOTIRONBC  255   --    --        Recent Labs      05/04/18   0157 05/04/18   0825 05/05/18   0241 05/06/18   0200   WBC  11.1*  11.4*  9.8  9.0   NEUTSPOLYS  67.70   --   64.20  66.00   LYMPHOCYTES  16.50*   --   21.20*  21.50*   MONOCYTES  12.70   --   9.80  8.00   EOSINOPHILS  2.20   --   3.50  3.70   BASOPHILS  0.50   --   0.50  0.40           Assessment/Plan     * Empyema of left pleural space (HCC)- (present on admission)   Assessment & Plan    - Surgery was consulted, Dr Rivera did thoracoscopy, decortication, drainage of pleural effusion with 2 chest tubes on 5/2.  - on Vanc and zosyn   - Currently draining serosanguineous discharge  - There is subcutaneous emphysema. Tube functioning.  - tissue cultures pending, so far cultures are negative  - First tube DCed in 5/5          PNA (pneumonia)- (present on admission)   Assessment & Plan    -Recent history of staph pneumonia post flu, treated with linezolid, initially better and then worse, presented with 2 days history of increasing left-sided chest pain and shortness of breath.  - CTA chest Bilateral patchy opacities with cavitation, left much greater than right, are likely related to multifocal pneumonia given history of MRSA pneumonia. More confluent opacity in the medial left upper lobe contains a loculated fluid consistent with necrosis or abscess formation. Low-density left pleural effusion. Enhancing pleura suggest early  empyema formation.   Plan:  - Blood cultures X2 NGTD, Pleural tissue Cx NGTD  - on Vanc. Zosyn DCed 5/6/2018  - Surgery was consulted, Dr Rivera did thoracoscopy, decortication, drainage of pleural effusion with 2 chest tubes on 5/2.  - CT functioning, suction disconnected.   -Requested copy of medical records from [facility]           Thrombocytosis (HCC)- (present on admission)   Assessment & Plan    - Platelet count 726  - Possibly due to dehydration or reactive  - Monitor platelet count

## 2018-05-07 ENCOUNTER — APPOINTMENT (OUTPATIENT)
Dept: RADIOLOGY | Facility: MEDICAL CENTER | Age: 23
DRG: 163 | End: 2018-05-07
Attending: NURSE PRACTITIONER
Payer: MEDICAID

## 2018-05-07 PROBLEM — J18.9 PNA (PNEUMONIA): Status: RESOLVED | Noted: 2018-05-01 | Resolved: 2018-05-07

## 2018-05-07 LAB
BACTERIA SPEC ANAEROBE CULT: NORMAL
SIGNIFICANT IND 70042: NORMAL
SITE SITE: NORMAL
SOURCE SOURCE: NORMAL

## 2018-05-07 PROCEDURE — 700111 HCHG RX REV CODE 636 W/ 250 OVERRIDE (IP): Performed by: INTERNAL MEDICINE

## 2018-05-07 PROCEDURE — 99233 SBSQ HOSP IP/OBS HIGH 50: CPT | Performed by: HOSPITALIST

## 2018-05-07 PROCEDURE — 71045 X-RAY EXAM CHEST 1 VIEW: CPT

## 2018-05-07 PROCEDURE — 700105 HCHG RX REV CODE 258: Performed by: INTERNAL MEDICINE

## 2018-05-07 PROCEDURE — 770006 HCHG ROOM/CARE - MED/SURG/GYN SEMI*

## 2018-05-07 PROCEDURE — 700102 HCHG RX REV CODE 250 W/ 637 OVERRIDE(OP): Performed by: NURSE PRACTITIONER

## 2018-05-07 PROCEDURE — 700101 HCHG RX REV CODE 250: Performed by: INTERNAL MEDICINE

## 2018-05-07 PROCEDURE — A9270 NON-COVERED ITEM OR SERVICE: HCPCS | Performed by: NURSE PRACTITIONER

## 2018-05-07 RX ORDER — CLINDAMYCIN PHOSPHATE 600 MG/50ML
600 INJECTION, SOLUTION INTRAVENOUS EVERY 8 HOURS
Status: DISCONTINUED | OUTPATIENT
Start: 2018-05-07 | End: 2018-05-08 | Stop reason: HOSPADM

## 2018-05-07 RX ADMIN — ACETAMINOPHEN 650 MG: 325 TABLET, FILM COATED ORAL at 17:57

## 2018-05-07 RX ADMIN — OXYCODONE HYDROCHLORIDE 10 MG: 10 TABLET ORAL at 11:05

## 2018-05-07 RX ADMIN — IBUPROFEN 600 MG: 600 TABLET, FILM COATED ORAL at 06:25

## 2018-05-07 RX ADMIN — IBUPROFEN 600 MG: 600 TABLET, FILM COATED ORAL at 12:22

## 2018-05-07 RX ADMIN — OXYCODONE HYDROCHLORIDE 10 MG: 10 TABLET ORAL at 06:25

## 2018-05-07 RX ADMIN — VANCOMYCIN HYDROCHLORIDE 1000 MG: 100 INJECTION, POWDER, LYOPHILIZED, FOR SOLUTION INTRAVENOUS at 06:28

## 2018-05-07 RX ADMIN — CLINDAMYCIN IN 5 PERCENT DEXTROSE 600 MG: 12 INJECTION, SOLUTION INTRAVENOUS at 09:12

## 2018-05-07 RX ADMIN — ACETAMINOPHEN 650 MG: 325 TABLET, FILM COATED ORAL at 06:25

## 2018-05-07 RX ADMIN — OXYCODONE HYDROCHLORIDE 10 MG: 10 TABLET ORAL at 02:06

## 2018-05-07 RX ADMIN — ACETAMINOPHEN 650 MG: 325 TABLET, FILM COATED ORAL at 12:22

## 2018-05-07 RX ADMIN — OXYCODONE HYDROCHLORIDE 10 MG: 10 TABLET ORAL at 16:16

## 2018-05-07 RX ADMIN — OXYCODONE HYDROCHLORIDE 10 MG: 10 TABLET ORAL at 21:04

## 2018-05-07 RX ADMIN — CLINDAMYCIN IN 5 PERCENT DEXTROSE 600 MG: 12 INJECTION, SOLUTION INTRAVENOUS at 21:09

## 2018-05-07 RX ADMIN — CLINDAMYCIN IN 5 PERCENT DEXTROSE 600 MG: 12 INJECTION, SOLUTION INTRAVENOUS at 16:11

## 2018-05-07 RX ADMIN — ACETAMINOPHEN 650 MG: 325 TABLET, FILM COATED ORAL at 01:05

## 2018-05-07 RX ADMIN — IBUPROFEN 600 MG: 600 TABLET, FILM COATED ORAL at 17:57

## 2018-05-07 ASSESSMENT — ENCOUNTER SYMPTOMS
POLYDIPSIA: 0
ROS GI COMMENTS: BM 5/6
COUGH: 1
ABDOMINAL PAIN: 0
BLURRED VISION: 0
BRUISES/BLEEDS EASILY: 0
SHORTNESS OF BREATH: 0
CHILLS: 0
PND: 0
BACK PAIN: 0
TINGLING: 0
EYE REDNESS: 0
HEMOPTYSIS: 0
DIZZINESS: 0
SINUS PAIN: 0
NERVOUS/ANXIOUS: 0
SPUTUM PRODUCTION: 0
DIARRHEA: 0
EYE DISCHARGE: 0
PHOTOPHOBIA: 0
ORTHOPNEA: 0
FALLS: 0
CONSTIPATION: 0
FOCAL WEAKNESS: 0
SENSORY CHANGE: 0
STRIDOR: 0
NECK PAIN: 0
FEVER: 0
SPEECH CHANGE: 0
PALPITATIONS: 0
VOMITING: 0
MYALGIAS: 0
WHEEZING: 0
DEPRESSION: 0
WEAKNESS: 0
NAUSEA: 0

## 2018-05-07 ASSESSMENT — PAIN SCALES - GENERAL
PAINLEVEL_OUTOF10: 5
PAINLEVEL_OUTOF10: 7
PAINLEVEL_OUTOF10: 5
PAINLEVEL_OUTOF10: 5
PAINLEVEL_OUTOF10: 6
PAINLEVEL_OUTOF10: 5
PAINLEVEL_OUTOF10: 8
PAINLEVEL_OUTOF10: 5
PAINLEVEL_OUTOF10: 6
PAINLEVEL_OUTOF10: 8

## 2018-05-07 NOTE — CARE PLAN
Problem: Safety  Goal: Will remain free from injury  Outcome: PROGRESSING AS EXPECTED    Goal: Will remain free from falls  Outcome: PROGRESSING AS EXPECTED      Problem: Infection  Goal: Will remain free from infection  Outcome: PROGRESSING AS EXPECTED      Problem: Venous Thromboembolism (VTW)/Deep Vein Thrombosis (DVT) Prevention:  Goal: Patient will participate in Venous Thrombosis (VTE)/Deep Vein Thrombosis (DVT)Prevention Measures  Outcome: PROGRESSING AS EXPECTED      Problem: Bowel/Gastric:  Goal: Normal bowel function is maintained or improved  Outcome: PROGRESSING AS EXPECTED    Goal: Will not experience complications related to bowel motility  Outcome: PROGRESSING AS EXPECTED      Problem: Knowledge Deficit  Goal: Knowledge of disease process/condition, treatment plan, diagnostic tests, and medications will improve  Outcome: PROGRESSING AS EXPECTED    Goal: Knowledge of the prescribed therapeutic regimen will improve  Outcome: PROGRESSING AS EXPECTED      Problem: Discharge Barriers/Planning  Goal: Patient's continuum of care needs will be met  Outcome: PROGRESSING AS EXPECTED      Problem: Pain Management  Goal: Pain level will decrease to patient's comfort goal  Outcome: PROGRESSING AS EXPECTED      Problem: Respiratory:  Goal: Respiratory status will improve  Outcome: PROGRESSING AS EXPECTED

## 2018-05-07 NOTE — PROGRESS NOTES
Bedside report completed.  A&O x4.  In no acute distress.   VSS.  SpO2 97% on RA.  Ambulating with stand-by assistance.   C/o 7/10 pain, medicated per MAR.  Tolerating diet. Denies N/V.  Old chest tube site with dressing, saturated with serosanguineous drainage. Dressing changed.  Fine crackles noted to left lobes, right side clear.   Last BM 5/6/18.  + void.  Call light and personal belongings within reach.  POC discussed and all questions answered.  No additional needs at this time.

## 2018-05-07 NOTE — PROGRESS NOTES
"Report received from night RN, assumed Care.   Patient is AOx4, responds appropriately.      Pain controlled at this time with po meds. Refusing lidocaine patches.  Refusing IV iron, refusing softeners at this time- educated on constipation related to opioid usage- verbalized understanding.  IVF per active order.  + new IV abx.   L side of chest wall with crepitous present upon assessment, dressing to old chest tube site is CDI.  Changed overnight per night RN- updated aprn in AM.  Declines SOB, on room air,  in place.  Patient is tolerating diet, denies nausea/vomiting. + flatus  Up standby with steady gait.  Reports activity intolerance with dizziness while ambulating for \"too long\".  IS max pull 1225    Plan of care discussed, all questions answered.    Lots of family/friends to bedside throughout the day.  Explained importance of calling before getting OOB and pt verbalizes understanding.       Call light and belongings within reach, treaded slipper socks on, SCD refused at this time, lovenox refused- educated, bed in lowest locked position.  Hourly rounding in place, all needs met at this time  "

## 2018-05-07 NOTE — CARE PLAN
Problem: Hyperinflation:  Goal: Prevent or improve atelectasis  Outcome: PROGRESSING AS EXPECTED  Pt is getting PEP pressure 15 qid,  IS value 1250

## 2018-05-07 NOTE — PROGRESS NOTES
Patient seen in f/u for complicated MRSA pneumonia     HPI:A 22-year-old female, otherwise healthy, who developed influenza   complicated by a community-acquired MRSA infection. Despite IV and oral   Antibiotics,   she has  progressed to developing empyema and cavitary   complications of her infection. On admit here, she presented with left sided pleuritic chest pain, and evaluation showed a loculated pleural effusion/empyema. She underwent decortication on May 2, followed with 2 chest tubes that have now been removed. She feels better, chest pain less, up sitting for breakfast.     Meds- iv vancomycin every 8 hours , now off pip/tazobactam     ROS- no fever   Resp-= mild cough, no sputum  Skin - no rash  GI- no n/v/diarrhea       Allergies - no antibiotic allergies       GENERAL:  comfortable appearing afebrile female   VITAL SIGNS:  Her vitals are completely negative.  She is not wearing oxygen.  NECK:  Her neck is supple.  LUNGS:  Her lungs are difficult to auscultate because she has subcutaneous   air.  There is no rhonchi when she coughs.  She has a chest tube present.  CARDIAC:  Reveals no murmur.    EXTREMITIES:  Show peripheral pulses are good with IV lines and she has no   peripheral edema.     Impression       1.  There is no pneumothorax.  2.  Mild left pleural effusion.  3.  Interval removal of right-sided chest tube.  4.  Linear right mid zone atelectasis.  5.  Possible airspace process in the left lower lung.   Reading Provider Reading Date   Higinio Lake M.D. May 7, 2018           Micro results from [facility]    Sputum- 2+ MRSA    vanco SHLOMO- 1  Clindamycin  <0.5  Susceptible to bactrim and tetracycline      Asses: complicated MRSA post- influenza pna, now s/p decortication  Doing better, chest tubes out and feeling improved     REC: stop iv vancomycin  '         Start iv clindamycin today            If continues to do well and tolerates the clindamycin,  can consider home tomorrow on oral  clindamycin 300mg TID for 3 weeks

## 2018-05-07 NOTE — PROGRESS NOTES
"  Trauma/Surgical Progress Note    Author: Iva Perez Date & Time created: 5/7/2018   11:25 AM     Interval Events:  Doing well, eager for possible discharge home tomorrow  CXR stable  Dressing change daily and prn    - No further recommendations from surgery  - Disposition per medicine service    Review of Systems   Constitutional: Negative for chills and fever.   Respiratory: Negative for shortness of breath.    Gastrointestinal: Negative for abdominal pain, constipation, nausea and vomiting.        BM 5/6   Genitourinary: Negative for dysuria.        Voiding   Skin: Negative for rash.   Neurological: Negative for dizziness and speech change.     Hemodynamics:  Blood pressure 116/70, pulse 69, temperature 36.6 °C (97.8 °F), resp. rate 18, height 1.6 m (5' 3\"), weight 51.9 kg (114 lb 6.7 oz), SpO2 97 %, not currently breastfeeding.     Respiratory:    Respiration: 18, Pulse Oximetry: 97 %, O2 Daily Delivery Respiratory : Room Air with O2 Available     PEP/CPT Method: Positive Airway Pressure Device, Work Of Breathing / Effort: Mild;Shallow  RUL Breath Sounds: Clear, RML Breath Sounds: Clear, RLL Breath Sounds: Diminished, CHINO Breath Sounds: Fine Crackles, LLL Breath Sounds: Fine Crackles  Fluids:    Intake/Output Summary (Last 24 hours) at 05/07/18 1125  Last data filed at 05/07/18 0900   Gross per 24 hour   Intake             2640 ml   Output              850 ml   Net             1790 ml     Admit Weight: 46.8 kg (103 lb 2.8 oz)  Current      Physical Exam   Constitutional: She is oriented to person, place, and time. She appears well-developed. No distress.   Neck: Neck supple.   Cardiovascular: Normal rate.    Pulmonary/Chest: Effort normal. No respiratory distress.   Dressing to previous left chest tube sites intact   Musculoskeletal:   Ambulatory   Neurological: She is alert and oriented to person, place, and time.   Skin: Skin is warm and dry.   Nursing note and vitals reviewed.      Medical Decision " Making/Problem List:    Active Hospital Problems    Diagnosis   • Empyema of left pleural space (HCC) [J86.9]     Priority: Medium     5/2 Thoracoscopy with decortication, 2 chest tubes placed.  5/4 Chest tubes to water seal.  5/5 Apical chest tube removed.  5/6 Basilar chest tube removed.  Yimi Rivera MD. General surgery.     • Thrombocytosis (HCC) [D47.3]     Core Measures & Quality Metrics:  Labs reviewed, Medications reviewed and Radiology images reviewed  Haas catheter: No Haas      DVT Prophylaxis: Enoxaparin (Lovenox)  DVT prophylaxis - mechanical: SCDs  Ulcer prophylaxis: Not indicated    Assessed for rehab: Patient returned to prior level of function, rehabilitation not indicated at this time    SHENG Score     Discussed patient condition with RN, Patient and general surgery. Dr. Rivera

## 2018-05-07 NOTE — NON-PROVIDER
Internal Medicine Medical Student Note  Note Author: Hernan Shields, Student    Name Maritza Swann     1995   Age/Sex 22 y.o. female   MRN [id number]   Code Status Full code     After 5PM or if no immediate response to page, please call for cross-coverage  Attending/Team: Dr. Hollingsworth See Patient List for primary contact information  Call [phone] to page after hours   1st Call - Day Intern (R1):   Dr. Garber 2nd Call - Day Sr. Resident (R2/R3):   Dr. Pompa         Reason for interval visit  (Principal Problem)   Empyema of left pleural space (HCC)    Interval Problem Daily Status Update  (24 hours)   No acute overnight events. Left basilar chest tube was removed yesterday with no apparent complications. ID also visited the patient yesterday and recommended that the Zosyn be discontinued. ID will also try to get lab results from [facility] to help guide ABx regimen. Patient currently on Vancomycin.    ROS            Physical Exam       Vitals:    18 1655 18 1925 18 2320 18 0320   BP: 141/90 131/76 134/87 117/70   Pulse: 84 91 90 76   Resp: 20 18 18 17   Temp: 36.8 °C (98.3 °F) 37.1 °C (98.7 °F) 36.7 °C (98 °F) 36.5 °C (97.7 °F)   SpO2: 100% 92% 97% 95%   Weight:       Height:         Body mass index is 20.27 kg/m².    Oxygen Therapy:  Pulse Oximetry: 95 %, O2 (LPM): 0, O2 Delivery: None (Room Air)    Physical Exam            Labs  NO LABS OVERNIGHT, ALL LABS FROM YESTERDAY  Recent Labs      18   0825  18   0241  18   0200   WBC  11.4*  9.8  9.0   RBC  2.90*  2.69*  2.80*   HEMOGLOBIN  9.1*  8.4*  8.8*   HEMATOCRIT  28.2*  26.5*  27.4*   MCV  97.2  98.5*  97.9*   MCH  31.4  31.2  31.4   RDW  45.7  45.4  46.1   PLATELETCT  489*  454*  456*   MPV  9.0  9.0  9.3   NEUTSPOLYS   --   64.20  66.00   LYMPHOCYTES   --   21.20*  21.50*   MONOCYTES   --   9.80  8.00   EOSINOPHILS   --   3.50  3.70   BASOPHILS   --   0.50  0.40       Recent Labs       05/05/18   0241  05/06/18   0200   SODIUM  142  138   POTASSIUM  3.5*  3.7   CHLORIDE  109  106   CO2  25  25   GLUCOSE  93  83   BUN  5*  6*     Recent Labs      05/05/18   0241  05/06/18   0200   CREATININE  0.49*  0.53       Imaging  Portable CXR: Image not yet read but demonstrates  - Left basilar chest tube removed.  - Bibasilar infiltrates.  - Subcutaneous emphysema along the left lateral chest wall and neck area.  - Left Pleural effusion  - Trace right pleural effusion    Assessment/Plan     #Empyema  Patient had SOB for 2 days prior to admission and was worse when she was laying on her left side. Although she is able to speak in complete sentences, she takes deep breaths and looks mildly uncomfortable. Empyema was demonstrated by CXR in the ED and confirmed on the CT scan; however other etiologies like PE were considered especially since her D-dimer came back to 2687 (elevated). Her Wells score for PE was 1.5 which corresponds to a 1.3% risk of actually having a PE. CT did not demonstrate PE but demonstrated the appearance of loculated fluid. Patient was sent to surgery for thorascopy with decortication, tissue cultures were sent to the lab and results have thus far been unremarkable for any bacterial etiology. Surgery was able to remove the left apical chest tube on 05/05/2018 and the basilar chest tube on 05/06/2018 with no apparent issue.     Plan  - IV Vancomycin 1000mg in 500cc TID, per ID  - Oxycodone immediate release 10mg [id number] PRN  - Ibuprofen 600mg every 6 hours PRN  - Continue to monitor O2 saturation  - If O2 saturation < 90%, provide supplemental O2 as needed.        #Pneumonia  Patient presented with SOB and has a recent history of being treated for MRSA pneumonia with linezolid. She was complaining about left sided chest pain as well. Her CURB 65 score was 0 on presentation which corresponds to a 0.6% chance of mortality at 30 days. CT of the chest did demonstrate cavitation with parenchymal  inflammation, consistent with her history of MRSA infection. Other etiologies like reactive airway disease have been considered as well. She is having productive cough but her vitals have thus far been WNL. CXR are showing atelectasis in the right lower lobe and are being monitored.     Plan  - IV Vancomycin started  - Guaifenesin 10mg/5cc syrup [id number] PRN  - Blood cultures drawn x2, results negative.  - Urine culture negative  - CTM O2 saturation and vital signs.        #Thrombocytosis  Patient had elevated platelet levels at presentation. She continues to have thrombocytosis despite IV fluids over the night. She most likely has reactive thrombocytosis secondary to her recent MRSA infection and recent pleural effusion. If her thrombocytosis remains elevated, she may need to be referred to hematology as an outpatient.     Plan  - Continue to monitor platelet count.         #Anemia  Patient has been having low H/H since her admission to the hospital. Her Fe studies demonstrated that she has decreased Fe and Fe saturation which is most consistent DARIUS. ACD was also considered due to her previous MRSA infection about 1 month ago. She is complaining of weakness and fatigue but it is unclear if this is due to the surgery and/or due to the anemia.     Plan  - Fe Sucrose 200mg injection daily  - Monitor H/H to trend levels overtime.  - Anticipate that her H/H will increase with Fe supplementation.        #Constipation  Patient had 2 bowel movements over the night of 05/04/2018. She denies any abdominal pain and is able to pass gas (no obstipation). Constipation is most likely due to her opiate pain medication regimen.     Plan  - Miralax to induce bowel movement.  - Patient is also on bowel protocol.        #DVT prophylaxis  - Enoxaparin 30mg for DVT prophylaxis

## 2018-05-07 NOTE — PROGRESS NOTES
Internal Medicine Interval Note  Note Author: Ignacio Huber M.D.     Name Maritza Swann 1995   Age/Sex 22 y.o. female   MRN [id number]   Code Status Full Code       After 5PM or if no immediate response to page, please call for cross-coverage  Attending/Team: Dr. Hollingsworth  / [place]  See Patient List for primary contact information  Call [phone] to page    1st Call - Day Intern (R1):   Dr. Huber 2nd Call - Day Sr. Resident (R2/R3):   Dr. Pompa            Reason for interval visit  (Principal Problem)   Empyema of left pleural space (HCC)    Interval Problem Daily Status Update  (24 hours)   No events overnight, 2nd chest tube removed yesterday, patient still complaining of pain, controlled with medication, instructed to move around and increased physical activity level, Vanco switched to clindamycin today, will treat her with IV today, if patient can tolerate we will discharge her on oral clindamycin for 3 weeks.  Appreciate ID recommendations, ordered repeat chest x-ray tomorrow.               Review of Systems   Constitutional: Negative for chills, fever and malaise/fatigue.   HENT: Negative for hearing loss and sinus pain.    Eyes: Negative for blurred vision, photophobia, discharge and redness.   Respiratory: Positive for cough. Negative for hemoptysis, sputum production, wheezing and stridor.    Cardiovascular: Positive for chest pain. Negative for palpitations, orthopnea, leg swelling and PND.   Gastrointestinal: Negative for abdominal pain, constipation, diarrhea, nausea and vomiting.   Genitourinary: Negative.  Negative for dysuria, frequency, hematuria and urgency.   Musculoskeletal: Negative for back pain, falls, myalgias and neck pain.   Skin: Negative for itching and rash.   Neurological: Negative for dizziness, tingling, sensory change, speech change, focal weakness and weakness.   Endo/Heme/Allergies: Negative for polydipsia. Does not bruise/bleed easily.    Psychiatric/Behavioral: Negative for depression and suicidal ideas. The patient is not nervous/anxious.        Consultants/Specialty  Surgery, Dr. Rivera  Infectious disease, Dr. Leger    Disposition  Inpatient treated for Empyema, anticipating discharge home with Abx after CT removal.      Quality Measures  Quality-Core Measures   Reviewed items::  Labs reviewed and Medications reviewed  Haas catheter::  No Haas  DVT prophylaxis pharmacological::  Enoxaparin (Lovenox)  Antibiotics:  Treating active infection/contamination beyond 24 hours perioperative coverage          Physical Exam       Vitals:    05/06/18 1925 05/06/18 2320 05/07/18 0320 05/07/18 0738   BP: 131/76 134/87 117/70 116/70   Pulse: 91 90 76 69   Resp: 18 18 17 18   Temp: 37.1 °C (98.7 °F) 36.7 °C (98 °F) 36.5 °C (97.7 °F) 36.6 °C (97.8 °F)   SpO2: 92% 97% 95% 97%   Weight:       Height:         Body mass index is 20.27 kg/m².    Oxygen Therapy:  Pulse Oximetry: 97 %, O2 (LPM): 0, FiO2%: 21 %, O2 Delivery: None (Room Air)    Physical Exam   Constitutional: She is oriented to person, place, and time. No distress.   HENT:   Head: Normocephalic and atraumatic.   Eyes: Pupils are equal, round, and reactive to light.   Neck: Normal range of motion. Neck supple.   Cardiovascular: Normal rate and regular rhythm.  Exam reveals no gallop and no friction rub.    No murmur heard.  Pulmonary/Chest: Effort normal. No respiratory distress. She has decreased breath sounds in the left middle field and the left lower field. She has no wheezes. She exhibits tenderness.   Left side subcutaneous emphysema around the chest tube insertion site   Abdominal: Soft. Bowel sounds are normal. She exhibits no distension. There is no tenderness.   Musculoskeletal: She exhibits no edema.   Neurological: She is alert and oriented to person, place, and time. No cranial nerve deficit.   Skin: No rash noted. She is not diaphoretic. No erythema. No pallor.   Psychiatric: Mood and  affect normal.   Nursing note and vitals reviewed.        Lab Data Review:         5/3/2018  7:23 PM    Recent Labs      05/05/18   0241  05/06/18   0200   SODIUM  142  138   POTASSIUM  3.5*  3.7   CHLORIDE  109  106   CO2  25  25   BUN  5*  6*   CREATININE  0.49*  0.53   CALCIUM  8.0*  8.1*       Recent Labs      05/05/18   0241  05/06/18   0200   GLUCOSE  93  83       Recent Labs      05/05/18   0241  05/06/18   0200   RBC  2.69*  2.80*   HEMOGLOBIN  8.4*  8.8*   HEMATOCRIT  26.5*  27.4*   PLATELETCT  454*  456*   FERRITIN   --   236.6       Recent Labs      05/05/18 0241  05/06/18   0200   WBC  9.8  9.0   NEUTSPOLYS  64.20  66.00   LYMPHOCYTES  21.20*  21.50*   MONOCYTES  9.80  8.00   EOSINOPHILS  3.50  3.70   BASOPHILS  0.50  0.40           Assessment/Plan     * Empyema of left pleural space (HCC)- (present on admission)   Assessment & Plan    - Surgery was consulted, Dr Rivera did thoracoscopy, decortication, drainage of pleural effusion with 2 chest tubes on 5/2.  - on Vanc  - There is subcutaneous emphysema.   - tissue cultures pending, so far cultures are negative  - First tube DCed in 5/5, 2nd tube DCed in 5/6  - started on Vanc and Zosyn. Zosyn DCed 5/6/2018, Vanc switched to Clindamycin 5/7/2018              Thrombocytosis (HCC)- (present on admission)   Assessment & Plan    - Platelet count 726  - Possibly due to dehydration or reactive  - Monitor platelet count            Ignacio Resendiz M.D.  Internal Medicine Resident, PGY-1     The patient was seen by me face to face. I personally had a discussion with the patient and also her boy friend at bedside. The case was discussed with our resident team, I examined the patient and confirmed the essential components of the history, physical examination, diagnosis and treatment plan as needed. I agree with the patient care as documented by the resident and edited as above by me. See resident's note above for complete details of service. The overall  treatment regimen will be carried out as described above.     Thank you:  Genaro Hollingsworth MD, FACP  [facility]  Pager: Use Tiger Text (use resident info under treatment team for day to day floor issues)  Office: [phone] Ext. [phone]  Fax: [phone] (non PHI only)

## 2018-05-08 ENCOUNTER — APPOINTMENT (OUTPATIENT)
Dept: RADIOLOGY | Facility: MEDICAL CENTER | Age: 23
DRG: 163 | End: 2018-05-08
Attending: STUDENT IN AN ORGANIZED HEALTH CARE EDUCATION/TRAINING PROGRAM
Payer: MEDICAID

## 2018-05-08 ENCOUNTER — APPOINTMENT (OUTPATIENT)
Dept: RADIOLOGY | Facility: MEDICAL CENTER | Age: 23
DRG: 163 | End: 2018-05-08
Attending: NURSE PRACTITIONER
Payer: MEDICAID

## 2018-05-08 VITALS
HEIGHT: 63 IN | RESPIRATION RATE: 17 BRPM | OXYGEN SATURATION: 97 % | WEIGHT: 114.42 LBS | BODY MASS INDEX: 20.27 KG/M2 | DIASTOLIC BLOOD PRESSURE: 79 MMHG | TEMPERATURE: 97.4 F | SYSTOLIC BLOOD PRESSURE: 132 MMHG | HEART RATE: 73 BPM

## 2018-05-08 PROBLEM — D75.839 THROMBOCYTOSIS: Status: RESOLVED | Noted: 2018-05-01 | Resolved: 2018-05-08

## 2018-05-08 LAB
ALBUMIN SERPL BCP-MCNC: 3.2 G/DL (ref 3.2–4.9)
ALBUMIN/GLOB SERPL: 0.8 G/DL
ALP SERPL-CCNC: 62 U/L (ref 30–99)
ALT SERPL-CCNC: 10 U/L (ref 2–50)
ANION GAP SERPL CALC-SCNC: 9 MMOL/L (ref 0–11.9)
AST SERPL-CCNC: 13 U/L (ref 12–45)
BASOPHILS # BLD AUTO: 0.3 % (ref 0–1.8)
BASOPHILS # BLD: 0.03 K/UL (ref 0–0.12)
BILIRUB SERPL-MCNC: 0.3 MG/DL (ref 0.1–1.5)
BUN SERPL-MCNC: 8 MG/DL (ref 8–22)
CALCIUM SERPL-MCNC: 9.5 MG/DL (ref 8.5–10.5)
CHLORIDE SERPL-SCNC: 101 MMOL/L (ref 96–112)
CO2 SERPL-SCNC: 29 MMOL/L (ref 20–33)
CREAT SERPL-MCNC: 0.58 MG/DL (ref 0.5–1.4)
EOSINOPHIL # BLD AUTO: 0.34 K/UL (ref 0–0.51)
EOSINOPHIL NFR BLD: 3.7 % (ref 0–6.9)
ERYTHROCYTE [DISTWIDTH] IN BLOOD BY AUTOMATED COUNT: 49.9 FL (ref 35.9–50)
GLOBULIN SER CALC-MCNC: 3.9 G/DL (ref 1.9–3.5)
GLUCOSE SERPL-MCNC: 86 MG/DL (ref 65–99)
HCT VFR BLD AUTO: 31.2 % (ref 37–47)
HGB BLD-MCNC: 9.7 G/DL (ref 12–16)
IMM GRANULOCYTES # BLD AUTO: 0.08 K/UL (ref 0–0.11)
IMM GRANULOCYTES NFR BLD AUTO: 0.9 % (ref 0–0.9)
LYMPHOCYTES # BLD AUTO: 2.65 K/UL (ref 1–4.8)
LYMPHOCYTES NFR BLD: 28.5 % (ref 22–41)
MCH RBC QN AUTO: 31 PG (ref 27–33)
MCHC RBC AUTO-ENTMCNC: 31.1 G/DL (ref 33.6–35)
MCV RBC AUTO: 99.7 FL (ref 81.4–97.8)
MONOCYTES # BLD AUTO: 0.72 K/UL (ref 0–0.85)
MONOCYTES NFR BLD AUTO: 7.8 % (ref 0–13.4)
NEUTROPHILS # BLD AUTO: 5.47 K/UL (ref 2–7.15)
NEUTROPHILS NFR BLD: 58.8 % (ref 44–72)
NRBC # BLD AUTO: 0 K/UL
NRBC BLD-RTO: 0 /100 WBC
PLATELET # BLD AUTO: 471 K/UL (ref 164–446)
PMV BLD AUTO: 9.3 FL (ref 9–12.9)
POTASSIUM SERPL-SCNC: 4.4 MMOL/L (ref 3.6–5.5)
PROT SERPL-MCNC: 7.1 G/DL (ref 6–8.2)
RBC # BLD AUTO: 3.13 M/UL (ref 4.2–5.4)
SODIUM SERPL-SCNC: 139 MMOL/L (ref 135–145)
WBC # BLD AUTO: 9.3 K/UL (ref 4.8–10.8)

## 2018-05-08 PROCEDURE — 99239 HOSP IP/OBS DSCHRG MGMT >30: CPT | Performed by: HOSPITALIST

## 2018-05-08 PROCEDURE — 85025 COMPLETE CBC W/AUTO DIFF WBC: CPT

## 2018-05-08 PROCEDURE — 71045 X-RAY EXAM CHEST 1 VIEW: CPT

## 2018-05-08 PROCEDURE — 700101 HCHG RX REV CODE 250: Performed by: INTERNAL MEDICINE

## 2018-05-08 PROCEDURE — 700111 HCHG RX REV CODE 636 W/ 250 OVERRIDE (IP): Performed by: STUDENT IN AN ORGANIZED HEALTH CARE EDUCATION/TRAINING PROGRAM

## 2018-05-08 PROCEDURE — A9270 NON-COVERED ITEM OR SERVICE: HCPCS | Performed by: NURSE PRACTITIONER

## 2018-05-08 PROCEDURE — 36415 COLL VENOUS BLD VENIPUNCTURE: CPT

## 2018-05-08 PROCEDURE — 700102 HCHG RX REV CODE 250 W/ 637 OVERRIDE(OP): Performed by: NURSE PRACTITIONER

## 2018-05-08 PROCEDURE — 80053 COMPREHEN METABOLIC PANEL: CPT

## 2018-05-08 RX ORDER — ONDANSETRON 4 MG/1
4 TABLET, ORALLY DISINTEGRATING ORAL EVERY 8 HOURS PRN
Qty: 30 TAB | Refills: 0 | Status: SHIPPED | OUTPATIENT
Start: 2018-05-08 | End: 2018-06-06

## 2018-05-08 RX ORDER — ACETAMINOPHEN 500 MG
1000 TABLET ORAL 3 TIMES DAILY
Qty: 30 TAB | Refills: 0 | COMMUNITY
Start: 2018-05-08 | End: 2018-06-06

## 2018-05-08 RX ORDER — CLINDAMYCIN HYDROCHLORIDE 300 MG/1
300 CAPSULE ORAL 3 TIMES DAILY
Qty: 63 CAP | Refills: 0 | Status: SHIPPED | OUTPATIENT
Start: 2018-05-08 | End: 2018-05-29

## 2018-05-08 RX ORDER — ONDANSETRON 4 MG/1
4 TABLET, ORALLY DISINTEGRATING ORAL EVERY 4 HOURS PRN
Status: DISCONTINUED | OUTPATIENT
Start: 2018-05-08 | End: 2018-05-08 | Stop reason: HOSPADM

## 2018-05-08 RX ORDER — OXYCODONE HYDROCHLORIDE 5 MG/1
5 TABLET ORAL EVERY 8 HOURS PRN
Qty: 30 TAB | Refills: 0 | Status: SHIPPED | OUTPATIENT
Start: 2018-05-08 | End: 2018-05-18

## 2018-05-08 RX ADMIN — ONDANSETRON 4 MG: 4 TABLET, ORALLY DISINTEGRATING ORAL at 14:12

## 2018-05-08 RX ADMIN — ACETAMINOPHEN 650 MG: 325 TABLET, FILM COATED ORAL at 13:07

## 2018-05-08 RX ADMIN — OXYCODONE HYDROCHLORIDE 10 MG: 10 TABLET ORAL at 14:12

## 2018-05-08 RX ADMIN — OXYCODONE HYDROCHLORIDE 10 MG: 10 TABLET ORAL at 10:02

## 2018-05-08 RX ADMIN — IBUPROFEN 600 MG: 600 TABLET, FILM COATED ORAL at 13:07

## 2018-05-08 RX ADMIN — CLINDAMYCIN IN 5 PERCENT DEXTROSE 600 MG: 12 INJECTION, SOLUTION INTRAVENOUS at 14:12

## 2018-05-08 RX ADMIN — POTASSIUM CHLORIDE AND SODIUM CHLORIDE: 450; 150 INJECTION, SOLUTION INTRAVENOUS at 01:19

## 2018-05-08 RX ADMIN — PROCHLORPERAZINE EDISYLATE 10 MG: 5 INJECTION INTRAMUSCULAR; INTRAVENOUS at 01:16

## 2018-05-08 RX ADMIN — CLINDAMYCIN IN 5 PERCENT DEXTROSE 600 MG: 12 INJECTION, SOLUTION INTRAVENOUS at 05:34

## 2018-05-08 RX ADMIN — ACETAMINOPHEN 650 MG: 325 TABLET, FILM COATED ORAL at 01:16

## 2018-05-08 RX ADMIN — ACETAMINOPHEN 650 MG: 325 TABLET, FILM COATED ORAL at 05:34

## 2018-05-08 RX ADMIN — OXYCODONE HYDROCHLORIDE 10 MG: 10 TABLET ORAL at 05:34

## 2018-05-08 RX ADMIN — IBUPROFEN 600 MG: 600 TABLET, FILM COATED ORAL at 05:34

## 2018-05-08 RX ADMIN — OXYCODONE HYDROCHLORIDE 10 MG: 10 TABLET ORAL at 01:16

## 2018-05-08 RX ADMIN — ONDANSETRON 4 MG: 4 TABLET, ORALLY DISINTEGRATING ORAL at 10:02

## 2018-05-08 ASSESSMENT — PAIN SCALES - GENERAL
PAINLEVEL_OUTOF10: 0
PAINLEVEL_OUTOF10: 5
PAINLEVEL_OUTOF10: 8
PAINLEVEL_OUTOF10: 5
PAINLEVEL_OUTOF10: 0
PAINLEVEL_OUTOF10: 6

## 2018-05-08 NOTE — PROGRESS NOTES
Report received, assumed Care.   Patient is AOx4  Pain 5/10, medicated per MAR.   PIV saline locked.  On room air  IS max pull 1225    L side of chest wall with crepitous present upon assessment, dressing to old chest tube site is CDI.    Patient is tolerating diet, c/o nausea, medicated per MAR    Plan: to discharge home today per MD order    Patient call light within reach, bed in the lowest position.   POC discussed with patient.

## 2018-05-08 NOTE — DISCHARGE INSTRUCTIONS
Discharge Instructions    Discharged to home by car with friend. Discharged via wheelchair, hospital escort: Yes.  Special equipment needed: Not Applicable    Be sure to schedule a follow-up appointment with your primary care doctor or any specialists as instructed.     Discharge Plan:   Diet Plan: Discussed  Activity Level: Discussed  Confirmed Follow up Appointment: Patient to Call and Schedule Appointment  Confirmed Symptoms Management: Discussed  Medication Reconciliation Updated: Yes  Influenza Vaccine Indication: Patient Refuses    I understand that a diet low in cholesterol, fat, and sodium is recommended for good health. Unless I have been given specific instructions below for another diet, I accept this instruction as my diet prescription.   Other diet: follow your regular diet as tolerated    Special Instructions: None    · Is patient discharged on Warfarin / Coumadin?   No     Depression / Suicide Risk    As you are discharged from this [facility] facility, it is important to learn how to keep safe from harming yourself.    Recognize the warning signs:  · Abrupt changes in personality, positive or negative- including increase in energy   · Giving away possessions  · Change in eating patterns- significant weight changes-  positive or negative  · Change in sleeping patterns- unable to sleep or sleeping all the time   · Unwillingness or inability to communicate  · Depression  · Unusual sadness, discouragement and loneliness  · Talk of wanting to die  · Neglect of personal appearance   · Rebelliousness- reckless behavior  · Withdrawal from people/activities they love  · Confusion- inability to concentrate     If you or a loved one observes any of these behaviors or has concerns about self-harm, here's what you can do:  · Talk about it- your feelings and reasons for harming yourself  · Remove any means that you might use to hurt yourself (examples: pills, rope, extension cords, firearm)  · Get professional  help from the community (Mental Health, Substance Abuse, psychological counseling)  · Do not be alone:Call your Safe Contact- someone whom you trust who will be there for you.  · Call your local CRISIS HOTLINE 201-9809 or 695-669-6567  · Call your local Children's Mobile Crisis Response Team [place] (517) 674-4868 or www.Credit [employer]  · Call the toll free National Suicide Prevention Hotlines   · National Suicide Prevention Lifeline 294-173-EVJW (1113)  · National Hope Line Network 800-SUICIDE (235-6734)  Incentive Spirometer  An incentive spirometer is a tool that measures how well you are filling your lungs with each breath. This tool can help keep your lungs clear and active. Taking long, deep breaths may help reverse or decrease the chance of developing breathing (pulmonary) problems, especially infection, following:  · Surgery of the chest or abdomen.  · Surgery if you have a history of smoking or a lung problem.  · A long period of time when you are unable to move or be active.  If the spirometer includes an indicator to show your best effort, your health care provider or respiratory therapist will help you set a goal. Keep a log of your progress if directed by your health care provider.  What are the risks?  · Breathing too quickly may cause dizziness or cause you to pass out. Take your time so you do not get dizzy or lightheaded.  · If you are in pain, you may need to take or ask for pain medicine before doing incentive spirometry. It is harder to take a deep breath if you are having pain.  How to use your incentive spirometer  2. Sit on the edge of your bed if possible, or sit up as far as you can in bed or on a chair.  3. Hold the incentive spirometer in an upright position.  4. Breathe out normally.  5. Place the mouthpiece in your mouth and seal your lips tightly around it.  6. Breathe in slowly and as deeply as possible, raising the piston or the ball toward the top of the column.  7. Hold your  breath for 3-5 seconds or for as long as possible. Allow the piston or ball to fall to the bottom of the column.  8. Remove the mouthpiece from your mouth and breathe out normally.  9. The spirometer may include an indicator to show your best effort. Use the indicator as a goal to work toward during each repetition.  10. Rest for a few seconds and repeat this at least 10 times, every 1-2 hours when you are awake. Take your time and take a few normal breaths between deep breaths. Breathing too quickly may cause dizziness or cause you to pass out. Take your time so you do not get dizzy or lightheaded.  11. After each set of 10 deep breaths, practice coughing to be sure your lungs are clear. If you had a surgical cut (incision) made during surgery, support your incision when coughing by placing a pillow or rolled-up towel firmly against it.  Once you are able to get out of bed, walk around indoors and cough well. You may stop using the incentive spirometer when instructed by your health care provider.  Contact a health care provider if:  · You are having difficulty using the spirometer.  · You have trouble using the spirometer as often as instructed.  · Your pain medicine is not giving enough relief while using the spirometer.  · You have a fever.  · You develop shortness of breath.  Get help right away if:  · You develop a cough with bloody sputum.  · You develop worsening pain, redness, or discharge at or near the incision site.  This information is not intended to replace advice given to you by your health care provider. Make sure you discuss any questions you have with your health care provider.  Document Released: 04/29/2008 Document Revised: 09/11/2017 Document Reviewed: 07/27/2015  Elsevier Interactive Patient Education © 2017 [employer]      Thoracotomy, Care After   These instructions give you information on caring for yourself after your procedure. Your doctor may also give you more specific instructions.  Call your doctor if you have any problems or questions after your procedure.  HOME CARE  · Only take medicine as told by your doctor. Take pain medicine before your pain gets very bad.  · Take deep breaths to protect yourself from a lung infection (pneumonia).  · Cough often to clear thick spit (mucus) and to open your lungs. Hold a pillow against your chest if it hurts to cough.  · Use your breathing machine (incentive spirometer) as told.  · Change bandages (dressings) as told by your doctor.  · Take off the bandage over your chest tube area as told by your doctor.  · Continue your normal diet as told by your doctor.  · Eat high-fiber foods. This includes whole grain cereals, brown rice, beans, and fresh fruits and vegetables.  · Drink enough fluids to keep your pee (urine) clear or pale yellow. Avoid caffeine.  · Talk to your doctor about taking a medicine to soften your poop (stool softener, laxative).  · Keep doing activities as told by your doctor. Balance your activity with rest.  · Avoid lifting until your doctor says it is okay.  · Do not drive until told by your doctor. Do not drive while taking pain medicines (narcotics).  · Do not bathe, swim, or use a hot tub until told by your doctor. You may shower. Gently wash the area of your cut (incision) with soap and water as told.  · Do not use any tobacco products including cigarettes, chewing tobacco, and electronic cigarettes. Avoid being around others who smoke.  · Schedule a doctor visit to get your stitches or staples removed as told.  · Schedule and go to all doctor visits as told.  · Go to therapy that can help improve your lungs (pulmonary rehabilitation) as told by your doctor.  · Do not travel by airplane for 2 weeks after the chest tube is taken out.  GET HELP IF:  · You are bleeding from your wounds.  · You have redness, puffiness (swelling), or more pain in the wounds.  · You feel your heart beating fast or skipping beats (irregular  heartbeat).  · You have yellowish-white fluid (pus) coming from your wounds.  · You have a bad smell coming from the wound or bandage.  · You have a fever or chills.  · You feel sick to your stomach or you throw up (vomit).  · You have muscle aches.  GET HELP RIGHT AWAY IF:  · You have a rash.  · You have trouble breathing.  · Your medicines are causing you problems.  · You keep feeling sick to your stomach (nauseous).  · You feel light-headed.  · You have shortness of breath or chest pain.  · You have pain that will not go away.  MAKE SURE YOU:  · Understand these instructions.  · Will watch your condition.  · Will get help right away if you are not doing well or get worse.     This information is not intended to replace advice given to you by your health care provider. Make sure you discuss any questions you have with your health care provider.     Document Released: 06/18/2013 Document Revised: 01/08/2016 Document Reviewed: 08/06/2014  [employer] Interactive Patient Education ©2016 [employer].    Incision Care, Adult  An incision is a cut that a doctor makes in your skin for surgery (for a procedure). Most times, these cuts are closed after surgery. Your cut from surgery may be closed with stitches (sutures), staples, skin glue, or skin tape (adhesive strips). You may need to return to your doctor to have stitches or staples taken out. This may happen many days or many weeks after your surgery. The cut needs to be well cared for so it does not get infected.  How to care for your cut  Cut care  · Follow instructions from your doctor about how to take care of your cut. Make sure you:  ¨ Wash your hands with soap and water before you change your bandage (dressing). If you cannot use soap and water, use hand .  ¨ Change your bandage as told by your doctor.  ¨ Leave stitches, skin glue, or skin tape in place. They may need to stay in place for 2 weeks or longer. If tape strips get loose and curl up, you may  trim the loose edges. Do not remove tape strips completely unless your doctor says it is okay.  · Check your cut area every day for signs of infection. Check for:  ¨ More redness, swelling, or pain.  ¨ More fluid or blood.  ¨ Warmth.  ¨ Pus or a bad smell.  · Ask your doctor how to clean the cut. This may include:  ¨ Using mild soap and water.  ¨ Using a clean towel to pat the cut dry after you clean it.  ¨ Putting a cream or ointment on the cut. Do this only as told by your doctor.  ¨ Covering the cut with a clean bandage.  · Ask your doctor when you can leave the cut uncovered.  · Do not take baths, swim, or use a hot tub until your doctor says it is okay. Ask your doctor if you can take showers. You may only be allowed to take sponge baths for bathing.  Medicines  · If you were prescribed an antibiotic medicine, cream, or ointment, take the antibiotic or put it on the cut as told by your doctor. Do not stop taking or putting on the antibiotic even if your condition gets better.  · Take over-the-counter and prescription medicines only as told by your doctor.  General instructions  · Limit movement around your cut. This helps healing.  ¨ Avoid straining, lifting, or exercise for the first month, or for as long as told by your doctor.  ¨ Follow instructions from your doctor about going back to your normal activities.  ¨ Ask your doctor what activities are safe.  · Protect your cut from the sun when you are outside for the first 6 months, or for as long as told by your doctor. Put on sunscreen around the scar or cover up the scar.  · Keep all follow-up visits as told by your doctor. This is important.  Contact a doctor if:  · Your have more redness, swelling, or pain around the cut.  · You have more fluid or blood coming from the cut.  · Your cut feels warm to the touch.  · You have pus or a bad smell coming from the cut.  · You have a fever or shaking chills.  · You feel sick to your stomach (nauseous) or you throw  up (vomit).  · You are dizzy.  · Your stitches or staples come undone.  Get help right away if:  · You have a red streak coming from your cut.  · Your cut bleeds through the bandage and the bleeding does not stop with gentle pressure.  · The edges of your cut open up and separate.  · You have very bad (severe) pain.  · You have a rash.  · You are confused.  · You pass out (faint).  · You have trouble breathing and you have a fast heartbeat.  This information is not intended to replace advice given to you by your health care provider. Make sure you discuss any questions you have with your health care provider.  Document Released: 03/11/2013 Document Revised: 08/25/2017 Document Reviewed: 08/25/2017  Elsevier Interactive Patient Education © 2017 [employer]    Antibiotic Medicine  Introduction  Antibiotic medicines are used to treat infections caused by bacteria. They work by hurting or killing the germs that are making you sick.  How will my medicine be picked?  There are many kinds of antibiotic medicines. To help your doctor pick one, tell your doctor if:  · You have any allergies.  · You are pregnant or plan to get pregnant.  · You are breastfeeding.  · You are taking any medicines. These include over-the-counter medicines, prescription medicines, and herbal remedies.  · You have a medical condition or problem.  If you have questions about why your medicine was picked, ask.  For how long should I take my medicine?  Take your medicine for as long as your doctor tells you to. Do not stop taking it when you feel better. If you stop taking it too soon:  · You may start to feel sick again.  · Your infection may get harder to treat.  · New problems may develop.  What if I miss a dose?  Try not to miss any doses of antibiotic medicine. If you miss a dose:  · Take the dose as soon as you can.  · If you are taking 2 doses a day, take the next dose in 5 to 6 hours.  · If you are taking 3 or more doses a day, take the next  dose in 2 to 4 hours. Then go back to the normal schedule.  If you cannot take a missed dose, take the next dose on time. Then take the missed dose after you have taken all the doses as told by your doctor, as if you had one more dose left.  Does this medicine affect birth control?  Birth control pills may not work while you are on antibiotic medicines. If you are taking birth control pills, keep taking them as usual. Use a second form of birth control, such as a condom. Keep using the second form of birth control until you are finished with your current 1 month cycle of birth control pills.  Get help if:  · You get worse.  · You do not feel better a few days after starting the medicine.  · You throw up (vomit).  · There are white patches in your mouth.  · You have new joint pain after starting the medicine.  · You have new muscle aches after starting the medicine.  · You had a fever before starting the medicine, and it comes back.  · You have any symptoms of an allergic reaction, such as an itchy rash. If this happens, stop taking the medicine.  Get help right away if:  · Your pee (urine) turns dark or becomes blood-colored.  · Your skin turns yellow.  · You bruise or bleed easily.  · You have very bad watery poop (diarrhea) and cramps in your belly (abdomen).  · You have a very bad headache.  · You have signs of a very bad allergic reaction, such as:  ¨ Trouble breathing.  ¨ Wheezing.  ¨ Swelling of the lips, tongue, or face.  ¨ Fainting.  ¨ Blisters on the skin or in the mouth.  If you have signs of a very bad allergic reaction, stop taking the antibiotic medicine right away.  This information is not intended to replace advice given to you by your health care provider. Make sure you discuss any questions you have with your health care provider.  Document Released: 09/26/2009 Document Revised: 08/15/2017 Document Reviewed: 05/04/2016  © 2017 Elsevier

## 2018-05-08 NOTE — PROGRESS NOTES
Pt. Discharged home with friend via wheelchair and wheeled out by RN.   PIV D/C     Discharge instructions, prescriptions, IS,  and follow up appointments discussed. Pt. Verbalized understanding.

## 2018-05-08 NOTE — PROGRESS NOTES
Bedside report completed.  A&O x4.  VSS.  IVF 75 ml/hr.   Ambulating with stand-by assistance, steady gait.   C/o 7/10 pain, medicated per MAR.  Tolerating diet.  Denies N/V.    Left old chest tube site with dressing, CDI.   + void.  Last BM 5/7/18.  Call light and personal belongings within reach.  POC discussed and all questions answered.  No additional needs at this time.

## 2018-05-08 NOTE — CARE PLAN
Problem: Nutritional:  Goal: Achieve adequate nutritional intake  Patient will consume >75% of meals of regular diet   Outcome: MET Date Met: 05/08/18  Great PO intake, consuming mostly % of meals per ADLs     Intervention: Monitor PO intake, weights, and laboratory values  Please con't tp record percentage of all po intake conusmed in ADLs to help monitor po adequacy      Intervention: Nutrition representative to see  Nutrition rep will con't to see pt daily for meal preferences   Intervention: Collaborate with transitional care team and interdisciplinary team to meet patient's needs  RD will con't to monitor per dept policy

## 2018-05-08 NOTE — PROGRESS NOTES
Patient states after administration of clindamycin, she feels nauseated, but no emesis.  Compazine administered per MAR.

## 2018-05-22 ENCOUNTER — HOSPITAL ENCOUNTER (OUTPATIENT)
Dept: RADIOLOGY | Facility: MEDICAL CENTER | Age: 23
End: 2018-05-22
Attending: STUDENT IN AN ORGANIZED HEALTH CARE EDUCATION/TRAINING PROGRAM
Payer: MEDICAID

## 2018-05-22 DIAGNOSIS — J86.9 EMPYEMA OF LEFT PLEURAL SPACE (HCC): ICD-10-CM

## 2018-05-22 DIAGNOSIS — J90 PLEURAL EFFUSION: ICD-10-CM

## 2018-05-22 DIAGNOSIS — J18.9 PNEUMONIA OF LEFT LOWER LOBE DUE TO INFECTIOUS ORGANISM: ICD-10-CM

## 2018-05-22 PROCEDURE — 71046 X-RAY EXAM CHEST 2 VIEWS: CPT

## 2018-05-29 RX ORDER — CLINDAMYCIN HYDROCHLORIDE 300 MG/1
300 CAPSULE ORAL 3 TIMES DAILY
Qty: 63 CAP | Status: CANCELLED | OUTPATIENT
Start: 2018-05-29 | End: 2018-06-19

## 2018-05-29 NOTE — TELEPHONE ENCOUNTER
Spoke to patient ,states symptoms are improving and she is going to finish antibiotic course in next few days. Told the patient to follow up with PCP or our clinic soon to get evaluated/followed up/assessed for the appointment to make a decision regarding continuing antibiotics.

## 2018-05-29 NOTE — TELEPHONE ENCOUNTER
PT SEEN: None   Was the patient seen in the last year in this department? Yes     Does patient have an active prescription for medications requested? No     Received Request Via: Patient

## 2018-06-06 ENCOUNTER — OFFICE VISIT (OUTPATIENT)
Dept: INTERNAL MEDICINE | Facility: MEDICAL CENTER | Age: 23
End: 2018-06-06

## 2018-06-06 VITALS
BODY MASS INDEX: 19.21 KG/M2 | WEIGHT: 108.4 LBS | OXYGEN SATURATION: 98 % | SYSTOLIC BLOOD PRESSURE: 126 MMHG | HEART RATE: 83 BPM | TEMPERATURE: 98.6 F | HEIGHT: 63 IN | DIASTOLIC BLOOD PRESSURE: 67 MMHG

## 2018-06-06 DIAGNOSIS — D50.9 IRON DEFICIENCY ANEMIA, UNSPECIFIED IRON DEFICIENCY ANEMIA TYPE: ICD-10-CM

## 2018-06-06 DIAGNOSIS — Z09 HOSPITAL DISCHARGE FOLLOW-UP: ICD-10-CM

## 2018-06-06 DIAGNOSIS — Z00.00 HEALTH MAINTENANCE EXAMINATION: ICD-10-CM

## 2018-06-06 PROBLEM — D64.9 ANEMIA: Status: ACTIVE | Noted: 2018-06-06

## 2018-06-06 PROBLEM — J86.9 EMPYEMA OF LEFT PLEURAL SPACE (HCC): Status: RESOLVED | Noted: 2018-05-01 | Resolved: 2018-06-06

## 2018-06-06 PROCEDURE — 99203 OFFICE O/P NEW LOW 30 MIN: CPT | Mod: GE | Performed by: INTERNAL MEDICINE

## 2018-06-06 ASSESSMENT — PAIN SCALES - GENERAL: PAINLEVEL: 5=MODERATE PAIN

## 2018-06-06 NOTE — LETTER
June 6, 2018    To Whom It May Concern:         This is confirmation that Maritza Joannekeely Swann attended her scheduled appointment with Ignacio Resendiz M.D. on 6/06/18.  Patient can return to work.        If you have any questions please do not hesitate to call me at the phone number listed below.    Sincerely,          Ignacio Resendiz M.D.  [phone]

## 2018-06-06 NOTE — PROGRESS NOTES
New Patient to Establish    Reason to establish: New patient to establish and hospital follow up     CC: Establishing care    HPI: Maritza Swann is a 22 y.o. female with past medical history of MRSA pneumonia and empyema presented to the clinic for follow-up and to establish care.  She was admitted to the hospital in May 1, 2018 for empyema and MRSA pneumonia, empyema was dry and with decortication and she was treated with 4 weeks of antibiotics, stop her antibiotics 1 week ago, recheck x-ray done 2 days ago showed improvement, patient still complaining of pain over the right side of the chest and mild exertional dyspnea. Denies fever, chills, sweating, hemoptysis, cough and palpitation.          Patient Active Problem List    Diagnosis Date Noted   • Empyema of left pleural space (HCC) 05/01/2018     Priority: Medium   • Postpartum care and examination of lactating mother 09/07/2016   • Mild intermittent asthma without complication 02/02/2016   • Migraines 02/02/2016       Past Medical History:   Diagnosis Date   • Asthma     as a child   • Depression    • Endometriosis 2014   • Migraine    • Other specified symptom associated with female genital organs     endometriosis, ovarian cysts   • Ovarian cyst 2014       No current outpatient prescriptions on file.     No current facility-administered medications for this visit.        Allergies as of 06/06/2018 - Reviewed 06/06/2018   Allergen Reaction Noted   • Iron sucrose Rash and Itching 05/05/2018       Social History     Social History   • Marital status: Single     Spouse name: N/A   • Number of children: N/A   • Years of education: N/A     Occupational History   • Not on file.     Social History Main Topics   • Smoking status: Never Smoker   • Smokeless tobacco: Never Used   • Alcohol use No   • Drug use: No   • Sexual activity: Yes     Partners: Male      Comment: none     Other Topics Concern   • Not on file     Social History Narrative   • No narrative  on file       Family History   Problem Relation Age of Onset   • Hypertension Father    • Bipolar disorder Sister    • Other Sister      mental disorder.   • Asthma Brother    • Arthritis Maternal Grandmother    • Diabetes Maternal Grandmother    • Bipolar disorder Maternal Grandfather    • Other Maternal Grandfather      mental disorder.   • Diabetes     • Hypertension         Past Surgical History:   Procedure Laterality Date   • THORACOSCOPY Left 5/2/2018    Procedure: THORACOSCOPY WITH DECORT;  Surgeon: Yimi Rivera M.D.;  Location: SURGERY [facility];  Service: General   • MAMMOPLASTY AUGMENTATION Bilateral 5/27/2015    Procedure: MAMMOPLASTY AUGMENTATION-SALINE;  Surgeon: Prince Lyn M.D.;  Location: SURGERY [facility];  Service:    • LAPAROSCOPY  7/20/2014   • DENTAL EXTRACTION(S)  2008    wisdom teeth       ROS: As per HPI. Additional pertinent symptoms as noted below.  Review of Systems   Constitutional: Negative for fever, chills, weight loss, malaise/fatigue and diaphoresis.   HENT: Negative for ear discharge, ear pain, hearing loss and tinnitus.    Eyes: Negative for blurred vision, double vision, pain, discharge and redness.   Respiratory: Negative for cough, hemoptysis, sputum production and wheezing.   positive for exertional dyspnea and chest pain  Cardiovascular: Negative for palpitations, orthopnea, leg swelling and PND.   Gastrointestinal: Negative for heartburn, nausea, vomiting, abdominal pain, diarrhea, constipation and blood in stool.   Genitourinary: Negative for dysuria, urgency, frequency, hematuria and flank pain.   Musculoskeletal: Negative for myalgias, back pain, joint pain and neck pain.   Skin: Negative for itching and rash.   Neurological: Negative for dizziness, tingling, tremors, sensory change, focal weakness, loss of consciousness, weakness and headaches.   Psychiatric/Behavioral: Negative for depression and suicidal ideas. The patient is not nervous/anxious  "and does not have insomnia.        /67   Pulse 83   Temp 37 °C (98.6 °F)   Ht 1.6 m (5' 3\")   Wt 49.2 kg (108 lb 6.4 oz)   LMP 05/26/2018   SpO2 98%   Breastfeeding? No   BMI 19.20 kg/m²     Physical Exam  Physical Exam   Constitutional: Oriented to person, place, and time and well-developed, well-nourished, and in no distress. Vital signs are normal.   HENT:   Head: Normocephalic and atraumatic.   Mouth/Throat: Oropharynx is clear and moist.   Eyes: Conjunctivae are normal.   Neck: Neck supple. No JVD present. No tracheal deviation present.   Cardiovascular: Normal rate.  Exam reveals no gallop and no friction rub.    No murmur heard.  Pulmonary/Chest: Effort normal and breath sounds normal. No respiratory distress. She has no wheezes. She has no rales. She exhibits tenderness over the lateral right chest around the chest tube insertion site.   Abdominal: Soft. Bowel sounds are normal. She exhibits no mass. There is no tenderness. There is no rebound and no guarding.   Musculoskeletal: Normal range of motion. She exhibits no edema.   Lymphadenopathy:     She has no cervical adenopathy.   Neurological: She is alert and oriented to person, place, and time. She has normal strength and normal reflexes. Gait normal. GCS score is 15.   Skin: No rash noted. No erythema. No pallor.     Assessment and Plan    1. Hospital discharge follow-up  Hx: MRSA pneumonia with empyema diagnosed 6 weeks ago treated with antibiotics for 4 weeks, patient condition improving, still complaining of mild pain around chest tube insertion site and mild exertional dyspnea. No suspicion of active infection as patient denies cough, fever, chills and hemoptysis  Imaging: Chest x-ray 2 days ago showed significant improvement   Plan:  - Patient condition improving, infection resolved, will not repeat chest x-ray as the recent chest x-ray showed significant improvement.  - Patient was anemic in the hospital, received IV iron, normal " recent CBC, patient insurance application is pending.  - Ordered CBC to rule out anemia as the cause of shortness of breath.  - Follow-up in 4 weeks      2. Health maintenance examination  - Colon cancer colonoscopy screening: Not indicated at this age  - Breast cancer mammography screening: Not indicated at this age  - Cervical cancer PAP smear screening: need schedule to appointment.   - Lung cancer screening: Not indicated  - AAA screening: Not indicated  - Osteoporosis screening: Not indicated  - HIV screening: done and was negative   - flu vaccine: recommended next season   - Tdap: recommended   - PNA vac: Not indicated  - Basic Labs within the last 12 months:CBC    - Vit D: not recommended   - Shingles vac: Not indicated           Followup: Return in about 4 weeks (around 7/4/2018).       Signed by: Ignacio Resendiz M.D.

## 2018-06-06 NOTE — PATIENT INSTRUCTIONS
- please schedule appointments for tetanus vaccine, flu shot and PAP smear   - please do the labs      Community-Acquired Pneumonia, Adult  Introduction  Pneumonia is an infection of the lungs. One type of pneumonia can happen while a person is in a hospital. A different type can happen when a person is not in a hospital (community-acquired pneumonia). It is easy for this kind to spread from person to person. It can spread to you if you breathe near an infected person who coughs or sneezes. Some symptoms include:  · A dry cough.  · A wet (productive) cough.  · Fever.  · Sweating.  · Chest pain.  Follow these instructions at home:  · Take over-the-counter and prescription medicines only as told by your doctor.  ¨ Only take cough medicine if you are losing sleep.  ¨ If you were prescribed an antibiotic medicine, take it as told by your doctor. Do not stop taking the antibiotic even if you start to feel better.  · Sleep with your head and neck raised (elevated). You can do this by putting a few pillows under your head, or you can sleep in a recliner.  · Do not use tobacco products. These include cigarettes, chewing tobacco, and e-cigarettes. If you need help quitting, ask your doctor.  · Drink enough water to keep your pee (urine) clear or pale yellow.  A shot (vaccine) can help prevent pneumonia. Shots are often suggested for:  · People older than 65 years of age.  · People older than 19 years of age:  ¨ Who are having cancer treatment.  ¨ Who have long-term (chronic) lung disease.  ¨ Who have problems with their body's defense system (immune system).  You may also prevent pneumonia if you take these actions:  · Get the flu (influenza) shot every year.  · Go to the dentist as often as told.  · Wash your hands often. If soap and water are not available, use hand .  Contact a doctor if:  · You have a fever.  · You lose sleep because your cough medicine does not help.  Get help right away if:  · You are short of  breath and it gets worse.  · You have more chest pain.  · Your sickness gets worse. This is very serious if:  ¨ You are an older adult.  ¨ Your body's defense system is weak.  · You cough up blood.  This information is not intended to replace advice given to you by your health care provider. Make sure you discuss any questions you have with your health care provider.  Document Released: 06/05/2009 Document Revised: 05/25/2017 Document Reviewed: 04/13/2016  © 2017 [employer]

## 2018-06-26 ENCOUNTER — HOSPITAL ENCOUNTER (OUTPATIENT)
Dept: LAB | Facility: MEDICAL CENTER | Age: 23
End: 2018-06-26
Attending: STUDENT IN AN ORGANIZED HEALTH CARE EDUCATION/TRAINING PROGRAM
Payer: MEDICAID

## 2018-06-26 DIAGNOSIS — Z09 HOSPITAL DISCHARGE FOLLOW-UP: ICD-10-CM

## 2018-06-29 ENCOUNTER — APPOINTMENT (OUTPATIENT)
Dept: INTERNAL MEDICINE | Facility: MEDICAL CENTER | Age: 23
End: 2018-06-29

## 2018-07-18 ENCOUNTER — OFFICE VISIT (OUTPATIENT)
Dept: INTERNAL MEDICINE | Facility: MEDICAL CENTER | Age: 23
End: 2018-07-18
Payer: MEDICAID

## 2018-07-18 VITALS
DIASTOLIC BLOOD PRESSURE: 60 MMHG | HEART RATE: 68 BPM | RESPIRATION RATE: 20 BRPM | OXYGEN SATURATION: 98 % | TEMPERATURE: 98.4 F | BODY MASS INDEX: 19.14 KG/M2 | HEIGHT: 63 IN | SYSTOLIC BLOOD PRESSURE: 112 MMHG | WEIGHT: 108 LBS

## 2018-07-18 DIAGNOSIS — Z12.4 ROUTINE CERVICAL SMEAR: ICD-10-CM

## 2018-07-18 DIAGNOSIS — Z30.011 ENCOUNTER FOR INITIAL PRESCRIPTION OF CONTRACEPTIVE PILLS: ICD-10-CM

## 2018-07-18 DIAGNOSIS — Z01.419 PAP SMEAR, LOW-RISK: ICD-10-CM

## 2018-07-18 DIAGNOSIS — Z11.3 SCREEN FOR STD (SEXUALLY TRANSMITTED DISEASE): ICD-10-CM

## 2018-07-18 PROCEDURE — 99000 SPECIMEN HANDLING OFFICE-LAB: CPT | Mod: GC | Performed by: INTERNAL MEDICINE

## 2018-07-18 PROCEDURE — 99395 PREV VISIT EST AGE 18-39: CPT | Mod: EP,GC | Performed by: INTERNAL MEDICINE

## 2018-07-18 RX ORDER — NORETHINDRONE ACETATE AND ETHINYL ESTRADIOL 1MG-20(21)
1 KIT ORAL DAILY
Qty: 28 TAB | Refills: 5 | Status: SHIPPED | OUTPATIENT
Start: 2018-07-18 | End: 2022-02-11

## 2018-07-18 ASSESSMENT — PAIN SCALES - GENERAL: PAINLEVEL: NO PAIN
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
## 2018-07-18 NOTE — PATIENT INSTRUCTIONS
- please take the Tdap vaccine from the pharmacy.  - please do the pernancy test   - please start taking your contraception pills the first day of your next period    Pap Test  A Pap test is a procedure done in a clinic office to evaluate cells that are on the surface of the cervix. The cervix is the lower portion of the uterus and upper portion of the vagina. For some women, the cervical region has the potential to form cancer. With consistent evaluations by your caregiver, this type of cancer can be prevented.   If a Pap test is abnormal, it is most often a result of a previous exposure to human papillomavirus (HPV). HPV is a virus that can infect the cells of the cervix and cause dysplasia. Dysplasia is where the cells no longer look normal. If a woman has been diagnosed with high-grade or severe dysplasia, they are at higher risk of developing cervical cancer. People diagnosed with low-grade dysplasia should still be seen by their caregiver because there is a small chance that low-grade dysplasia could develop into cancer.   LET YOUR CAREGIVER KNOW ABOUT:  · Recent sexually transmitted infection (STI) you have had.  · Any new sex partners you have had.  · History of previous abnormal Pap tests results.  · History of previous cervical procedures you have had (colposcopy, biopsy, loop electrosurgical excision procedure [LEEP]).  · Concerns you have had regarding unusual vaginal discharge.  · History of pelvic pain.  · Your use of birth control.  BEFORE THE PROCEDURE  · Ask your caregiver when to schedule your Pap test. It is best not to be on your period if your caregiver uses a wooden spatula to collect cells or applies cells to a glass slide. Newer techniques are not so sensitive to the timing of a menstrual cycle.  · Do not douche or have sexual intercourse for 24 hours before the test.    · Do not use vaginal creams or tampons for 24 hours before the test.    · Empty your bladder just before the test to lessen  any discomfort.    PROCEDURE  You will lie on an exam table with your feet in stirrups. A warm metal or plastic instrument (speculum) is placed in your vagina. This instrument allows your caregiver to see the inside of your vagina and look at your cervix. A small, plastic brush or wooden spatula is then used to collect cervical cells. These cells are placed in a lab specimen container. The cells are looked at under a microscope. A specialist will determine if the cells are normal.   AFTER THE PROCEDURE  Make sure to get your test results. If your results come back abnormal, you may need further testing.   Document Released: 03/09/2004 Document Revised: 03/11/2013 Document Reviewed: 12/13/2012  [employer]® Patient Information ©2014 [employer], [employer].

## 2018-07-25 ENCOUNTER — OFFICE VISIT (OUTPATIENT)
Dept: INTERNAL MEDICINE | Facility: MEDICAL CENTER | Age: 23
End: 2018-07-25
Payer: MEDICAID

## 2018-07-25 ENCOUNTER — TELEPHONE (OUTPATIENT)
Dept: INTERNAL MEDICINE | Facility: MEDICAL CENTER | Age: 23
End: 2018-07-25

## 2018-07-25 VITALS
RESPIRATION RATE: 16 BRPM | DIASTOLIC BLOOD PRESSURE: 71 MMHG | WEIGHT: 120 LBS | HEIGHT: 63 IN | HEART RATE: 56 BPM | BODY MASS INDEX: 21.26 KG/M2 | SYSTOLIC BLOOD PRESSURE: 117 MMHG | OXYGEN SATURATION: 99 % | TEMPERATURE: 97.7 F

## 2018-07-25 DIAGNOSIS — Z12.4 ROUTINE CERVICAL SMEAR: ICD-10-CM

## 2018-07-25 DIAGNOSIS — A74.9 CHLAMYDIA: ICD-10-CM

## 2018-07-25 DIAGNOSIS — Z01.419 PAP SMEAR, LOW-RISK: ICD-10-CM

## 2018-07-25 PROCEDURE — 99213 OFFICE O/P EST LOW 20 MIN: CPT | Mod: GE | Performed by: INTERNAL MEDICINE

## 2018-07-25 RX ORDER — AZITHROMYCIN 500 MG/1
1000 TABLET, FILM COATED ORAL ONCE
Qty: 2 TAB | Refills: 0 | Status: SHIPPED | OUTPATIENT
Start: 2018-07-25 | End: 2018-07-25

## 2018-07-25 NOTE — TELEPHONE ENCOUNTER
----- Message from Catrachita Molina sent at 7/24/2018  8:51 AM PDT -----  Regarding: Dr Garber/does pt need appt or can a rx be called in  Contact: [phone]  Patient states she received a message on my chart from you regarding her results on her tests and wants to know if she needs an appointment to come in or if prescriptions can just be called into [facility] on [address]. Please call patient to let patient know what's going on.

## 2018-07-25 NOTE — TELEPHONE ENCOUNTER
Patient was seen today in the clinic she need to be treated with her sexual partner and 7 days sexual abstained

## 2018-07-25 NOTE — PROGRESS NOTES
"Established Patient    Maritza presents today with the following:    CC: Treatment for Chlamydia    HPI: 22 year old female presents to the clinic today for treatment of her recently positive Chlamydia swab. Patient underwent a routine PAP on 7/18/2018 and at that time was swabbed for Chlamydia and Gonorrhea. Documentation from that visit had no symptoms, but patient would like to receive treatment for her positive Chlamydia test. She has been sexually active, unprotected with 1 male partner. Patient denies any fever, chills.    Patient Active Problem List    Diagnosis Date Noted   • Anemia 06/06/2018   • Postpartum care and examination of lactating mother 09/07/2016   • Mild intermittent asthma without complication 02/02/2016   • Migraines 02/02/2016       Current Outpatient Prescriptions   Medication Sig Dispense Refill   • azithromycin (ZITHROMAX) 500 MG tablet Take 2 Tabs by mouth Once for 1 dose. 2 Tab 0   • norethindrone-ethinyl estradiol (JUNEL FE 1/20) 1-20 MG-MCG per tablet Take 1 Tab by mouth every day. 28 Tab 5     No current facility-administered medications for this visit.        ROS: As per HPI. Additional pertinent symptoms as noted below.    /71   Pulse (!) 56   Temp 36.5 °C (97.7 °F)   Resp 16   Ht 1.6 m (5' 3\")   Wt 54.4 kg (120 lb)   SpO2 99%   BMI 21.26 kg/m²     Physical Exam   Constitutional:  oriented to person, place, and time. No distress.   Eyes: Pupils are equal, round, and reactive to light. No scleral icterus.  Neck: Neck supple. No thyromegaly present.   Cardiovascular: Normal rate, regular rhythm and normal heart sounds.  Exam reveals no gallop and no friction rub.  No murmur heard.  Pulmonary/Chest: Breath sounds normal. Chest wall is not dull to percussion.   Musculoskeletal:   no edema.   Lymphadenopathy: no cervical adenopathy  Neurological: alert and oriented to person, place, and time.   Skin: No cyanosis. Nails show no clubbing.        Assessment and Plan    1. " Chlamydia  - Treat with 1000mg azithromycin once  - Informed partners need treatment  - Counseled barrier protection  - Follow up with PCP PRN      Signed by: Isak Rizzo M.D.

## 2018-08-09 ENCOUNTER — OFFICE VISIT (OUTPATIENT)
Dept: INTERNAL MEDICINE | Facility: MEDICAL CENTER | Age: 23
End: 2018-08-09
Payer: MEDICAID

## 2018-08-09 VITALS
HEART RATE: 58 BPM | BODY MASS INDEX: 21.58 KG/M2 | HEIGHT: 63 IN | DIASTOLIC BLOOD PRESSURE: 80 MMHG | TEMPERATURE: 98.6 F | OXYGEN SATURATION: 96 % | WEIGHT: 121.8 LBS | SYSTOLIC BLOOD PRESSURE: 120 MMHG

## 2018-08-09 DIAGNOSIS — N89.8 VAGINAL DISCHARGE: ICD-10-CM

## 2018-08-09 DIAGNOSIS — Z72.51 HIGH RISK SEXUAL BEHAVIOR: ICD-10-CM

## 2018-08-09 LAB
APPEARANCE UR: CLEAR
BILIRUB UR STRIP-MCNC: NEGATIVE MG/DL
COLOR UR AUTO: YELLOW
GLUCOSE UR STRIP.AUTO-MCNC: NEGATIVE MG/DL
KETONES UR STRIP.AUTO-MCNC: NEGATIVE MG/DL
LEUKOCYTE ESTERASE UR QL STRIP.AUTO: NEGATIVE
NITRITE UR QL STRIP.AUTO: NEGATIVE
PH UR STRIP.AUTO: 7 [PH] (ref 5–8)
PROT UR QL STRIP: NEGATIVE MG/DL
RBC UR QL AUTO: NEGATIVE
SP GR UR STRIP.AUTO: 1.02
UROBILINOGEN UR STRIP-MCNC: 0.2 MG/DL

## 2018-08-09 PROCEDURE — 81002 URINALYSIS NONAUTO W/O SCOPE: CPT | Performed by: INTERNAL MEDICINE

## 2018-08-09 PROCEDURE — 99214 OFFICE O/P EST MOD 30 MIN: CPT | Performed by: INTERNAL MEDICINE

## 2018-08-09 RX ORDER — DOXYCYCLINE HYCLATE 100 MG
100 TABLET ORAL 2 TIMES DAILY
Qty: 14 TAB | Refills: 0 | Status: SHIPPED
Start: 2018-08-09 | End: 2022-02-11

## 2018-08-09 NOTE — PROGRESS NOTES
Established Patient    Ms. Swannis a 22 y.o. female who presents today with:  CC: Follow-Up (chlanydia and pill that was given feels like has not been working)        Assessment and Plan    1.  Vaginal discharge-patient was recently treated over 1 week ago with azithromycin 1 g p.o. ×1 for positive chlamydia she states that she currently has symptoms of left pelvic pain and cramping.  Also she has slight vaginal discharge.  Deferring pelvic exam today.  Although azithromycin 1000 mg ×1 has a effective cure rate, it is reasonable to treat with doxycycline for 7 days and some failures reported with azithromycin for treatment of chlamydia..  UA was negative for cystitis today.  We discussed the risks of other STDs and she is concerned.  We will also send labs for repeat chlamydia, gonorrhea, trichomonas, Gardnerella and HIV and RPR    Current Outpatient Prescriptions   Medication Sig Dispense Refill   • norethindrone-ethinyl estradiol (JUNEL FE 1/20) 1-20 MG-MCG per tablet Take 1 Tab by mouth every day. 28 Tab 5     No current facility-administered medications for this visit.          followup No Follow-up on file.    This note was created using voice recognition software (Dragon). The accuracy of the dictation is limited by the abilities of the software. I have reviewed the note prior to signing, however some errors in grammar and context are still possible. If you have any questions related to this note please do not hesitate to contact our office.   _______________________________________________________    HPI:   Ms. Swann is a 22-year-old woman who was tested for STDs July 18, 2018.  She had a routine Pap smear and because of a history of a new sexual partner wanted to be screened for STDs.  At that time she had no symptoms.  Pelvic swab was positive for chlamydia.  Patient was treated with 1 dose of 1000 azithromycin July 25.  She was compliant with the treatment.  She has not had sexual activity since  "then.  Currently has dysuria. Constant cramps in the left pelvis. Cramps in lower back. More discharge than normal. Thin and clear but different.  She informed her partner of the chlamydia diagnosis but she is unsure if he was treated or not.  She is no longer seeing him.. No intercourse since treatment. Started a new BCP the same week of treatment.      has a past medical history of Asthma; Depression; Endometriosis (2014); Migraine; Other specified symptom associated with female genital organs; and Ovarian cyst (2014).     reports that she has never smoked. She has never used smokeless tobacco. She reports that she does not drink alcohol or use drugs.      ROS: Pertinent positives as stated in HPI, all others reviewed as negative:  Constitutional ROS: No fatigue, No unexplained fevers, sweats, or chills  Denies any vaginal bleeding.      Physical Exam  /80   Pulse (!) 58   Temp 37 °C (98.6 °F)   Ht 1.6 m (5' 3\")   Wt 55.2 kg (121 lb 12.8 oz)   SpO2 96%   BMI 21.58 kg/m²   Constitutional:  oriented to person, place, and time. No distress.   \  Other: Abdomen: Soft tenderness in the left lower pelvis to palpation.  She wants to defer pelvic exam today        Current Outpatient Prescriptions on File Prior to Visit   Medication Sig Dispense Refill   • norethindrone-ethinyl estradiol (JUNEL FE 1/20) 1-20 MG-MCG per tablet Take 1 Tab by mouth every day. 28 Tab 5     No current facility-administered medications on file prior to visit.            Signed by: Felix Rockwell M.D.  "

## 2018-08-13 DIAGNOSIS — Z72.51 HIGH RISK SEXUAL BEHAVIOR: ICD-10-CM

## 2018-08-13 DIAGNOSIS — N89.8 VAGINAL DISCHARGE: ICD-10-CM

## 2018-09-17 ENCOUNTER — APPOINTMENT (OUTPATIENT)
Dept: RADIOLOGY | Facility: MEDICAL CENTER | Age: 23
End: 2018-09-17
Attending: EMERGENCY MEDICINE
Payer: MEDICAID

## 2018-09-17 ENCOUNTER — HOSPITAL ENCOUNTER (EMERGENCY)
Facility: MEDICAL CENTER | Age: 23
End: 2018-09-17
Attending: EMERGENCY MEDICINE
Payer: MEDICAID

## 2018-09-17 VITALS
RESPIRATION RATE: 16 BRPM | OXYGEN SATURATION: 98 % | BODY MASS INDEX: 25.27 KG/M2 | WEIGHT: 142.64 LBS | DIASTOLIC BLOOD PRESSURE: 70 MMHG | TEMPERATURE: 98.3 F | HEART RATE: 59 BPM | HEIGHT: 63 IN | SYSTOLIC BLOOD PRESSURE: 130 MMHG

## 2018-09-17 DIAGNOSIS — R09.1 PLEURISY: ICD-10-CM

## 2018-09-17 DIAGNOSIS — R05.9 COUGH: ICD-10-CM

## 2018-09-17 LAB
ANION GAP SERPL CALC-SCNC: 8 MMOL/L (ref 0–11.9)
BASOPHILS # BLD AUTO: 0.6 % (ref 0–1.8)
BASOPHILS # BLD: 0.04 K/UL (ref 0–0.12)
BUN SERPL-MCNC: 17 MG/DL (ref 8–22)
CALCIUM SERPL-MCNC: 9.7 MG/DL (ref 8.5–10.5)
CHLORIDE SERPL-SCNC: 104 MMOL/L (ref 96–112)
CO2 SERPL-SCNC: 26 MMOL/L (ref 20–33)
CREAT SERPL-MCNC: 0.74 MG/DL (ref 0.5–1.4)
EOSINOPHIL # BLD AUTO: 0.13 K/UL (ref 0–0.51)
EOSINOPHIL NFR BLD: 1.9 % (ref 0–6.9)
ERYTHROCYTE [DISTWIDTH] IN BLOOD BY AUTOMATED COUNT: 46.2 FL (ref 35.9–50)
GLUCOSE SERPL-MCNC: 80 MG/DL (ref 65–99)
HCT VFR BLD AUTO: 41.3 % (ref 37–47)
HGB BLD-MCNC: 13.7 G/DL (ref 12–16)
IMM GRANULOCYTES # BLD AUTO: 0.02 K/UL (ref 0–0.11)
IMM GRANULOCYTES NFR BLD AUTO: 0.3 % (ref 0–0.9)
LYMPHOCYTES # BLD AUTO: 2.55 K/UL (ref 1–4.8)
LYMPHOCYTES NFR BLD: 37.9 % (ref 22–41)
MCH RBC QN AUTO: 31.5 PG (ref 27–33)
MCHC RBC AUTO-ENTMCNC: 33.2 G/DL (ref 33.6–35)
MCV RBC AUTO: 94.9 FL (ref 81.4–97.8)
MONOCYTES # BLD AUTO: 0.5 K/UL (ref 0–0.85)
MONOCYTES NFR BLD AUTO: 7.4 % (ref 0–13.4)
NEUTROPHILS # BLD AUTO: 3.49 K/UL (ref 2–7.15)
NEUTROPHILS NFR BLD: 51.9 % (ref 44–72)
NRBC # BLD AUTO: 0 K/UL
NRBC BLD-RTO: 0 /100 WBC
PLATELET # BLD AUTO: 219 K/UL (ref 164–446)
PMV BLD AUTO: 11.7 FL (ref 9–12.9)
POTASSIUM SERPL-SCNC: 3.9 MMOL/L (ref 3.6–5.5)
RBC # BLD AUTO: 4.35 M/UL (ref 4.2–5.4)
SODIUM SERPL-SCNC: 138 MMOL/L (ref 135–145)
WBC # BLD AUTO: 6.7 K/UL (ref 4.8–10.8)

## 2018-09-17 PROCEDURE — 700101 HCHG RX REV CODE 250: Performed by: EMERGENCY MEDICINE

## 2018-09-17 PROCEDURE — 80048 BASIC METABOLIC PNL TOTAL CA: CPT

## 2018-09-17 PROCEDURE — 700111 HCHG RX REV CODE 636 W/ 250 OVERRIDE (IP): Performed by: EMERGENCY MEDICINE

## 2018-09-17 PROCEDURE — 85025 COMPLETE CBC W/AUTO DIFF WBC: CPT

## 2018-09-17 PROCEDURE — 94640 AIRWAY INHALATION TREATMENT: CPT

## 2018-09-17 PROCEDURE — 99284 EMERGENCY DEPT VISIT MOD MDM: CPT

## 2018-09-17 PROCEDURE — 71046 X-RAY EXAM CHEST 2 VIEWS: CPT

## 2018-09-17 PROCEDURE — 96374 THER/PROPH/DIAG INJ IV PUSH: CPT

## 2018-09-17 RX ORDER — KETOROLAC TROMETHAMINE 30 MG/ML
30 INJECTION, SOLUTION INTRAMUSCULAR; INTRAVENOUS ONCE
Status: COMPLETED | OUTPATIENT
Start: 2018-09-17 | End: 2018-09-17

## 2018-09-17 RX ORDER — IBUPROFEN 600 MG/1
600 TABLET ORAL EVERY 6 HOURS PRN
Qty: 30 TAB | Refills: 0 | Status: SHIPPED | OUTPATIENT
Start: 2018-09-17 | End: 2022-02-11

## 2018-09-17 RX ORDER — ALBUTEROL SULFATE 90 UG/1
2 AEROSOL, METERED RESPIRATORY (INHALATION) EVERY 6 HOURS PRN
Qty: 8.5 G | Refills: 0 | Status: SHIPPED | OUTPATIENT
Start: 2018-09-17 | End: 2022-02-11

## 2018-09-17 RX ORDER — BENZONATATE 100 MG/1
100 CAPSULE ORAL 3 TIMES DAILY PRN
Qty: 30 CAP | Refills: 0 | Status: SHIPPED | OUTPATIENT
Start: 2018-09-17 | End: 2022-02-11

## 2018-09-17 RX ADMIN — ALBUTEROL SULFATE 5 MG: 2.5 SOLUTION RESPIRATORY (INHALATION) at 21:41

## 2018-09-17 RX ADMIN — KETOROLAC TROMETHAMINE 30 MG: 30 INJECTION, SOLUTION INTRAMUSCULAR; INTRAVENOUS at 21:37

## 2018-09-17 ASSESSMENT — LIFESTYLE VARIABLES: DO YOU DRINK ALCOHOL: NO

## 2018-09-17 ASSESSMENT — PAIN SCALES - GENERAL: PAINLEVEL_OUTOF10: 5

## 2018-09-18 ENCOUNTER — PATIENT OUTREACH (OUTPATIENT)
Dept: HEALTH INFORMATION MANAGEMENT | Facility: OTHER | Age: 23
End: 2018-09-18

## 2018-09-18 NOTE — ED TRIAGE NOTES
Chief Complaint   Patient presents with   • Cough     x2 weeks. Nonproductive   • Back Pain     left sided, worse when coughing.      Ambulatory to triage for above. VSS. Afebrile. Explained triage process, to waiting room. Asked to inform RN if questions or concerns arise.

## 2018-09-18 NOTE — ED PROVIDER NOTES
ED Provider Note    Scribed for Marcio Dobbs M.D. by Agatha Lilly. 9/17/2018, 8:45 PM.    Primary care provider: Ignacio Resendiz M.D.  Means of arrival: walk in   History obtained from: patient   History limited by: none     CHIEF COMPLAINT  Chief Complaint   Patient presents with   • Cough     x2 weeks. Nonproductive   • Back Pain     left sided, worse when coughing.        TAZ Swann is a 22 y.o. female who presents to the Emergency Department for evaluation of a cough which began 2 weeks ago. Patient reports associated intermittent fevers, shortness of breath, left sided chest pain, left sided back pain, nausea, and vomiting. Her pain is exacerbated with cough and deep inhalation. Patient states her symptoms began as cold symptoms, however, she has a history of pneumonia with pleural effusion in May, 2018 and is concerned for she may be developing pneumonia again. No history of asthma. No history of PE or DVT. No complaints of sputum production, leg pain or leg swelling, and dysuria.     REVIEW OF SYSTEMS  Pertinent positives include cough, fevers, shortness of breath, left sided chest pain, left sided back pain, nausea, vomiting. Pertinent negatives include sputum production, leg pain or leg swelling, dysuria. All other systems negative.    PAST MEDICAL HISTORY   has a past medical history of Asthma; Depression; Endometriosis (2014); Migraine; Other specified symptom associated with female genital organs; and Ovarian cyst (2014).    SURGICAL HISTORY   has a past surgical history that includes dental extraction(s) (2008); laparoscopy (7/20/2014); mammoplasty augmentation (Bilateral, 5/27/2015); and thoracoscopy (Left, 5/2/2018).    SOCIAL HISTORY  Social History   Substance Use Topics   • Smoking status: Never Smoker   • Smokeless tobacco: Never Used   • Alcohol use No      History   Drug Use No       FAMILY HISTORY  Family History   Problem Relation Age of Onset   • Hypertension  "Father    • Bipolar disorder Sister    • Other Sister         mental disorder.   • Asthma Brother    • Arthritis Maternal Grandmother    • Diabetes Maternal Grandmother    • Bipolar disorder Maternal Grandfather    • Other Maternal Grandfather         mental disorder.   • Diabetes Unknown    • Hypertension Unknown        CURRENT MEDICATIONS  Home Medications     Reviewed by Barbara Yuan R.N. (Registered Nurse) on 09/17/18 at 2042  Med List Status: Not Addressed   Medication Last Dose Status   doxycycline (VIBRAMYCIN) 100 MG Tab  Active   norethindrone-ethinyl estradiol (JUNEL FE 1/20) 1-20 MG-MCG per tablet 8/9/2018 Active                ALLERGIES  Allergies   Allergen Reactions   • Iron Sucrose Rash and Itching     Face, neck, arm rash with injection       PHYSICAL EXAM  VITAL SIGNS: /64   Pulse 82   Temp 36.8 °C (98.3 °F)   Resp 16   Ht 1.6 m (5' 3\")   Wt 64.7 kg (142 lb 10.2 oz)   SpO2 98%   BMI 25.27 kg/m²     Constitutional: Well developed, Well nourished, mild distress.   HENT: Normocephalic, Atraumatic.   Eyes: Conjunctiva normal, No discharge.   Neck: Supple, No stridor.   Cardiovascular: Normal heart rate, Normal rhythm, No murmurs, equal pulses.   Pulmonary: Normal breath sounds, No respiratory distress, No wheezing, No rales, No rhonchi.  Chest: Reproducible pain with palpation to her left lower chest.   Abdomen:Soft, No tenderness, No masses, no rebound, no guarding.   Back: No CVA tenderness.   Musculoskeletal: No major deformities noted. No calf tenderness, no cords, calves appear symmetric.   Skin: Warm, Dry, No erythema, No rash.   Neurologic: Alert & oriented x 3, Normal motor function,  No focal deficits noted.   Psychiatric: Affect normal, Judgment normal, Mood normal.     LABS  Results for orders placed or performed during the hospital encounter of 09/17/18   CBC w/ Differential   Result Value Ref Range    WBC 6.7 4.8 - 10.8 K/uL    RBC 4.35 4.20 - 5.40 M/uL    Hemoglobin 13.7 " 12.0 - 16.0 g/dL    Hematocrit 41.3 37.0 - 47.0 %    MCV 94.9 81.4 - 97.8 fL    MCH 31.5 27.0 - 33.0 pg    MCHC 33.2 (L) 33.6 - 35.0 g/dL    RDW 46.2 35.9 - 50.0 fL    Platelet Count 219 164 - 446 K/uL    MPV 11.7 9.0 - 12.9 fL    Neutrophils-Polys 51.90 44.00 - 72.00 %    Lymphocytes 37.90 22.00 - 41.00 %    Monocytes 7.40 0.00 - 13.40 %    Eosinophils 1.90 0.00 - 6.90 %    Basophils 0.60 0.00 - 1.80 %    Immature Granulocytes 0.30 0.00 - 0.90 %    Nucleated RBC 0.00 /100 WBC    Neutrophils (Absolute) 3.49 2.00 - 7.15 K/uL    Lymphs (Absolute) 2.55 1.00 - 4.80 K/uL    Monos (Absolute) 0.50 0.00 - 0.85 K/uL    Eos (Absolute) 0.13 0.00 - 0.51 K/uL    Baso (Absolute) 0.04 0.00 - 0.12 K/uL    Immature Granulocytes (abs) 0.02 0.00 - 0.11 K/uL    NRBC (Absolute) 0.00 K/uL   Basic Metabolic Panel (BMP)   Result Value Ref Range    Sodium 138 135 - 145 mmol/L    Potassium 3.9 3.6 - 5.5 mmol/L    Chloride 104 96 - 112 mmol/L    Co2 26 20 - 33 mmol/L    Glucose 80 65 - 99 mg/dL    Bun 17 8 - 22 mg/dL    Creatinine 0.74 0.50 - 1.40 mg/dL    Calcium 9.7 8.5 - 10.5 mg/dL    Anion Gap 8.0 0.0 - 11.9   ESTIMATED GFR   Result Value Ref Range    GFR If African American >60 >60 mL/min/1.73 m 2    GFR If Non African American >60 >60 mL/min/1.73 m 2     All labs reviewed by me.    RADIOLOGY  DX-CHEST-2 VIEWS   Final Result      No evidence of acute cardiopulmonary disease        The radiologist's interpretation of all radiological studies have been reviewed by me.    COURSE & MEDICAL DECISION MAKING  Pertinent Labs & Imaging studies reviewed. (See chart for details)    8:45 PM - Patient seen and examined at bedside. Patient will be treated with Toradol 30 mg, Proventil 5 mg. Ordered DX chest, CBC, BMP, POC urine pregnancy to evaluate her symptoms. The differential diagnoses include but are not limited to: pneumonia, pleurisy, post surgical pain. The patient does have normal vital signs and does not show signs of PE at this time.      11:11 PM- Reviewed the patient's lab and imaging results. Patient is negative for pneumonia.    11:24 PM- Patient rechecked at bedside. The patient was updated on diagnostic test results as seen above. The patient will be discharged, status improved, with instructions regarding supportive care and medications. She will be discharged home with an albuterol inhaler, Tessalon, and Motrin. Instructions were given for follow-up. Discussed indications for seeking immediate medical attention, including worsening new chest pain, shortness of breath, productive cough, fevers, and hemoptysis. Patient was given the opportunity for questions. The patient understands and agrees.     Medical Decision Making: At this point time I think the patient's chest pain may be secondary to pleurisy versus some post procedural pain from her recent chest tubes.  There is no signs of pneumonia.  Her lungs are otherwise clear.  Do not think she needs to be on any antibiotics.  I will go ahead and treat her pain with ibuprofen.  I will give her an albuterol inhaler and Tessalon Perles to help decrease her coughing.  I think this likely all viral in nature.    The patient will return for new or worsening symptoms and is stable at the time of discharge.    DISPOSITION:  Patient will be discharged home in stable condition.    FOLLOW UP:  Your doctor    Schedule an appointment as soon as possible for a visit in 3 days      OUTPATIENT MEDICATIONS:  Discharge Medication List as of 9/17/2018 11:28 PM      START taking these medications    Details   ibuprofen (MOTRIN) 600 MG Tab Take 1 Tab by mouth every 6 hours as needed., Disp-30 Tab, R-0, Print Rx Paper      albuterol 108 (90 Base) MCG/ACT Aero Soln inhalation aerosol Inhale 2 Puffs by mouth every 6 hours as needed (Cough)., Disp-8.5 g, R-0, Print Rx Paper      benzonatate (TESSALON) 100 MG Cap Take 1 Cap by mouth 3 times a day as needed for Cough., Disp-30 Cap, R-0, Print Rx Paper             FINAL IMPRESSION  1. Pleurisy    2. Cough        Agatha STILES (Scribe), am scribing for, and in the presence of, Marcio Dobbs M.D.    Electronically signed by: Agatha Lilly ([email or web address]), 9/17/2018    Marcio STILES M.D. personally performed the services described in this documentation, as scribed by Agatha Lilly in my presence, and it is both accurate and complete. C.     The note accurately reflects work and decisions made by me.  Marcio Dobbs  9/18/2018  1:36 AM

## 2018-09-18 NOTE — DISCHARGE INSTRUCTIONS
Return if you have new or different chest pain, shortness of breath, productive cough, or fever that will not go down with Tylenol or Ibuprofen.   Pleurisy  Pleurisy is irritation and swelling (inflammation) of the linings of your lungs (pleura). This can cause pain in your chest, back, or shoulder. It can also cause trouble breathing.  Follow these instructions at home:  Medicines  · Take over-the-counter and prescription medicines only as told by your doctor.  · If you were prescribed antibiotic medicine, take it as told by your doctor. Do not stop taking the antibiotic even if you start to feel better.  Activity  · Rest and return to your normal activities as told by your doctor. Ask your doctor what activities are safe for you.  · Do not drive or use heavy machinery while taking prescription pain medicine.  General instructions  · Watch for any changes in your condition.  · Take deep breaths often, even if it is painful. This can help prevent lung problems.  · When lying down, lie on your painful side. This may help you feel less pain.  · Do not smoke. If you need help quitting, ask your doctor.  · Keep all follow-up visits as told by your doctor. This is important.  Contact a doctor if:  · You have pain that:  ¨ Gets worse.  ¨ Does not get better with medicine.  ¨ Lasts for more than 1 week.  · You have a fever or chills.  · You have a cough that does not get better at home.  · You have trouble breathing that does not get better at home.  · You cough up liquid that looks like pus (purulent secretions).  Get help right away if:  · Your lips, fingernails, or toenails turn dark or turn blue.  · You cough up blood.  · You have trouble breathing that gets worse.  · You are making loud noises when you breathe (wheezing) and this gets worse.  · You have pain that spreads to your neck, arms, or jaw.  · You get a rash.  · You throw up (vomit).  · You pass out (faint).  Summary  · Pleurisy is irritation and swelling  (inflammation) of the linings of your lungs (pleura).  · Pleurisy can cause pain and trouble breathing.  · If you have a cough that does not get better at home, contact your doctor.  · Get help right away if you are having trouble breathing and it is getting worse.  This information is not intended to replace advice given to you by your health care provider. Make sure you discuss any questions you have with your health care provider.  Document Released: 11/30/2009 Document Revised: 09/11/2017 Document Reviewed: 09/11/2017  [employer] Interactive Patient Education © 2017 [employer]          Cough, Adult  Introduction  A cough helps to clear your throat and lungs. A cough may last only 2-3 weeks (acute), or it may last longer than 8 weeks (chronic). Many different things can cause a cough. A cough may be a sign of an illness or another medical condition.  Follow these instructions at home:  · Pay attention to any changes in your cough.  · Take medicines only as told by your doctor.  ¨ If you were prescribed an antibiotic medicine, take it as told by your doctor. Do not stop taking it even if you start to feel better.  ¨ Talk with your doctor before you try using a cough medicine.  · Drink enough fluid to keep your pee (urine) clear or pale yellow.  · If the air is dry, use a cold steam vaporizer or humidifier in your home.  · Stay away from things that make you cough at work or at home.  · If your cough is worse at night, try using extra pillows to raise your head up higher while you sleep.  · Do not smoke, and try not to be around smoke. If you need help quitting, ask your doctor.  · Do not have caffeine.  · Do not drink alcohol.  · Rest as needed.  Contact a doctor if:  · You have new problems (symptoms).  · You cough up yellow fluid (pus).  · Your cough does not get better after 2-3 weeks, or your cough gets worse.  · Medicine does not help your cough and you are not sleeping well.  · You have pain that gets worse  or pain that is not helped with medicine.  · You have a fever.  · You are losing weight and you do not know why.  · You have night sweats.  Get help right away if:  · You cough up blood.  · You have trouble breathing.  · Your heartbeat is very fast.  This information is not intended to replace advice given to you by your health care provider. Make sure you discuss any questions you have with your health care provider.  Document Released: 08/30/2012 Document Revised: 05/25/2017 Document Reviewed: 02/24/2016  © 2017 [employer]

## 2019-01-21 ENCOUNTER — APPOINTMENT (OUTPATIENT)
Dept: INTERNAL MEDICINE | Facility: MEDICAL CENTER | Age: 24
End: 2019-01-21
Payer: MEDICAID

## 2022-01-22 ENCOUNTER — APPOINTMENT (OUTPATIENT)
Dept: RADIOLOGY | Facility: MEDICAL CENTER | Age: 27
End: 2022-01-22
Attending: EMERGENCY MEDICINE
Payer: MEDICAID

## 2022-01-22 ENCOUNTER — HOSPITAL ENCOUNTER (EMERGENCY)
Facility: MEDICAL CENTER | Age: 27
End: 2022-01-22
Attending: EMERGENCY MEDICINE
Payer: MEDICAID

## 2022-01-22 VITALS
WEIGHT: 135 LBS | HEART RATE: 52 BPM | OXYGEN SATURATION: 95 % | HEIGHT: 63 IN | RESPIRATION RATE: 18 BRPM | BODY MASS INDEX: 23.92 KG/M2 | TEMPERATURE: 97.6 F | DIASTOLIC BLOOD PRESSURE: 65 MMHG | SYSTOLIC BLOOD PRESSURE: 120 MMHG

## 2022-01-22 DIAGNOSIS — M54.50 LUMBAR BACK PAIN: ICD-10-CM

## 2022-01-22 LAB
ALBUMIN SERPL BCP-MCNC: 3.7 G/DL (ref 3.2–4.9)
ALBUMIN/GLOB SERPL: 1.9 G/DL
ALP SERPL-CCNC: 40 U/L (ref 30–99)
ALT SERPL-CCNC: 10 U/L (ref 2–50)
ANION GAP SERPL CALC-SCNC: 11 MMOL/L (ref 7–16)
APPEARANCE UR: CLEAR
AST SERPL-CCNC: 14 U/L (ref 12–45)
BACTERIA #/AREA URNS HPF: NEGATIVE /HPF
BASOPHILS # BLD AUTO: 0.4 % (ref 0–1.8)
BASOPHILS # BLD: 0.03 K/UL (ref 0–0.12)
BILIRUB SERPL-MCNC: 1.5 MG/DL (ref 0.1–1.5)
BILIRUB UR QL STRIP.AUTO: NEGATIVE
BUN SERPL-MCNC: 12 MG/DL (ref 8–22)
CALCIUM SERPL-MCNC: 8.2 MG/DL (ref 8.5–10.5)
CHLORIDE SERPL-SCNC: 114 MMOL/L (ref 96–112)
CO2 SERPL-SCNC: 17 MMOL/L (ref 20–33)
COLOR UR: YELLOW
CREAT SERPL-MCNC: 0.63 MG/DL (ref 0.5–1.4)
EOSINOPHIL # BLD AUTO: 0.04 K/UL (ref 0–0.51)
EOSINOPHIL NFR BLD: 0.6 % (ref 0–6.9)
EPI CELLS #/AREA URNS HPF: ABNORMAL /HPF
ERYTHROCYTE [DISTWIDTH] IN BLOOD BY AUTOMATED COUNT: 42.7 FL (ref 35.9–50)
FLUAV RNA SPEC QL NAA+PROBE: NEGATIVE
FLUBV RNA SPEC QL NAA+PROBE: NEGATIVE
GLOBULIN SER CALC-MCNC: 1.9 G/DL (ref 1.9–3.5)
GLUCOSE SERPL-MCNC: 76 MG/DL (ref 65–99)
GLUCOSE UR STRIP.AUTO-MCNC: NEGATIVE MG/DL
HCG SERPL QL: NEGATIVE
HCT VFR BLD AUTO: 39.2 % (ref 37–47)
HGB BLD-MCNC: 13.5 G/DL (ref 12–16)
HYALINE CASTS #/AREA URNS LPF: ABNORMAL /LPF
IMM GRANULOCYTES # BLD AUTO: 0.02 K/UL (ref 0–0.11)
IMM GRANULOCYTES NFR BLD AUTO: 0.3 % (ref 0–0.9)
KETONES UR STRIP.AUTO-MCNC: 15 MG/DL
LEUKOCYTE ESTERASE UR QL STRIP.AUTO: ABNORMAL
LYMPHOCYTES # BLD AUTO: 1.28 K/UL (ref 1–4.8)
LYMPHOCYTES NFR BLD: 18.2 % (ref 22–41)
MCH RBC QN AUTO: 33.4 PG (ref 27–33)
MCHC RBC AUTO-ENTMCNC: 34.4 G/DL (ref 33.6–35)
MCV RBC AUTO: 97 FL (ref 81.4–97.8)
MICRO URNS: ABNORMAL
MONOCYTES # BLD AUTO: 0.52 K/UL (ref 0–0.85)
MONOCYTES NFR BLD AUTO: 7.4 % (ref 0–13.4)
NEUTROPHILS # BLD AUTO: 5.15 K/UL (ref 2–7.15)
NEUTROPHILS NFR BLD: 73.1 % (ref 44–72)
NITRITE UR QL STRIP.AUTO: NEGATIVE
NRBC # BLD AUTO: 0 K/UL
NRBC BLD-RTO: 0 /100 WBC
PH UR STRIP.AUTO: 8.5 [PH] (ref 5–8)
PLATELET # BLD AUTO: 174 K/UL (ref 164–446)
PMV BLD AUTO: 12.3 FL (ref 9–12.9)
POTASSIUM SERPL-SCNC: 3.3 MMOL/L (ref 3.6–5.5)
PROT SERPL-MCNC: 5.6 G/DL (ref 6–8.2)
PROT UR QL STRIP: NEGATIVE MG/DL
RBC # BLD AUTO: 4.04 M/UL (ref 4.2–5.4)
RBC # URNS HPF: ABNORMAL /HPF
RBC UR QL AUTO: ABNORMAL
RSV RNA SPEC QL NAA+PROBE: NEGATIVE
SARS-COV-2 RNA RESP QL NAA+PROBE: NOTDETECTED
SODIUM SERPL-SCNC: 142 MMOL/L (ref 135–145)
SP GR UR STRIP.AUTO: 1.02
SPECIMEN SOURCE: NORMAL
UROBILINOGEN UR STRIP.AUTO-MCNC: 0.2 MG/DL
WBC # BLD AUTO: 7 K/UL (ref 4.8–10.8)
WBC #/AREA URNS HPF: ABNORMAL /HPF

## 2022-01-22 PROCEDURE — 700101 HCHG RX REV CODE 250: Performed by: EMERGENCY MEDICINE

## 2022-01-22 PROCEDURE — 700111 HCHG RX REV CODE 636 W/ 250 OVERRIDE (IP): Performed by: EMERGENCY MEDICINE

## 2022-01-22 PROCEDURE — 72148 MRI LUMBAR SPINE W/O DYE: CPT

## 2022-01-22 PROCEDURE — A9270 NON-COVERED ITEM OR SERVICE: HCPCS | Performed by: EMERGENCY MEDICINE

## 2022-01-22 PROCEDURE — 99284 EMERGENCY DEPT VISIT MOD MDM: CPT

## 2022-01-22 PROCEDURE — 700102 HCHG RX REV CODE 250 W/ 637 OVERRIDE(OP): Performed by: EMERGENCY MEDICINE

## 2022-01-22 PROCEDURE — 36415 COLL VENOUS BLD VENIPUNCTURE: CPT

## 2022-01-22 PROCEDURE — 81001 URINALYSIS AUTO W/SCOPE: CPT

## 2022-01-22 PROCEDURE — 96375 TX/PRO/DX INJ NEW DRUG ADDON: CPT

## 2022-01-22 PROCEDURE — 0241U HCHG SARS-COV-2 COVID-19 NFCT DS RESP RNA 4 TRGT MIC: CPT

## 2022-01-22 PROCEDURE — 84703 CHORIONIC GONADOTROPIN ASSAY: CPT

## 2022-01-22 PROCEDURE — 96376 TX/PRO/DX INJ SAME DRUG ADON: CPT

## 2022-01-22 PROCEDURE — C9803 HOPD COVID-19 SPEC COLLECT: HCPCS | Performed by: EMERGENCY MEDICINE

## 2022-01-22 PROCEDURE — 80053 COMPREHEN METABOLIC PANEL: CPT

## 2022-01-22 PROCEDURE — 96374 THER/PROPH/DIAG INJ IV PUSH: CPT

## 2022-01-22 PROCEDURE — 85025 COMPLETE CBC W/AUTO DIFF WBC: CPT

## 2022-01-22 RX ORDER — METHOCARBAMOL 500 MG/1
500 TABLET, FILM COATED ORAL 4 TIMES DAILY
Qty: 120 TABLET | Refills: 0 | Status: SHIPPED | OUTPATIENT
Start: 2022-01-22 | End: 2022-01-23 | Stop reason: SDUPTHER

## 2022-01-22 RX ORDER — KETOROLAC TROMETHAMINE 30 MG/ML
15 INJECTION, SOLUTION INTRAMUSCULAR; INTRAVENOUS ONCE
Status: COMPLETED | OUTPATIENT
Start: 2022-01-22 | End: 2022-01-22

## 2022-01-22 RX ORDER — OXYCODONE HYDROCHLORIDE AND ACETAMINOPHEN 5; 325 MG/1; MG/1
1 TABLET ORAL EVERY 4 HOURS PRN
Qty: 10 TABLET | Refills: 0 | Status: SHIPPED | OUTPATIENT
Start: 2022-01-22 | End: 2022-01-23 | Stop reason: SDUPTHER

## 2022-01-22 RX ORDER — OXYCODONE HYDROCHLORIDE AND ACETAMINOPHEN 5; 325 MG/1; MG/1
1 TABLET ORAL ONCE
Status: COMPLETED | OUTPATIENT
Start: 2022-01-22 | End: 2022-01-22

## 2022-01-22 RX ORDER — METHOCARBAMOL 500 MG/1
500 TABLET, FILM COATED ORAL ONCE
Status: COMPLETED | OUTPATIENT
Start: 2022-01-22 | End: 2022-01-22

## 2022-01-22 RX ORDER — MORPHINE SULFATE 4 MG/ML
4 INJECTION INTRAVENOUS ONCE
Status: COMPLETED | OUTPATIENT
Start: 2022-01-22 | End: 2022-01-22

## 2022-01-22 RX ORDER — DEXAMETHASONE SODIUM PHOSPHATE 4 MG/ML
6 INJECTION, SOLUTION INTRA-ARTICULAR; INTRALESIONAL; INTRAMUSCULAR; INTRAVENOUS; SOFT TISSUE ONCE
Status: COMPLETED | OUTPATIENT
Start: 2022-01-22 | End: 2022-01-22

## 2022-01-22 RX ORDER — LIDOCAINE 50 MG/G
1 PATCH TOPICAL ONCE
Status: DISCONTINUED | OUTPATIENT
Start: 2022-01-22 | End: 2022-01-22 | Stop reason: HOSPADM

## 2022-01-22 RX ORDER — METHYLPREDNISOLONE 4 MG/1
TABLET ORAL
Qty: 1 EACH | Refills: 0 | Status: SHIPPED | OUTPATIENT
Start: 2022-01-22 | End: 2022-01-23 | Stop reason: SDUPTHER

## 2022-01-22 RX ORDER — DIAZEPAM 5 MG/ML
5 INJECTION, SOLUTION INTRAMUSCULAR; INTRAVENOUS ONCE
Status: COMPLETED | OUTPATIENT
Start: 2022-01-22 | End: 2022-01-22

## 2022-01-22 RX ADMIN — LIDOCAINE 1 PATCH: 50 PATCH TOPICAL at 13:38

## 2022-01-22 RX ADMIN — MORPHINE SULFATE 4 MG: 4 INJECTION INTRAVENOUS at 16:43

## 2022-01-22 RX ADMIN — OXYCODONE HYDROCHLORIDE AND ACETAMINOPHEN 1 TABLET: 5; 325 TABLET ORAL at 17:59

## 2022-01-22 RX ADMIN — METHOCARBAMOL 500 MG: 500 TABLET ORAL at 18:02

## 2022-01-22 RX ADMIN — KETOROLAC TROMETHAMINE 15 MG: 30 INJECTION, SOLUTION INTRAMUSCULAR at 13:37

## 2022-01-22 RX ADMIN — DEXAMETHASONE SODIUM PHOSPHATE 6 MG: 4 INJECTION, SOLUTION INTRA-ARTICULAR; INTRALESIONAL; INTRAMUSCULAR; INTRAVENOUS; SOFT TISSUE at 17:59

## 2022-01-22 RX ADMIN — KETOROLAC TROMETHAMINE 15 MG: 30 INJECTION, SOLUTION INTRAMUSCULAR; INTRAVENOUS at 18:02

## 2022-01-22 RX ADMIN — DIAZEPAM 5 MG: 5 INJECTION, SOLUTION INTRAMUSCULAR; INTRAVENOUS at 13:09

## 2022-01-22 NOTE — ED TRIAGE NOTES
Chief Complaint   Patient presents with   • Low Back Pain     Low back pain since yesterday morning. Patient states she saw a chiropractor, which improved pain for roughly one hour. Denies any radiation of pain. Denies numbness and/or tingling in BLE. Denies chronic pain.      Patient given following medication with EMS:  100mcg Fentanyl  2mg Versed  4mg Zofran  250ml NS

## 2022-01-22 NOTE — ED PROVIDER NOTES
ED Provider Note    Scribed for Jessica Martinez M.D. by Josue Cortez-Reyes. 1/22/2022, 12:49 PM.    Primary care provider: Ignacio Resendiz M.D.  Means of arrival: EMS  History obtained from: Patient  History limited by: None    CHIEF COMPLAINT  Chief Complaint   Patient presents with   • Low Back Pain     Low back pain since yesterday morning. Patient states she saw a chiropractor, which improved pain for roughly one hour. Denies any radiation of pain. Denies numbness and/or tingling in BLE. Denies chronic pain.        HPI  Maritza Swann is a 26 y.o. female who presents to the Emergency Department for evaluation of low back pain onset yesterday morning. She reports that her symptoms onset suddenly when she woke up and went to reach for the light. She denies any radiation of her pain to her lower extremities. Her pain is exacerbated when sitting and when walking, and mildly improved when she lays on her side. She states that she saw a chiropractor yesterday, noting that this improved her back pain for about an hour. She denies any additional fevers or chills she denies any anesthesia to her private areas or stool incontinence or lower extremity weakness but endorses urinary retention. She reports a history of ovarian cysts and endometriosis.     REVIEW OF SYSTEMS  Pertinent positives include low back pain and urinary retention. Pertinent negatives include no fevers or chills.  All other systems reviewed and negative.    PAST MEDICAL HISTORY   has a past medical history of Asthma, Depression, Endometriosis (2014), Migraine, Other specified symptom associated with female genital organs, and Ovarian cyst (2014).    SURGICAL HISTORY   has a past surgical history that includes dental extraction(s) (2008); laparoscopy (7/20/2014); mammoplasty augmentation (Bilateral, 5/27/2015); and thoracoscopy (Left, 5/2/2018).    SOCIAL HISTORY  Social History     Tobacco Use   • Smoking status: Never Smoker   • Smokeless  "tobacco: Never Used   Substance Use Topics   • Alcohol use: No   • Drug use: No      Social History     Substance and Sexual Activity   Drug Use No       FAMILY HISTORY  Family History   Problem Relation Age of Onset   • Hypertension Father    • Bipolar disorder Sister    • Other Sister         mental disorder.   • Asthma Brother    • Arthritis Maternal Grandmother    • Diabetes Maternal Grandmother    • Bipolar disorder Maternal Grandfather    • Other Maternal Grandfather         mental disorder.   • Diabetes Unknown    • Hypertension Unknown        CURRENT MEDICATIONS  Home Medications     Reviewed by Sakina Fitzpatrick R.N. (Registered Nurse) on 01/22/22 at 1232  Med List Status: Partial   Medication Last Dose Status   albuterol 108 (90 Base) MCG/ACT Aero Soln inhalation aerosol PRN Active   benzonatate (TESSALON) 100 MG Cap Not taking Active   doxycycline (VIBRAMYCIN) 100 MG Tab Not taking Active   ibuprofen (MOTRIN) 600 MG Tab Not taking Active   norethindrone-ethinyl estradiol (JUNEL FE 1/20) 1-20 MG-MCG per tablet Not taking Active                ALLERGIES  Allergies   Allergen Reactions   • Iron Sucrose Rash and Itching     Face, neck, arm rash with injection       PHYSICAL EXAM  VITAL SIGNS: /55   Pulse (!) 55   Temp 36.4 °C (97.6 °F) (Temporal)   Resp 16   Ht 1.6 m (5' 3\")   Wt 61.2 kg (135 lb)   LMP 01/22/2022   SpO2 97%   Breastfeeding No   BMI 23.91 kg/m²   Constitutional: Alert in no apparent distress.  HENT: No signs of trauma, Bilateral external ears normal, Nose normal.   Eyes: Pupils are equal and reactive, Conjunctiva normal, Non-icteric.   Neck: Normal range of motion, No tenderness, Supple, No stridor.   Cardiovascular: Regular rate and rhythm, no murmurs.   Thorax & Lungs: Normal breath sounds, No respiratory distress, No wheezing, No chest tenderness.   Abdomen: Bowel sounds normal, Soft, No tenderness, No masses, No peritoneal signs.  Skin: Warm, Dry, No erythema, No rash.   Back: " Tenderness in midline lumbar spine, no obvious deformities  Musculoskeletal:  No major deformities noted. Patellar DTR's intact, sensations intact in bilateral lower extremities, unable to access range of motion of lower extremities secondary to pain.    Neurologic: Alert, moving all extremities without difficulty, no focal deficits.        LABS  Labs Reviewed   CBC WITH DIFFERENTIAL - Abnormal; Notable for the following components:       Result Value    RBC 4.04 (*)     MCH 33.4 (*)     Neutrophils-Polys 73.10 (*)     Lymphocytes 18.20 (*)     All other components within normal limits   COMP METABOLIC PANEL - Abnormal; Notable for the following components:    Potassium 3.3 (*)     Chloride 114 (*)     Co2 17 (*)     Calcium 8.2 (*)     Total Protein 5.6 (*)     All other components within normal limits   URINALYSIS - Abnormal; Notable for the following components:    Ph 8.5 (*)     Ketones 15 (*)     Leukocyte Esterase Trace (*)     Occult Blood Large (*)     All other components within normal limits   URINE MICROSCOPIC (W/UA) - Abnormal; Notable for the following components:    WBC 5-10 (*)     RBC  (*)     All other components within normal limits   HCG QUAL SERUM   COV-2, FLU A/B, AND RSV BY PCR    Narrative:     Have you been in close contact with a person who is suspected  or known to be positive for COVID-19 within the last 30 days  (e.g. last seen that person < 30 days ago)->No   ESTIMATED GFR     All labs reviewed by me.    RADIOLOGY  MR-LUMBAR SPINE-W/O   Final Result      1.  Moderate central disc protrusion at the L4-5 level which indents the ventral surface of thecal sac.      2.  Mild to moderate central disc protrusion at the L5-S1 level which indents ventral surface of thecal sac.        The radiologist's interpretation of all radiological studies have been reviewed by me.    COURSE & MEDICAL DECISION MAKING  Pertinent Labs & Imaging studies reviewed. (See chart for details)    Differential  "diagnoses include but are not limited to: cauda equina, degeneracy of musculoskeletal spasms, radiculopathy, and  less likely endometriosis    12:49 PM - Patient seen and examined at bedside. Patient will be treated with lidocaine 5% patch, ketorolac, and diazepam. Ordered MR-Lumbar Spine, CBC with differential, CMP, Estimated GFR, Beta-HCG qualitative serum, COV-2, FLU A/B, and RSV by PCR, and Urinalysis  to evaluate her symptoms. I informed the patient of my plan to treat her with the above medications. I discussed obtaining the above labs and imaging. Patient verbalizes understanding and agreement to this plan of care.     4:29 PM - The patient will be treated with morphine 4mg/ml.    4:34 PM - Paged Spinal surgery.  Patient's postvoid residual is 0.    4:43 PM - I reevaluated the patient at bedside. I updated her on her lab and imaging results noted above. I informed her of my plan to discuss her case with spinal surgery. Patient verbalizes understanding and agreement to this plan of care.     4:59 PM - I discussed the patient's case and the above findings with Dr. Watson (Spinal surgery) who agrees to evaluate the patient.     5:39 PM - I reevaluated the patient at bedside. I informed her of my plan to have her follow up with Dr. Watson. She will be treated with dexamethasone, methocarbamol, oxycodone, and ketorolac for her pain.     7:11PM - I reevaluated the patient at bedside. The patient informs me they feel improved following administration of the medication noted above. I discussed plan for discharge and follow up as outlined below. The patient verbalizes they feel comfortable going home. The patient is stable for discharge at this time and will return for any new or worsening symptoms. Patient verbalizes understanding and support with my plan for discharge. Review of vital signs at this visit show: /65   Pulse (!) 52   Temp 36.4 °C (97.6 °F) (Temporal)   Resp 18   Ht 1.6 m (5' 3\")   Wt 61.2 kg (135 " lb)   LMP 01/22/2022   SpO2 95%   Breastfeeding No   BMI 23.91 kg/m²       Decision Making:  This is a 26 y.o. year old female who presents with low back pain.  This is a new problem for the patient and she does endorse some urinary retention.  She has no lower extremity weakness or incontinence of stool or saddle anesthesia however given the urinary retention I was concerned for possible cauda equina MRI was performed.  There is some moderate central disc protrusion at L4-5 and 5-1.  Her postvoid residual however is 0 and she has no other symptoms.  She was given pain control muscle relaxer and steroids and did have some improvement and was able to stand up at bedside and take a few steps.  This is significantly improved from when she first arrived.  I did speak with Dr. Watson as well who recommended she follow-up with him as an outpatient.  COVID test was checked given her differential which was negative.  Given that she feels she can do her ADLs at home at this time she will be discharged.    The patient will not drink alcohol nor drive with prescribed medications. The patient will return for new or worsening symptoms and is stable at the time of discharge. Patient was given return precautions. Patient and/or family member verbalizes understanding and will comply.    DISPOSITION:  Patient will be discharged home in stable condition.    FOLLOW UP:  Raulito Watson M.D.  [address] [facility] [id number] [phone]    Schedule an appointment as soon as possible for a visit   call for follow up    [facility], Emergency Dept  [address] [facility] [address] [phone]    Return for worsening pain, weakness, fever or other concerns      OUTPATIENT MEDICATIONS:  Discharge Medication List as of 1/22/2022  7:27 PM      START taking these medications    Details   methylPREDNISolone (MEDROL DOSEPAK) 4 MG Tablet Therapy Pack Use as directed, Disp-1 Each, R-0, Normal      methocarbamol  (ROBAXIN) 500 MG Tab Take 1 Tablet by mouth 4 times a day.PRN spasmDisp-120 Tablet, R-0, Normal      oxyCODONE-acetaminophen (PERCOCET) 5-325 MG Tab Take 1 Tablet by mouth every four hours as needed for up to 3 days., Disp-10 Tablet, R-0, Normal               FINAL IMPRESSION  1. Lumbar back pain               This dictation has been created using voice recognition software and/or scribes. The accuracy of the dictation is limited by the abilities of the software and the expertise of the scribes. I expect there may be some errors of grammar and possibly content. I made every attempt to manually correct the errors within my dictation. However, errors related to voice recognition software and/or scribes may still exist and should be interpreted within the appropriate context.     I, Josue Cortez-Reyes (Scribe), am scribing for, and in the presence of, Jessica Martinez M.D..    Electronically signed by: Josue Cortez-Reyes (Kourtneyibkeely), 1/22/2022    IJessica M.D. personally performed the services described in this documentation, as scribed by Josue Cortez-Reyes in my presence, and it is both accurate and complete. C.    The note accurately reflects work and decisions made by me.  Jessica Martinez M.D.  1/22/2022  8:07 PM

## 2022-01-23 RX ORDER — METHOCARBAMOL 500 MG/1
500 TABLET, FILM COATED ORAL 4 TIMES DAILY
Qty: 120 TABLET | Refills: 0 | Status: SHIPPED | OUTPATIENT
Start: 2022-01-23 | End: 2022-02-11

## 2022-01-23 RX ORDER — METHYLPREDNISOLONE 4 MG/1
TABLET ORAL
Qty: 1 EACH | Refills: 0 | Status: SHIPPED | OUTPATIENT
Start: 2022-01-23 | End: 2022-02-11 | Stop reason: SDUPTHER

## 2022-01-23 RX ORDER — OXYCODONE HYDROCHLORIDE AND ACETAMINOPHEN 5; 325 MG/1; MG/1
1 TABLET ORAL EVERY 4 HOURS PRN
Qty: 10 TABLET | Refills: 0 | Status: SHIPPED | OUTPATIENT
Start: 2022-01-23 | End: 2022-01-26

## 2022-02-11 ENCOUNTER — APPOINTMENT (OUTPATIENT)
Dept: RADIOLOGY | Facility: MEDICAL CENTER | Age: 27
End: 2022-02-11
Attending: EMERGENCY MEDICINE
Payer: MEDICAID

## 2022-02-11 ENCOUNTER — HOSPITAL ENCOUNTER (EMERGENCY)
Facility: MEDICAL CENTER | Age: 27
End: 2022-02-11
Attending: EMERGENCY MEDICINE
Payer: MEDICAID

## 2022-02-11 VITALS
SYSTOLIC BLOOD PRESSURE: 132 MMHG | HEART RATE: 97 BPM | RESPIRATION RATE: 18 BRPM | OXYGEN SATURATION: 90 % | DIASTOLIC BLOOD PRESSURE: 61 MMHG | HEIGHT: 63 IN | WEIGHT: 132.5 LBS | TEMPERATURE: 98.2 F | BODY MASS INDEX: 23.48 KG/M2

## 2022-02-11 DIAGNOSIS — M54.50 LUMBAR BACK PAIN: ICD-10-CM

## 2022-02-11 DIAGNOSIS — N39.3 STRESS INCONTINENCE OF URINE: ICD-10-CM

## 2022-02-11 DIAGNOSIS — M54.50 LOW BACK PAIN, UNSPECIFIED BACK PAIN LATERALITY, UNSPECIFIED CHRONICITY, UNSPECIFIED WHETHER SCIATICA PRESENT: ICD-10-CM

## 2022-02-11 LAB
ALBUMIN SERPL BCP-MCNC: 4.8 G/DL (ref 3.2–4.9)
ALBUMIN/GLOB SERPL: 1.8 G/DL
ALP SERPL-CCNC: 49 U/L (ref 30–99)
ALT SERPL-CCNC: 9 U/L (ref 2–50)
ANION GAP SERPL CALC-SCNC: 14 MMOL/L (ref 7–16)
APPEARANCE UR: ABNORMAL
AST SERPL-CCNC: 16 U/L (ref 12–45)
BACTERIA #/AREA URNS HPF: ABNORMAL /HPF
BASOPHILS # BLD AUTO: 0.5 % (ref 0–1.8)
BASOPHILS # BLD: 0.04 K/UL (ref 0–0.12)
BILIRUB SERPL-MCNC: 1.2 MG/DL (ref 0.1–1.5)
BILIRUB UR QL STRIP.AUTO: NEGATIVE
BUN SERPL-MCNC: 16 MG/DL (ref 8–22)
CALCIUM SERPL-MCNC: 9.8 MG/DL (ref 8.5–10.5)
CHLORIDE SERPL-SCNC: 102 MMOL/L (ref 96–112)
CO2 SERPL-SCNC: 20 MMOL/L (ref 20–33)
COLOR UR: YELLOW
CREAT SERPL-MCNC: 0.73 MG/DL (ref 0.5–1.4)
EOSINOPHIL # BLD AUTO: 0.02 K/UL (ref 0–0.51)
EOSINOPHIL NFR BLD: 0.2 % (ref 0–6.9)
EPI CELLS #/AREA URNS HPF: ABNORMAL /HPF
ERYTHROCYTE [DISTWIDTH] IN BLOOD BY AUTOMATED COUNT: 42.5 FL (ref 35.9–50)
FLUAV AG SPEC QL IA: NEGATIVE
FLUBV AG SPEC QL IA: NEGATIVE
GLOBULIN SER CALC-MCNC: 2.6 G/DL (ref 1.9–3.5)
GLUCOSE SERPL-MCNC: 80 MG/DL (ref 65–99)
GLUCOSE UR STRIP.AUTO-MCNC: NEGATIVE MG/DL
HCG SERPL QL: NEGATIVE
HCT VFR BLD AUTO: 42.6 % (ref 37–47)
HGB BLD-MCNC: 14.7 G/DL (ref 12–16)
HYALINE CASTS #/AREA URNS LPF: ABNORMAL /LPF
IMM GRANULOCYTES # BLD AUTO: 0.04 K/UL (ref 0–0.11)
IMM GRANULOCYTES NFR BLD AUTO: 0.5 % (ref 0–0.9)
KETONES UR STRIP.AUTO-MCNC: 15 MG/DL
LEUKOCYTE ESTERASE UR QL STRIP.AUTO: ABNORMAL
LYMPHOCYTES # BLD AUTO: 2.15 K/UL (ref 1–4.8)
LYMPHOCYTES NFR BLD: 26.4 % (ref 22–41)
MCH RBC QN AUTO: 33.4 PG (ref 27–33)
MCHC RBC AUTO-ENTMCNC: 34.5 G/DL (ref 33.6–35)
MCV RBC AUTO: 96.8 FL (ref 81.4–97.8)
MICRO URNS: ABNORMAL
MONOCYTES # BLD AUTO: 0.54 K/UL (ref 0–0.85)
MONOCYTES NFR BLD AUTO: 6.6 % (ref 0–13.4)
NEUTROPHILS # BLD AUTO: 5.36 K/UL (ref 2–7.15)
NEUTROPHILS NFR BLD: 65.8 % (ref 44–72)
NITRITE UR QL STRIP.AUTO: NEGATIVE
NRBC # BLD AUTO: 0 K/UL
NRBC BLD-RTO: 0 /100 WBC
PH UR STRIP.AUTO: 7 [PH] (ref 5–8)
PLATELET # BLD AUTO: 204 K/UL (ref 164–446)
PMV BLD AUTO: 11.8 FL (ref 9–12.9)
POTASSIUM SERPL-SCNC: 3.9 MMOL/L (ref 3.6–5.5)
PROT SERPL-MCNC: 7.4 G/DL (ref 6–8.2)
PROT UR QL STRIP: NEGATIVE MG/DL
RBC # BLD AUTO: 4.4 M/UL (ref 4.2–5.4)
RBC # URNS HPF: ABNORMAL /HPF
RBC UR QL AUTO: NEGATIVE
SARS-COV+SARS-COV-2 AG RESP QL IA.RAPID: NOTDETECTED
SODIUM SERPL-SCNC: 136 MMOL/L (ref 135–145)
SP GR UR STRIP.AUTO: 1.02
SPECIMEN SOURCE: NORMAL
UROBILINOGEN UR STRIP.AUTO-MCNC: 0.2 MG/DL
WBC # BLD AUTO: 8.2 K/UL (ref 4.8–10.8)
WBC #/AREA URNS HPF: ABNORMAL /HPF

## 2022-02-11 PROCEDURE — 99284 EMERGENCY DEPT VISIT MOD MDM: CPT

## 2022-02-11 PROCEDURE — 85025 COMPLETE CBC W/AUTO DIFF WBC: CPT

## 2022-02-11 PROCEDURE — 81001 URINALYSIS AUTO W/SCOPE: CPT

## 2022-02-11 PROCEDURE — 700111 HCHG RX REV CODE 636 W/ 250 OVERRIDE (IP)

## 2022-02-11 PROCEDURE — 96376 TX/PRO/DX INJ SAME DRUG ADON: CPT

## 2022-02-11 PROCEDURE — 700111 HCHG RX REV CODE 636 W/ 250 OVERRIDE (IP): Performed by: EMERGENCY MEDICINE

## 2022-02-11 PROCEDURE — 96374 THER/PROPH/DIAG INJ IV PUSH: CPT

## 2022-02-11 PROCEDURE — 36415 COLL VENOUS BLD VENIPUNCTURE: CPT

## 2022-02-11 PROCEDURE — 87400 INFLUENZA A/B EACH AG IA: CPT

## 2022-02-11 PROCEDURE — 87426 SARSCOV CORONAVIRUS AG IA: CPT

## 2022-02-11 PROCEDURE — 84703 CHORIONIC GONADOTROPIN ASSAY: CPT

## 2022-02-11 PROCEDURE — 72148 MRI LUMBAR SPINE W/O DYE: CPT

## 2022-02-11 PROCEDURE — C9803 HOPD COVID-19 SPEC COLLECT: HCPCS | Performed by: EMERGENCY MEDICINE

## 2022-02-11 PROCEDURE — 80053 COMPREHEN METABOLIC PANEL: CPT

## 2022-02-11 PROCEDURE — 96375 TX/PRO/DX INJ NEW DRUG ADDON: CPT

## 2022-02-11 RX ORDER — LORAZEPAM 2 MG/ML
1 INJECTION INTRAMUSCULAR
Status: COMPLETED | OUTPATIENT
Start: 2022-02-11 | End: 2022-02-11

## 2022-02-11 RX ORDER — METHOCARBAMOL 500 MG/1
500 TABLET, FILM COATED ORAL 4 TIMES DAILY PRN
Status: SHIPPED | COMMUNITY
End: 2022-02-11 | Stop reason: SDUPTHER

## 2022-02-11 RX ORDER — METHYLPREDNISOLONE 4 MG/1
TABLET ORAL
Qty: 1 EACH | Refills: 0 | Status: SHIPPED | OUTPATIENT
Start: 2022-02-11 | End: 2022-05-23

## 2022-02-11 RX ORDER — ACETAMINOPHEN 500 MG
500-1000 TABLET ORAL EVERY 6 HOURS PRN
Status: ON HOLD | COMMUNITY
End: 2023-08-07

## 2022-02-11 RX ORDER — ONDANSETRON 4 MG/1
4 TABLET, ORALLY DISINTEGRATING ORAL ONCE
Status: COMPLETED | OUTPATIENT
Start: 2022-02-11 | End: 2022-02-11

## 2022-02-11 RX ORDER — HYDROMORPHONE HYDROCHLORIDE 1 MG/ML
0.5 INJECTION, SOLUTION INTRAMUSCULAR; INTRAVENOUS; SUBCUTANEOUS ONCE
Status: COMPLETED | OUTPATIENT
Start: 2022-02-11 | End: 2022-02-11

## 2022-02-11 RX ORDER — IBUPROFEN 200 MG
400 TABLET ORAL EVERY 6 HOURS PRN
COMMUNITY

## 2022-02-11 RX ORDER — GABAPENTIN 100 MG/1
100 CAPSULE ORAL 3 TIMES DAILY
Qty: 21 CAPSULE | Refills: 0 | Status: SHIPPED | OUTPATIENT
Start: 2022-02-11 | End: 2022-02-18

## 2022-02-11 RX ORDER — METHOCARBAMOL 500 MG/1
500 TABLET, FILM COATED ORAL 4 TIMES DAILY PRN
Qty: 30 TABLET | Refills: 0 | Status: SHIPPED | OUTPATIENT
Start: 2022-02-11 | End: 2022-02-18

## 2022-02-11 RX ADMIN — HYDROMORPHONE HYDROCHLORIDE 0.5 MG: 1 INJECTION, SOLUTION INTRAMUSCULAR; INTRAVENOUS; SUBCUTANEOUS at 15:05

## 2022-02-11 RX ADMIN — ONDANSETRON 4 MG: 4 TABLET, ORALLY DISINTEGRATING ORAL at 20:00

## 2022-02-11 RX ADMIN — LORAZEPAM 1 MG: 2 INJECTION INTRAMUSCULAR; INTRAVENOUS at 17:38

## 2022-02-11 RX ADMIN — HYDROMORPHONE HYDROCHLORIDE 0.5 MG: 1 INJECTION, SOLUTION INTRAMUSCULAR; INTRAVENOUS; SUBCUTANEOUS at 17:44

## 2022-02-11 ASSESSMENT — FIBROSIS 4 INDEX: FIB4 SCORE: 0.69

## 2022-02-11 NOTE — ED NOTES
Med Rec completed per patient   Allergies reviewed  No ORAL antibiotics in last 30 days    Patient completed a Medrol Dosepak about 2 weeks ago

## 2022-02-11 NOTE — ED PROVIDER NOTES
ED Provider Note    ER PROVIDER NOTE        CHIEF COMPLAINT  Chief Complaint   Patient presents with   • Incontinence     Pt reports being seen in ER on 1/22 with MRI and Lumbar spine disk protrusion, pt f/u with neurosurg with PT ordered to start next week, however this morning pt became incontinent of bladder X2 and is unable to control at this time.         HPI  Maritza Swann is a 26 y.o. female who presents to the emergency department complaining of back pain and urinary incontinence.  Patient reports she began having the back pain on January 21, she does not remember any injury or trauma to the area although she had injured her foot earlier in the year and thinks this may have been a compensation injury.  She was seen in the emergency department on January 22 and had an MRI with a lumbar disc trusion is referred to follow-up with neurosurgery.  She saw Dr. Rolle's office, his nurse practitioner and they were initially planning on starting with physical therapy.  Last night and then again this morning the patient had an episode of urinary incontinence where she felt that she just could not feel anything and then wet herself.  She does report some paresthesias to her mainly left leg although no pronounced weakness.  No other focal weakness or numbness.  She reports no abdominal pain, no fevers or chills.  No recent illness, cough, congestion, shortness of breath or chest pain.     REVIEW OF SYSTEMS  Pertinent positives include back pain, incontinence. Pertinent negatives include no fever. See HPI for details. All other systems reviewed and are negative.    PAST MEDICAL HISTORY   has a past medical history of Asthma, Depression, Endometriosis (2014), Migraine, Other specified symptom associated with female genital organs, and Ovarian cyst (2014).    SURGICAL HISTORY   has a past surgical history that includes dental extraction(s) (2008); laparoscopy (7/20/2014); mammoplasty augmentation (Bilateral,  5/27/2015); and thoracoscopy (Left, 5/2/2018).    FAMILY HISTORY  Family History   Problem Relation Age of Onset   • Hypertension Father    • Bipolar disorder Sister    • Other Sister         mental disorder.   • Asthma Brother    • Arthritis Maternal Grandmother    • Diabetes Maternal Grandmother    • Bipolar disorder Maternal Grandfather    • Other Maternal Grandfather         mental disorder.   • Diabetes Other    • Hypertension Other        SOCIAL HISTORY  Social History     Socioeconomic History   • Marital status: Single     Spouse name: Not on file   • Number of children: Not on file   • Years of education: Not on file   • Highest education level: Not on file   Occupational History   • Not on file   Tobacco Use   • Smoking status: Never Smoker   • Smokeless tobacco: Never Used   Vaping Use   • Vaping Use: Never used   Substance and Sexual Activity   • Alcohol use: Yes     Comment: occ   • Drug use: Yes     Types: Inhaled, Oral     Comment: MJ   • Sexual activity: Yes     Partners: Male     Comment: none   Other Topics Concern   • Not on file   Social History Narrative   • Not on file     Social Determinants of Health     Financial Resource Strain:    • Difficulty of Paying Living Expenses: Not on file   Food Insecurity:    • Worried About Running Out of Food in the Last Year: Not on file   • Ran Out of Food in the Last Year: Not on file   Transportation Needs:    • Lack of Transportation (Medical): Not on file   • Lack of Transportation (Non-Medical): Not on file   Physical Activity:    • Days of Exercise per Week: Not on file   • Minutes of Exercise per Session: Not on file   Stress:    • Feeling of Stress : Not on file   Social Connections:    • Frequency of Communication with Friends and Family: Not on file   • Frequency of Social Gatherings with Friends and Family: Not on file   • Attends Buddhist Services: Not on file   • Active Member of Clubs or Organizations: Not on file   • Attends Club or  "Organization Meetings: Not on file   • Marital Status: Not on file   Intimate Partner Violence:    • Fear of Current or Ex-Partner: Not on file   • Emotionally Abused: Not on file   • Physically Abused: Not on file   • Sexually Abused: Not on file   Housing Stability:    • Unable to Pay for Housing in the Last Year: Not on file   • Number of Places Lived in the Last Year: Not on file   • Unstable Housing in the Last Year: Not on file      Social History     Substance and Sexual Activity   Drug Use Yes   • Types: Inhaled, Oral    Comment: SARMAD       CURRENT MEDICATIONS  Home Medications     Reviewed by Mesha Beltran (Pharmacy Tech) on 02/11/22 at 1405  Med List Status: Complete   Medication Last Dose Status   acetaminophen (TYLENOL) 500 MG Tab 2/10/2022 Active   ibuprofen (MOTRIN) 200 MG Tab 2/10/2022 Active   methocarbamol (ROBAXIN) 500 MG Tab 2/9/2022 Active   methylPREDNISolone (MEDROL DOSEPAK) 4 MG Tablet Therapy Pack COMPLETED Active                ALLERGIES  Allergies   Allergen Reactions   • Iron Sucrose Rash and Itching     Face, neck, arm rash with injection       PHYSICAL EXAM  VITAL SIGNS: /67   Pulse 84   Temp 36.8 °C (98.3 °F) (Temporal)   Resp 18   Ht 1.6 m (5' 3\")   Wt 60.1 kg (132 lb 7.9 oz)   LMP 01/22/2022   SpO2 95%   BMI 23.47 kg/m²   Pulse ox interpretation: I interpret this pulse ox as normal.    Constitutional: Alert in no apparent distress.  HENT: No signs of trauma, Bilateral external ears normal, Nose normal.   Eyes: Pupils are equal and reactive, Conjunctiva normal, Non-icteric.   Neck: Normal range of motion, No tenderness, Supple, No stridor.   Lymphatic: No lymphadenopathy noted.   Cardiovascular: Regular rate and rhythm, no murmurs.   Thorax & Lungs: Normal breath sounds, No respiratory distress, No wheezing, No chest tenderness.   Abdomen: Bowel sounds normal, Soft, No tenderness, No masses, No pulsatile masses. No peritoneal signs.  Skin: Warm, Dry, No erythema, No " rash.   Back: No bony tenderness, No CVA tenderness.   Extremities: Intact distal pulses, No edema, No tenderness, No cyanosis,  Musculoskeletal: Good range of motion in all major joints. No tenderness to palpation or major deformities noted.   Neurologic: Alert , strength is 5 out of 5 with bilateral hip flexion and extension, knee flexion and extension, on the right 5 out of 5 with dorsiflexion plantar flexion of the foot, she has mildly diminished strength with both plantar flexion and dorsiflexion of the left foot, sensation somewhat diminished to light touch to the left lateral thigh, otherwise appears to be intact to light touch throughout reflex intact  Psychiatric: Affect normal, Judgment normal, Mood normal.     DIAGNOSTIC STUDIES / PROCEDURES        LABS  Labs Reviewed   CBC WITH DIFFERENTIAL - Abnormal; Notable for the following components:       Result Value    MCH 33.4 (*)     All other components within normal limits   URINALYSIS - Abnormal; Notable for the following components:    Character Cloudy (*)     Ketones 15 (*)     Leukocyte Esterase Trace (*)     All other components within normal limits   URINE MICROSCOPIC (W/UA) - Abnormal; Notable for the following components:    Bacteria Few (*)     Epithelial Cells Moderate (*)     All other components within normal limits   COMP METABOLIC PANEL   COV, FLU A/B, ANTIGENS, ARMANI   ESTIMATED GFR   HCG QUAL SERUM       All labs reviewed by me.    RADIOLOGY  MR-LUMBAR SPINE-W/O   Final Result      1.  No significant change since MRI 1/22/2022      2.  No significant central spinal canal narrowing      3.  Mild bilateral foraminal stenoses at L4-5      4.  Disc protrusion and facet arthropathy of L4-5 and L5-S1        The radiologist's interpretation of all radiological studies have been reviewed and images independently viewed by me.    COURSE & MEDICAL DECISION MAKING  Nursing notes, VS, PMSFHx reviewed in chart.    1:47 PM Patient seen and examined at  bedside. Patient will be treated with 0.5 mg hydromorphone. Ordered for CT, CMP, MRI, rapid COVID19 to evaluate her symptoms.  Dr. Rolle's office consulted    I reviewed the patient records, on 1/22 she had MRI performed that showed moderate central disc protrusion L4/5 indenting the ventral surface thecal sac as well as mild to moderate protrusion at L4-S1  Patient was referred to spine surgery    2:39 PM  Case is discussed with Dr. Prajapati, he agrees with post void residual, repeat MRI ultimate disposition pending these results      7:00 PM  Patient is reevaluated, she is comfortable, MRI is complete, will plan for discharge      Decision Making:  This is a 26 y.o. female present with back pain as well as some incontinence.  Her postvoid residual was reassuring and this is likely stress incontinence rather than overflow incontinence related to her back pain.  Her MRI shows no changes from prior to suggest acute spinal compression syndrome.  She has been seen by neurosurgery who recommend medical management potential epidural injections through the clinic.  I discussed medication options with her, she feels the Robaxin worked well for her, so we will do this in addition to Medrol Dosepak.  She states that the opioid pain medicines did not provide much relief and would like to try the gabapentin so this is also prescribed     The patient will return for new or worsening symptoms and is stable at the time of discharge.    The patient is referred to a primary physician for blood pressure management, diabetic screening, and for all other preventative health concerns.        DISPOSITION:  Patient will be discharged home in stable condition.    FOLLOW UP:  Andriy Rolle M.D.  [address]  [place] [address]  [phone]    Schedule an appointment as soon as possible for a visit   For epidural injection      OUTPATIENT MEDICATIONS:  New Prescriptions    GABAPENTIN (NEURONTIN) 100 MG CAP    Take 1 Capsule by mouth 3  times a day for 7 days.         FINAL IMPRESSION  1. Low back pain, unspecified back pain laterality, unspecified chronicity, unspecified whether sciatica present    2. Stress incontinence of urine    3. Lumbar back pain          The note accurately reflects work and decisions made by me.  González Tavares M.D.  2/11/2022  7:19 PM

## 2022-02-11 NOTE — ED NOTES
PIV placed, labs drawn, MRI screening completed. Pt instructed to remove piercings and jeans prior to MRI.

## 2022-02-11 NOTE — ED TRIAGE NOTES
"Chief Complaint   Patient presents with   • Incontinence     Pt reports being seen in ER on 1/22 with MRI and Lumbar spine disk protrusion, pt f/u with neurosurg with PT ordered to start next week, however this morning pt became incontinent of bladder X2 and is unable to control at this time.       Pt to triage from lobby with above complaint.  Pt states incontinence began last night.  Pt reports \"pressure in lower abdomen\" but unable to tell when urinating until wet.     /92   Pulse (!) 115   Temp 36.8 °C (98.3 °F) (Temporal)   Resp 19   Ht 1.6 m (5' 3\")   Wt 60.1 kg (132 lb 7.9 oz)   LMP 01/22/2022   SpO2 95%   BMI 23.47 kg/m²     "

## 2022-02-12 NOTE — ED NOTES
"Pt discharged home. IV discontinued and gauze placed, pt in possession of belongings. Pt provided discharge education and information pertaining to medications and follow up appointments. Pt received copy of discharge instructions and verbalized understanding. /61   Pulse 97   Temp 36.8 °C (98.2 °F) (Temporal)   Resp 18   Ht 1.6 m (5' 3\")   Wt 60.1 kg (132 lb 7.9 oz)   LMP 01/22/2022   SpO2 90%   BMI 23.47 kg/m²     "

## 2022-02-12 NOTE — CONSULTS
DATE OF SERVICE:  02/11/2022     NEUROSURGERY CONSULTATION     HISTORY OF PRESENT ILLNESS:  This is a 26-year-old woman who has a history of   a bulged L4-5, L5-S1 disk with radiculopathy.  She was seen by Dr. Rolle in   our practice approximately a week ago and was placed on a Medrol Dosepak as   well as some gabapentin and some muscle relaxants.  She was doing well at home   until the last couple days when she started to have intermittent episodes   where she would urinate on herself. Said this happened twice, once yesterday   she got up, she was in a lot of pain and subsequently her friend pointed out   that it looked like she was urinating on herself.  She looked down and noted   that was the case.  Additionally, this occurred last night when she was in   quite a bit of pain in the bed.  She woke up, was in pain and then had urine   on herself and was concerned about this enough along with the paresthesias   that were going down her leg did seem to be worsening that she presented to   the emergency room. In the emergency room, the patient had a postvoid   residual, which showed no finding of urine in her bladder.  She had 80 at the   beginning of the urinary screen and then had 0 on her bladder afterwards.  She   is not having saddle anesthesia.  She has paresthesias down her leg and she   says that she does feel full at times in her bladder or she has difficulty   initiating urination.  As part of her workup, she is due to have an MRI   without contrast of the lumbar spine, which is pending.  Aside from that, she   has no focal weakness in the legs.  She says it is quite painful and has   giveaway strength, but is good at participating with the exam and does not   have focal weakness with locking her feet in a flexed position or an extended   position.  Given this finding it is likely that the urination that she has had   recently secondary to is stress incontinence from birth trauma and was due to    distracting pain as well as sudden onset of excruciating pain, which she said   was there.  She seems quite stoic and does not seem to be patient that would   be consistent with hysteria or histrionic type behavior.  At this time, we   feel would warrant an MRI to ensure that there is no large disk extrusion that   would be consistent with cauda equina.  However, her postvoid residual as   well as examination would go against the cauda equina type syndrome and it is   more likely she is having stress incontinence with significant exacerbation of   her radiculopathy.     REVIEW OF SYSTEMS:  A 12-point review of systems as per HPI.     PAST MEDICAL HISTORY:  Noncontributory.     PAST SURGICAL HISTORY:  Noncontributory.     MEDICATIONS:  The patient is taking a muscle relaxant, gabapentin and Medrol   Dosepak at home.  She is not on any blood thinners.     PHYSICAL EXAMINATION:  GENERAL:  She is alert.  She is oriented.  She is able to answer questions   appropriately.  No signs of dysarthria or aphasia.  She is not in acute   distress at the time of the exam.  She had received some narcotics to help her   with her pain.  NEUROLOGIC:  Motor examination, she does not have any true weakness.  She does   have giveaway strength weakness when she is strained hard against foot   eversion or flexion; however, this does not appear to be any true weakness.    There is no significant numbness.  She does have paresthesias down both legs   she says and she does not have saddle anesthesia.  Here postvoid residual was   0 on the bladder scan, which likely means it is less than 200 and consistent   with not being urinary retention or neurogenic bladder type situation.     IMAGING:  Pending.     ASSESSMENT AND PLAN:  At this time, due to the patient's finding that the   patient has no sign consistent with neurogenic bladder it is unlikely she is   having a neurogenic bladder or cauda equina type syndrome.  If the MRI   demonstrates a  very large disk extrusion we will give her the option of trying   an epidural injection to see if that gives her relief; however, this would   not be able to be accomplished over the weekend. We will continue with a   Medrol Dosepak, gabapentin, Flexeril, potentially Valium and/or Robaxin or   tizanidine and we would also give her some low dose narcotics to help her with   her pain.  If she does have issues with urinary retention and overflow   incontinence, then she would be consistent more with a neurogenic bladder and   would need to go to the OR urgently within 24-hour period of onset. At this   time, this does not appear to be the picture.  We will wait for the MRI for   final recommendations, but if there is no new disk herniation she should be   able to be discharged home with conservative management.  Followup in clinic   with an epidural injection.     A total of 50 minutes was spent in direct patient care, coordination and   consultation.        ______________________________  MD ANANTH Lezama/LIANA    DD:  02/11/2022 18:03  DT:  02/11/2022 18:36    Job#:  [id number]

## 2022-02-12 NOTE — PROGRESS NOTES
MRI shows large disc bulge at L4/5 and L5/S1 causes compression of the lateral recess but not enough compression of the lumbar cystern for cauda equina. Would continue with non-surgical recs from full dictated consult note and FU with Dr. Rolle in clinic     Medrol pack  Muscle relaxants   Pain med's     Epidural next week

## 2022-02-12 NOTE — ED NOTES
PT became dizzy, nauseous, and pale after discharge instructions given. Pt instructed she is not safe to drive herself home. Pt's boyfriend coming to get her. Pt will be discharged to significant other's care.

## 2022-02-12 NOTE — DISCHARGE INSTRUCTIONS
Your MRI shows no changes, please take the medications as directed and follow-up with neurosurgery as we discussed.

## 2022-05-23 ENCOUNTER — PRE-ADMISSION TESTING (OUTPATIENT)
Dept: ADMISSIONS | Facility: MEDICAL CENTER | Age: 27
End: 2022-05-23
Attending: SPECIALIST
Payer: MEDICAID

## 2022-05-23 RX ORDER — AMITRIPTYLINE HYDROCHLORIDE 10 MG/1
10 TABLET, FILM COATED ORAL
COMMUNITY
Start: 2022-04-12 | End: 2023-04-12

## 2022-05-24 ENCOUNTER — PRE-ADMISSION TESTING (OUTPATIENT)
Dept: ADMISSIONS | Facility: MEDICAL CENTER | Age: 27
End: 2022-05-24
Attending: SPECIALIST
Payer: MEDICAID

## 2022-05-24 DIAGNOSIS — Z01.812 PRE-OPERATIVE LABORATORY EXAMINATION: ICD-10-CM

## 2022-05-24 LAB
ANION GAP SERPL CALC-SCNC: 11 MMOL/L (ref 7–16)
BUN SERPL-MCNC: 9 MG/DL (ref 8–22)
CALCIUM SERPL-MCNC: 9.4 MG/DL (ref 8.5–10.5)
CHLORIDE SERPL-SCNC: 104 MMOL/L (ref 96–112)
CO2 SERPL-SCNC: 26 MMOL/L (ref 20–33)
CREAT SERPL-MCNC: 0.77 MG/DL (ref 0.5–1.4)
ERYTHROCYTE [DISTWIDTH] IN BLOOD BY AUTOMATED COUNT: 45.7 FL (ref 35.9–50)
GFR SERPLBLD CREATININE-BSD FMLA CKD-EPI: 108 ML/MIN/1.73 M 2
GLUCOSE SERPL-MCNC: 81 MG/DL (ref 65–99)
HCT VFR BLD AUTO: 42.3 % (ref 37–47)
HGB BLD-MCNC: 14 G/DL (ref 12–16)
MCH RBC QN AUTO: 33.3 PG (ref 27–33)
MCHC RBC AUTO-ENTMCNC: 33.1 G/DL (ref 33.6–35)
MCV RBC AUTO: 100.7 FL (ref 81.4–97.8)
PLATELET # BLD AUTO: 222 K/UL (ref 164–446)
PMV BLD AUTO: 12.1 FL (ref 9–12.9)
POTASSIUM SERPL-SCNC: 4.2 MMOL/L (ref 3.6–5.5)
RBC # BLD AUTO: 4.2 M/UL (ref 4.2–5.4)
SODIUM SERPL-SCNC: 141 MMOL/L (ref 135–145)
WBC # BLD AUTO: 4.5 K/UL (ref 4.8–10.8)

## 2022-05-24 PROCEDURE — 36415 COLL VENOUS BLD VENIPUNCTURE: CPT

## 2022-05-24 PROCEDURE — 80048 BASIC METABOLIC PNL TOTAL CA: CPT

## 2022-05-24 PROCEDURE — 85027 COMPLETE CBC AUTOMATED: CPT

## 2022-05-26 NOTE — H&P
REASON FOR SURGERY:  Ongoing refractory pelvic pain, incapacitating   dysmenorrhea, dyspareunia with a history of endometriosis related symptoms,   now electing to proceed forward with a diagnostic laparoscopy, possible   operative laparoscopy, possible fulguration of endometriosis, any additional   necessary procedures.     HISTORY OF PRESENT ILLNESS:  This is a 26-year-old  1, para 1, negative   urine pregnancy test, kindly referred from her primary care provider,   empirically treated with endometriosis related management, now with a desire   to proceed forward with a workup given her refractoriness to hormonal therapy   and desired to proceed forward with evaluation for the endometriosis with the   surgery as described above as well as starting empiric Orilissa therapy given   the refractoriness to hormonal regimens in the past.  Risks, benefits and   alternatives of proceeding forward with the surgery were discussed.  She has   asked appropriate questions, signed the appropriate consents and is wishing to   proceed forward with the surgery as planned.     PAST MEDICAL HISTORY:  Migraine headaches, lumbago, asthma, pneumonia.     PAST SURGICAL HISTORY:  Ovarian cystectomy with dilation and curettage in   .     OBSTETRICAL HISTORY:  One previous delivery in 2016, 7 pounds 14 ounce infant.     GYNECOLOGIC HISTORY:  The patient reports completely irregular cycles, which   she describes excessive with passage of large clots, incapacitating   dysmenorrhea, dyspareunia, pelvic pain, recurrent urinary tract infections.    She denies any previous sexually transmitted disease or pelvic infections.     FAMILY HISTORY:  Noncontributory.     REVIEW OF SYSTEMS:  Otherwise, unremarkable.     SOCIAL HISTORY:  She is single.  She works as a .  She reports 3-5   alcoholic drinks per week.  She denies tobacco or other drug use.     MEDICATIONS:  Orilissa 150 mg tablets daily, albuterol inhaler p.r.n.,    gabapentin p.r.n., prednisone on occasion, Robaxin for her muscle spasms in   her back.     ALLERGIES:  IRON TABLETS.     PHYSICAL EXAMINATION:  VITAL SIGNS:  She is afebrile, hemodynamically stable.  Current vital signs   can be seen in electronic medical record.  HEART:  Regular rate and rhythm.  CHEST:  Clear to auscultation bilaterally.  ABDOMEN:  Soft, nondistended, nontender.  Bowel sounds are present.  PELVIC:  Shows normal external female genitalia.  Small uterus with tenderness   to palpation located at the uterine fundus, bilateral adnexal tenderness to   palpation, left greater than right, 1/4 tenderness to palpation appreciated.  EXTREMITIES:  Nontender.     DIAGNOSTIC DATA:  Transvaginal pelvic ultrasound done in February showed a   superior fibroid at 1.2 cm, simple cyst on the right ovary at 1.5 cm.     LABORATORY DATA:  Urine pregnancy test was negative.     ASSESSMENT AND PLAN:  A 26-year-old  1, para 1 who desire to proceed   forward with an evaluation and workup of suspected longstanding history of   endometriosis, now deferring any further hormonal therapy in the form of   contraceptive management, but is interested in proceeding forward with   Orilissa therapy as well as the surgery as described above.  Risks, benefits   and alternatives have been addressed.  She has asked appropriate questions and   wished to proceed forward with admission as planned.        ______________________________  MD JOHNIE Banks/MELISSA    DD:  2022 17:55  DT:  2022 18:27    Job#:  [id number]

## 2022-06-02 ENCOUNTER — HOSPITAL ENCOUNTER (OUTPATIENT)
Facility: MEDICAL CENTER | Age: 27
End: 2022-06-02
Attending: SPECIALIST | Admitting: SPECIALIST
Payer: MEDICAID

## 2022-06-02 ENCOUNTER — ANESTHESIA EVENT (OUTPATIENT)
Dept: SURGERY | Facility: MEDICAL CENTER | Age: 27
End: 2022-06-02
Payer: MEDICAID

## 2022-06-02 ENCOUNTER — ANESTHESIA (OUTPATIENT)
Dept: SURGERY | Facility: MEDICAL CENTER | Age: 27
End: 2022-06-02
Payer: MEDICAID

## 2022-06-02 VITALS
RESPIRATION RATE: 18 BRPM | DIASTOLIC BLOOD PRESSURE: 80 MMHG | HEART RATE: 73 BPM | TEMPERATURE: 97.1 F | OXYGEN SATURATION: 97 % | SYSTOLIC BLOOD PRESSURE: 144 MMHG

## 2022-06-02 LAB — HCG UR QL: NEGATIVE

## 2022-06-02 PROCEDURE — 160035 HCHG PACU - 1ST 60 MINS PHASE I: Performed by: SPECIALIST

## 2022-06-02 PROCEDURE — 700111 HCHG RX REV CODE 636 W/ 250 OVERRIDE (IP): Performed by: ANESTHESIOLOGY

## 2022-06-02 PROCEDURE — 81025 URINE PREGNANCY TEST: CPT

## 2022-06-02 PROCEDURE — 700102 HCHG RX REV CODE 250 W/ 637 OVERRIDE(OP): Performed by: ANESTHESIOLOGY

## 2022-06-02 PROCEDURE — 160028 HCHG SURGERY MINUTES - 1ST 30 MINS LEVEL 3: Performed by: SPECIALIST

## 2022-06-02 PROCEDURE — A9270 NON-COVERED ITEM OR SERVICE: HCPCS | Performed by: ANESTHESIOLOGY

## 2022-06-02 PROCEDURE — 160046 HCHG PACU - 1ST 60 MINS PHASE II: Performed by: SPECIALIST

## 2022-06-02 PROCEDURE — 160048 HCHG OR STATISTICAL LEVEL 1-5: Performed by: SPECIALIST

## 2022-06-02 PROCEDURE — 700105 HCHG RX REV CODE 258: Performed by: SPECIALIST

## 2022-06-02 PROCEDURE — 700101 HCHG RX REV CODE 250: Performed by: SPECIALIST

## 2022-06-02 PROCEDURE — 160009 HCHG ANES TIME/MIN: Performed by: SPECIALIST

## 2022-06-02 PROCEDURE — 160025 RECOVERY II MINUTES (STATS): Performed by: SPECIALIST

## 2022-06-02 PROCEDURE — 160039 HCHG SURGERY MINUTES - EA ADDL 1 MIN LEVEL 3: Performed by: SPECIALIST

## 2022-06-02 PROCEDURE — 00840 ANES IPER PX LOWER ABD NOS: CPT | Performed by: ANESTHESIOLOGY

## 2022-06-02 PROCEDURE — 700101 HCHG RX REV CODE 250: Performed by: ANESTHESIOLOGY

## 2022-06-02 PROCEDURE — 160002 HCHG RECOVERY MINUTES (STAT): Performed by: SPECIALIST

## 2022-06-02 RX ORDER — MEPERIDINE HYDROCHLORIDE 25 MG/ML
6.25 INJECTION INTRAMUSCULAR; INTRAVENOUS; SUBCUTANEOUS
Status: DISCONTINUED | OUTPATIENT
Start: 2022-06-02 | End: 2022-06-02 | Stop reason: HOSPADM

## 2022-06-02 RX ORDER — ACETAMINOPHEN 500 MG
1000 TABLET ORAL ONCE
Status: COMPLETED | OUTPATIENT
Start: 2022-06-02 | End: 2022-06-02

## 2022-06-02 RX ORDER — SODIUM CHLORIDE, SODIUM LACTATE, POTASSIUM CHLORIDE, CALCIUM CHLORIDE 600; 310; 30; 20 MG/100ML; MG/100ML; MG/100ML; MG/100ML
INJECTION, SOLUTION INTRAVENOUS CONTINUOUS
Status: DISCONTINUED | OUTPATIENT
Start: 2022-06-02 | End: 2022-06-02 | Stop reason: HOSPADM

## 2022-06-02 RX ORDER — OXYCODONE HCL 5 MG/5 ML
5 SOLUTION, ORAL ORAL
Status: COMPLETED | OUTPATIENT
Start: 2022-06-02 | End: 2022-06-02

## 2022-06-02 RX ORDER — CELECOXIB 200 MG/1
400 CAPSULE ORAL ONCE
Status: COMPLETED | OUTPATIENT
Start: 2022-06-02 | End: 2022-06-02

## 2022-06-02 RX ORDER — HYDROMORPHONE HYDROCHLORIDE 1 MG/ML
0.2 INJECTION, SOLUTION INTRAMUSCULAR; INTRAVENOUS; SUBCUTANEOUS
Status: DISCONTINUED | OUTPATIENT
Start: 2022-06-02 | End: 2022-06-02 | Stop reason: HOSPADM

## 2022-06-02 RX ORDER — ROCURONIUM BROMIDE 10 MG/ML
INJECTION, SOLUTION INTRAVENOUS PRN
Status: DISCONTINUED | OUTPATIENT
Start: 2022-06-02 | End: 2022-06-02 | Stop reason: SURG

## 2022-06-02 RX ORDER — SODIUM CHLORIDE, SODIUM LACTATE, POTASSIUM CHLORIDE, CALCIUM CHLORIDE 600; 310; 30; 20 MG/100ML; MG/100ML; MG/100ML; MG/100ML
INJECTION, SOLUTION INTRAVENOUS CONTINUOUS
Status: ACTIVE | OUTPATIENT
Start: 2022-06-02 | End: 2022-06-02

## 2022-06-02 RX ORDER — DIPHENHYDRAMINE HYDROCHLORIDE 50 MG/ML
12.5 INJECTION INTRAMUSCULAR; INTRAVENOUS
Status: DISCONTINUED | OUTPATIENT
Start: 2022-06-02 | End: 2022-06-02 | Stop reason: HOSPADM

## 2022-06-02 RX ORDER — HALOPERIDOL 5 MG/ML
1 INJECTION INTRAMUSCULAR
Status: DISCONTINUED | OUTPATIENT
Start: 2022-06-02 | End: 2022-06-02 | Stop reason: HOSPADM

## 2022-06-02 RX ORDER — OXYCODONE HCL 5 MG/5 ML
10 SOLUTION, ORAL ORAL
Status: COMPLETED | OUTPATIENT
Start: 2022-06-02 | End: 2022-06-02

## 2022-06-02 RX ORDER — HYDROMORPHONE HYDROCHLORIDE 1 MG/ML
0.4 INJECTION, SOLUTION INTRAMUSCULAR; INTRAVENOUS; SUBCUTANEOUS
Status: DISCONTINUED | OUTPATIENT
Start: 2022-06-02 | End: 2022-06-02 | Stop reason: HOSPADM

## 2022-06-02 RX ORDER — CEFAZOLIN SODIUM 1 G/3ML
INJECTION, POWDER, FOR SOLUTION INTRAMUSCULAR; INTRAVENOUS PRN
Status: DISCONTINUED | OUTPATIENT
Start: 2022-06-02 | End: 2022-06-02 | Stop reason: SURG

## 2022-06-02 RX ORDER — LIDOCAINE HYDROCHLORIDE 40 MG/ML
SOLUTION TOPICAL PRN
Status: DISCONTINUED | OUTPATIENT
Start: 2022-06-02 | End: 2022-06-02 | Stop reason: SURG

## 2022-06-02 RX ORDER — ALBUTEROL SULFATE 2.5 MG/3ML
2.5 SOLUTION RESPIRATORY (INHALATION)
Status: DISCONTINUED | OUTPATIENT
Start: 2022-06-02 | End: 2022-06-02 | Stop reason: HOSPADM

## 2022-06-02 RX ORDER — HYDROMORPHONE HYDROCHLORIDE 1 MG/ML
0.1 INJECTION, SOLUTION INTRAMUSCULAR; INTRAVENOUS; SUBCUTANEOUS
Status: DISCONTINUED | OUTPATIENT
Start: 2022-06-02 | End: 2022-06-02 | Stop reason: HOSPADM

## 2022-06-02 RX ORDER — ONDANSETRON 2 MG/ML
4 INJECTION INTRAMUSCULAR; INTRAVENOUS
Status: COMPLETED | OUTPATIENT
Start: 2022-06-02 | End: 2022-06-02

## 2022-06-02 RX ORDER — BUPIVACAINE HYDROCHLORIDE AND EPINEPHRINE 5; 5 MG/ML; UG/ML
INJECTION, SOLUTION EPIDURAL; INTRACAUDAL; PERINEURAL
Status: DISCONTINUED | OUTPATIENT
Start: 2022-06-02 | End: 2022-06-02 | Stop reason: HOSPADM

## 2022-06-02 RX ORDER — BUPIVACAINE HYDROCHLORIDE 5 MG/ML
INJECTION, SOLUTION EPIDURAL; INTRACAUDAL
Status: DISCONTINUED
Start: 2022-06-02 | End: 2022-06-02 | Stop reason: HOSPADM

## 2022-06-02 RX ADMIN — FENTANYL CITRATE 25 MCG: 50 INJECTION, SOLUTION INTRAMUSCULAR; INTRAVENOUS at 17:55

## 2022-06-02 RX ADMIN — LIDOCAINE HYDROCHLORIDE 4 ML: 40 SOLUTION TOPICAL at 17:07

## 2022-06-02 RX ADMIN — ACETAMINOPHEN 1000 MG: 500 TABLET ORAL at 16:07

## 2022-06-02 RX ADMIN — FENTANYL CITRATE 50 MCG: 50 INJECTION, SOLUTION INTRAMUSCULAR; INTRAVENOUS at 18:05

## 2022-06-02 RX ADMIN — FENTANYL CITRATE 100 MCG: 50 INJECTION, SOLUTION INTRAMUSCULAR; INTRAVENOUS at 17:22

## 2022-06-02 RX ADMIN — ONDANSETRON 4 MG: 2 INJECTION INTRAMUSCULAR; INTRAVENOUS at 18:32

## 2022-06-02 RX ADMIN — OXYCODONE HYDROCHLORIDE 5 MG: 5 SOLUTION ORAL at 18:05

## 2022-06-02 RX ADMIN — CEFAZOLIN 2 G: 330 INJECTION, POWDER, FOR SOLUTION INTRAMUSCULAR; INTRAVENOUS at 17:09

## 2022-06-02 RX ADMIN — MEPERIDINE HYDROCHLORIDE 6.25 MG: 25 INJECTION INTRAMUSCULAR; INTRAVENOUS; SUBCUTANEOUS at 17:44

## 2022-06-02 RX ADMIN — PROPOFOL 200 MG: 10 INJECTION, EMULSION INTRAVENOUS at 17:06

## 2022-06-02 RX ADMIN — SODIUM CHLORIDE, POTASSIUM CHLORIDE, SODIUM LACTATE AND CALCIUM CHLORIDE: 600; 310; 30; 20 INJECTION, SOLUTION INTRAVENOUS at 17:02

## 2022-06-02 RX ADMIN — CELECOXIB 400 MG: 200 CAPSULE ORAL at 16:07

## 2022-06-02 RX ADMIN — MEPERIDINE HYDROCHLORIDE 6.25 MG: 25 INJECTION INTRAMUSCULAR; INTRAVENOUS; SUBCUTANEOUS at 17:52

## 2022-06-02 RX ADMIN — FENTANYL CITRATE 100 MCG: 50 INJECTION, SOLUTION INTRAMUSCULAR; INTRAVENOUS at 17:06

## 2022-06-02 RX ADMIN — SUGAMMADEX 200 MG: 100 INJECTION, SOLUTION INTRAVENOUS at 17:31

## 2022-06-02 RX ADMIN — ROCURONIUM BROMIDE 30 MG: 10 INJECTION, SOLUTION INTRAVENOUS at 17:06

## 2022-06-02 RX ADMIN — OXYCODONE HYDROCHLORIDE 5 MG: 5 SOLUTION ORAL at 17:55

## 2022-06-02 ASSESSMENT — PAIN DESCRIPTION - PAIN TYPE
TYPE: SURGICAL PAIN

## 2022-06-02 NOTE — ANESTHESIA PREPROCEDURE EVALUATION
Case: [id number] Date/Time: 06/02/22 1345    Procedures:       LAPAROSCOPY - DIAGNOSTIC, POSSIBLE OPERATIVE (Abdomen)      EXCISION OR FULGURATION, ENDOMETRIOSIS, LAPAROSCOPIC - POSSIBLE, ANY ADDITIONAL NECESSARY PROCEDURES (Abdomen)    Anesthesia type: General    Pre-op diagnosis: PELVIC PAIN, DYSPAREUNIA, DYSMENORRHEA    Location: [facility] ROOM 25 / SURGERY SAME DAY [facility]    Surgeons: Balwinder Webster M.D.          Relevant Problems   PULMONARY   (positive) Mild intermittent asthma without complication      NEURO   (positive) Migraines      CARDIAC   (positive) Migraines       Physical Exam    Airway   Mallampati: II  TM distance: >3 FB  Neck ROM: full       Cardiovascular - normal exam  Rhythm: regular  Rate: normal  (-) murmur     Dental - normal exam           Pulmonary - normal exam  Breath sounds clear to auscultation     Abdominal    Neurological - normal exam                 Anesthesia Plan    ASA 2       Plan - general       Airway plan will be ETT          Induction: intravenous    Postoperative Plan: Postoperative administration of opioids is intended.    Pertinent diagnostic labs and testing reviewed    Informed Consent:    Anesthetic plan and risks discussed with patient.    Use of blood products discussed with: patient whom consented to blood products.

## 2022-06-03 NOTE — ANESTHESIA PROCEDURE NOTES
Airway    Date/Time: 6/2/2022 5:07 PM  Performed by: Gregory Mix M.D.  Authorized by: Gregory Mix M.D.     Location:  OR  Urgency:  Elective  Difficult Airway: No    Indications for Airway Management:  Anesthesia      Spontaneous Ventilation: absent    Sedation Level:  Deep  Preoxygenated: Yes    Patient Position:  Sniffing  Mask Difficulty Assessment:  1 - vent by mask  Final Airway Type:  Endotracheal airway  Final Endotracheal Airway:  ETT  Cuffed: Yes    Technique Used for Successful ETT Placement:  Direct laryngoscopy    Insertion Site:  Oral  Blade Type:  Janice  Laryngoscope Blade/Videolaryngoscope Blade Size:  3  ETT Size (mm):  7.0  Measured from:  Teeth  ETT to Teeth (cm):  21  Placement Verified by: auscultation and capnometry    Cormack-Lehane Classification:  Grade I - full view of glottis  Number of Attempts at Approach:  1

## 2022-06-03 NOTE — ANESTHESIA POSTPROCEDURE EVALUATION
Patient: Maritza Swann    Procedure Summary     Date: 06/02/22 Room / Location: [facility] ROOM 25 / SURGERY SAME DAY [facility]    Anesthesia Start: 1702 Anesthesia Stop: 1738    Procedures:       LAPAROSCOPY - DIAGNOSTIC, POSSIBLE OPERATIVE (Abdomen)      EXCISION OR FULGURATION, ENDOMETRIOSIS, LAPAROSCOPIC - POSSIBLE, ANY ADDITIONAL NECESSARY PROCEDURES (Abdomen) Diagnosis: (PELVIC PAIN, DYSPAREUNIA, DYSMENORRHEA)    Surgeons: Balwinder Webster M.D. Responsible Provider: Gregory Mix M.D.    Anesthesia Type: general ASA Status: 2          Final Anesthesia Type: general  Last vitals  BP        Temp        Pulse       Resp        SpO2          Anesthesia Post Evaluation    Patient location during evaluation: PACU  Patient participation: complete - patient participated  Level of consciousness: awake and alert    Airway patency: patent  Anesthetic complications: no  Cardiovascular status: hemodynamically stable  Respiratory status: acceptable  Hydration status: euvolemic    PONV: none          No complications documented.

## 2022-06-03 NOTE — DISCHARGE INSTRUCTIONS
ACTIVITY: Rest and take it easy for the first 24 hours.  A responsible adult is recommended to remain with you during that time.  It is normal to feel sleepy.  We encourage you to not do anything that requires balance, judgment or coordination.    MILD FLU-LIKE SYMPTOMS ARE NORMAL. YOU MAY EXPERIENCE GENERALIZED MUSCLE ACHES, THROAT IRRITATION, HEADACHE AND/OR SOME NAUSEA.    FOR 24 HOURS DO NOT:  Drive, operate machinery or run household appliances.  Drink beer or alcoholic beverages.   Make important decisions or sign legal documents.    SPECIAL INSTRUCTIONS: See handout    DIET: To avoid nausea, slowly advance diet as tolerated, avoiding spicy or greasy foods for the first day.  Add more substantial food to your diet according to your physician's instructions.  Babies can be fed formula or breast milk as soon as they are hungry.  INCREASE FLUIDS AND FIBER TO AVOID CONSTIPATION.    SURGICAL DRESSING/BATHING: Leave dressing in place for 3 days than remove. Ok to shower tomorrow, no baths, hot tubs or swimming until cleared by doctor.     FOLLOW-UP APPOINTMENT:  A follow-up appointment should be arranged with your doctor; call to schedule.    You should CALL YOUR PHYSICIAN if you develop:  Fever greater than 101 degrees F.  Pain not relieved by medication, or persistent nausea or vomiting.  Excessive bleeding (blood soaking through dressing) or unexpected drainage from the wound.  Extreme redness or swelling around the incision site, drainage of pus or foul smelling drainage.  Inability to urinate or empty your bladder within 8 hours.  Problems with breathing or chest pain.    You should call 911 if you develop problems with breathing or chest pain.  If you are unable to contact your doctor or surgical center, you should go to the nearest emergency room or urgent care center.  Physician's telephone #: [phone]    If any questions arise, call your doctor.  If your doctor is not available, please feel free to  call the Surgical Center at [phone].     A registered nurse may call you a few days after your surgery to see how you are doing after your procedure.    MEDICATIONS: Resume taking daily medication.  Take prescribed pain medication with food.  If no medication is prescribed, you may take non-aspirin pain medication if needed.  PAIN MEDICATION CAN BE VERY CONSTIPATING.  Take a stool softener or laxative such as senokot, pericolace, or milk of magnesia if needed.    Prescription for pain at home. Last pain medication given Tylenol at 4:07pm and Oxycodone at 6:05pm.    If your physician has prescribed pain medication that includes Acetaminophen (Tylenol), do not take additional Acetaminophen (Tylenol) while taking the prescribed medication.    Depression / Suicide Risk    As you are discharged from this [facility] facility, it is important to learn how to keep safe from harming yourself.    Recognize the warning signs:  Abrupt changes in personality, positive or negative- including increase in energy   Giving away possessions  Change in eating patterns- significant weight changes-  positive or negative  Change in sleeping patterns- unable to sleep or sleeping all the time   Unwillingness or inability to communicate  Depression  Unusual sadness, discouragement and loneliness  Talk of wanting to die  Neglect of personal appearance   Rebelliousness- reckless behavior  Withdrawal from people/activities they love  Confusion- inability to concentrate     If you or a loved one observes any of these behaviors or has concerns about self-harm, here's what you can do:  Talk about it- your feelings and reasons for harming yourself  Remove any means that you might use to hurt yourself (examples: pills, rope, extension cords, firearm)  Get professional help from the community (Mental Health, Substance Abuse, psychological counseling)  Do not be alone:Call your Safe Contact- someone whom you trust who will be there for  you.  Call your local CRISIS HOTLINE 187-7386 or 930-194-7080  Call your local Children's Mobile Crisis Response Team [place] (612) 496-3620 or www.[employer]alo  Call the toll free National Suicide Prevention Hotlines   National Suicide Prevention Lifeline 992-796-CUVH (2807)  [place] [employer] Line Network 800-SUICIDE (904-8876)

## 2022-06-03 NOTE — ANESTHESIA TIME REPORT
Anesthesia Start and Stop Event Times     Date Time Event    6/2/2022 1648 Ready for Procedure     1702 Anesthesia Start     1738 Anesthesia Stop        Responsible Staff  06/02/22    Name Role Begin End    Gregory Mix M.D. Anesth 1703 1734        Overtime Reason:  no overtime (within assigned shift)    Comments:

## 2022-06-03 NOTE — OP REPORT
DATE OF SERVICE:  06/02/2022     PREOPERATIVE DIAGNOSES:  Pelvic pain, dysmenorrhea, dyspareunia.     POSTOPERATIVE DIAGNOSES:  Pelvic pain, dysmenorrhea, dyspareunia and pelvic   endometriosis.     PROCEDURES:  Diagnostic laparoscopy, laparoscopic fulguration of endometriosis   lesions.     SURGEON:  Balwinder Webster MD     ANESTHESIA:  General.     ANESTHESIOLOGIST:  Gregory Mix MD     ESTIMATED BLOOD LOSS FOR THE PROCEDURE:  Less than 5 mL     FINDINGS:  Gino-Masters defect located in the posterior cul-de-sac with   endometriosis implant located at the inferior aspect of the defect with 1   small endometriosis implant located on the left pelvic sidewall just lateral   to the mid portion of the uterosacral ligament, which were fulgurated under   direct laparoscopic visualization with an otherwise normal normal-appearing   abdomen and pelvis to laparoscopic visualization.     DESCRIPTION OF PROCEDURE:  The patient was taken to the operating room where   general anesthesia was performed without difficulty.  The patient was then   prepped and draped in the usual sterile fashion.  Lower extremities placed in   Gino stirrups.  Attention was first turned to the perineum.  Weighted   speculum was placed and used a Alejandra retractor.  Single tooth tenaculum was   placed on the anterior lip of the cervix.  [employer] uterine manipulator was then   placed.  Single tooth tenaculum and retractors then removed.  Attention was   then turned to the umbilicus where a 5 mm incision was made.  A Veress needle   was placed, 3.5 L of carboperitoneum then obtained.  A separate 5 mm port was   placed approximately through the way from the anterior superior iliac spine   lateral to the rectus muscle under direct laparoscopic visualization on the   left with a 5 mm port placed.  The Gino-Masters defect was documented with   the endometriosis implant located at the inferior aspect of the defect.  The   implant was grasped, tented away from  the underlying structures and   fulgurated.  The same was then performed of the left pelvic sidewall lesion   just lateral to the mid portion of the uterosacral ligament, again tented away   from any underlying structures and the peritoneal implant was fulgurated.   Remaining portion of the abdomen and pelvis was inspected and found to be free   of any other obvious pathology.  Both adnexa, anterior and posterior   cul-de-sac, pericolic gutters were inspected. Once the inspection was   complete, the 3.5 L of carboperitoneum were removed followed by removal of   other ports under direct laparoscopic visualization.  The incisions were then   reapproximated with single interrupted sutures using 4-0 Vicryl, injected with   30 mL of 0.5% Marcaine with epinephrine.  The patient tolerated the procedure   well and was awoken from general anesthesia and was taken to recovery in   stable condition.        ______________________________  MD JOHNIE Banks/RAMON    DD:  06/02/2022 17:42  DT:  06/02/2022 19:18    Job#:  [id number]

## 2022-06-03 NOTE — OR SURGEON
Immediate Post OP Note    PreOp Diagnosis: Pelvic pain, dysmenorrhea and dyspareunia      PostOp Diagnosis: Same; pelvic endometriosis      Procedure(s):  LAPAROSCOPY - DIAGNOSTIC, POSSIBLE OPERATIVE - Wound Class: Clean  EXCISION OR FULGURATION, ENDOMETRIOSIS, LAPAROSCOPIC     Surgeon(s):  Balwinder Webster M.D.    Anesthesiologist/Type of Anesthesia:  Anesthesiologist: Gregory Mix M.D./General    Surgical Staff:  Circulator: Brandy Boykin R.N.  Scrub Person: Agustín Valladares [place]: Jolene Perla R.N.    Specimens removed if any:  None    Estimated Blood Loss: Less than 5 ccs    Findings: Gino Masters defect in the posterior cul de sac with an endometriosis implant located at the inferior aspect of the defect with one small defect located on the left pelvic sidewall just lateral to the mid portion of the uterosacral ligament which were fulgurated under direct laparoscopic visualization with an otherwise normal appearing abdomen and pelvis to laparoscopic visualization.    Complications: None        6/2/2022 5:36 PM Balwinder Webster M.D.

## 2023-07-06 NOTE — OR NURSING
1737 received patient from OR. Report from Anesthesiologist and OR RN. Patient on 6L of O2 via oxymask. VSS. Monitor connected. Oral airway in place and DC'ed. S/P laparascopy, incision 2 sites assessed and peripad placed.   1755 Pain medication given.   1800 Tolerating room air.   1805 Subsequent dose of pain medication given.   1835 Patient medicated for nausea, tolerating PO liquids.  1853 Patient escorted out to responsible adult via wheelchair by RN. Belongings with patient, ambulated with steady gait. Discharge paperwork and instructions reviewed, signed, and sent with patient.    [facility] Employee Diabetes Program    Tomeka Choudhary is a 38 y.o. female enrolled in the [facility] with Diabetes Program. The goal of this voluntary program is to help employees and covered dependents reach their health maintenance goals in regards to their diabetes diagnosis. According to our records, patient is missing the following requirement(s) that must be completed by July 1, 2023 to avoid discharge from the program:    First A1c result in 2023     Plan:  Attempt made to reach patient by telephone to review above. Left voice message for patient to return clinician's phone call to [phone], option 3. Mychart read 6/12/23.        Sandrita Thorne, [address]   Direct: [phone]  Phone: toll free [phone]       For Pharmacy Admin Tracking Only    Program: Sean in place:  No  Gap Closed?: No   Time Spent (min): 10 Pt called about concerning her A1C requirement letter that she recently received. Pt states that she plans on getting her A1C drawn sometime at the end of this week or next week. She stated that she has been having car problems and her dad is ill, but she will make sure to get it done.      Shannan Bearden, [address]   [phone] Face Mask

## 2023-08-03 ENCOUNTER — APPOINTMENT (OUTPATIENT)
Dept: ADMISSIONS | Facility: MEDICAL CENTER | Age: 28
End: 2023-08-03
Attending: ORTHOPAEDIC SURGERY
Payer: COMMERCIAL

## 2023-08-03 PROBLEM — S82.841D ANKLE FRACTURE, BIMALLEOLAR, CLOSED, RIGHT, WITH ROUTINE HEALING, SUBSEQUENT ENCOUNTER: Status: ACTIVE | Noted: 2023-08-03

## 2023-08-03 NOTE — H&P (VIEW-ONLY)
Subjective   Patient ID:  Maritza Swann is a 27 y.o. female.    Chief Complaint:    Chief Complaint   Patient presents with    Right Ankle - Fracture, Pain    Fracture and Pain of the Right Ankle    Last Surgery: Laparoscopy - Diagnostic,  Operative and Excision Or Fulguration, Endometriosis, Laparoscopic on 6/2/2022      HPI i    Maritza Swann is a new patient to me with an acute problem.  She was referred to us by the [facility] emergency department 4 days ago she sustained an inversion type injury to her right ankle.  She presented to [facility] emergency.  I was able to review those notes.  She was placed in a tall fracture boot and given crutches.  She is still using the crutches for assisted weightbearing.    ROS  Constitutional: Negative for fever, chills/sweats   Respiratory: Negative for shortness of breath, ANN  Cardiovascular: Negative for chest pain or pressure  GI/: Negative for diarrhea/constipation / urinary difficulty  All other systems reviewed and are negative except as per HPI.     Objective   Ortho Exam  Generalized swelling.  I do not palpate her known fracture at the distal fibula but she does have tenderness to palpation over the medial mall and the deltoid ligament complex    Last Imaging Result(s):   Dx-Ankle 3+ Views  We did weightbearing ankle x-rays today including AP, oblique, and   lateral, reviewed by myself demonstrating  distal fibular fracture without   significant shortening or malrotation she does maintain good ankle mortise   on weightbearing images.  We also performed a gravity stress, medial mall   opened a bit, measuring 8 mm          Assessment & Plan   Encounter Diagnoses:   Closed fracture of distal end of right fibula, unspecified fracture morphology, initial encounter    Acute right ankle pain    Ankle fracture, bimalleolar, closed, right, with routine healing, subsequent encounter  I discussed the case with Dr. Calle who was able to see  her today in clinic and discussed the risks and benefits of surgical management.  This was elected and we will get her on the surgery as soon as possible    Orders Placed This Encounter    Surgical Case Request: ORIF, ANKLE    DX-ANKLE 3+ VIEWS RIGHT    DX-ANKLE 2- VIEWS RIGHT       Procedures     Please note that this dictation was created using voice recognition software.  I have made every reasonable attempt to correct obvious errors but there may be errors of grammar and content that I may have overlooked prior to finalization of this note.

## 2023-08-07 ENCOUNTER — ANESTHESIA EVENT (OUTPATIENT)
Dept: SURGERY | Facility: MEDICAL CENTER | Age: 28
End: 2023-08-07
Payer: COMMERCIAL

## 2023-08-07 ENCOUNTER — APPOINTMENT (OUTPATIENT)
Dept: RADIOLOGY | Facility: MEDICAL CENTER | Age: 28
End: 2023-08-07
Attending: ORTHOPAEDIC SURGERY
Payer: COMMERCIAL

## 2023-08-07 ENCOUNTER — ANESTHESIA (OUTPATIENT)
Dept: SURGERY | Facility: MEDICAL CENTER | Age: 28
End: 2023-08-07
Payer: COMMERCIAL

## 2023-08-07 ENCOUNTER — HOSPITAL ENCOUNTER (OUTPATIENT)
Facility: MEDICAL CENTER | Age: 28
End: 2023-08-07
Attending: ORTHOPAEDIC SURGERY | Admitting: ORTHOPAEDIC SURGERY
Payer: COMMERCIAL

## 2023-08-07 VITALS
WEIGHT: 176.15 LBS | RESPIRATION RATE: 16 BRPM | OXYGEN SATURATION: 92 % | HEIGHT: 63 IN | HEART RATE: 63 BPM | SYSTOLIC BLOOD PRESSURE: 145 MMHG | TEMPERATURE: 96.7 F | DIASTOLIC BLOOD PRESSURE: 74 MMHG | BODY MASS INDEX: 31.21 KG/M2

## 2023-08-07 LAB — HCG UR QL: NEGATIVE

## 2023-08-07 PROCEDURE — 77071 MNL APPL STRS JT RADIOGRAPHY: CPT | Mod: 80ROC,RT | Performed by: STUDENT IN AN ORGANIZED HEALTH CARE EDUCATION/TRAINING PROGRAM

## 2023-08-07 PROCEDURE — C1713 ANCHOR/SCREW BN/BN,TIS/BN: HCPCS | Performed by: ORTHOPAEDIC SURGERY

## 2023-08-07 PROCEDURE — 160048 HCHG OR STATISTICAL LEVEL 1-5: Performed by: ORTHOPAEDIC SURGERY

## 2023-08-07 PROCEDURE — 64447 NJX AA&/STRD FEMORAL NRV IMG: CPT | Performed by: ORTHOPAEDIC SURGERY

## 2023-08-07 PROCEDURE — 700111 HCHG RX REV CODE 636 W/ 250 OVERRIDE (IP): Mod: UD | Performed by: ANESTHESIOLOGY

## 2023-08-07 PROCEDURE — 160002 HCHG RECOVERY MINUTES (STAT): Performed by: ORTHOPAEDIC SURGERY

## 2023-08-07 PROCEDURE — 64445 NJX AA&/STRD SCIATIC NRV IMG: CPT | Performed by: ORTHOPAEDIC SURGERY

## 2023-08-07 PROCEDURE — 81025 URINE PREGNANCY TEST: CPT

## 2023-08-07 PROCEDURE — 160036 HCHG PACU - EA ADDL 30 MINS PHASE I: Performed by: ORTHOPAEDIC SURGERY

## 2023-08-07 PROCEDURE — 77071 MNL APPL STRS JT RADIOGRAPHY: CPT | Mod: RT | Performed by: ORTHOPAEDIC SURGERY

## 2023-08-07 PROCEDURE — 700101 HCHG RX REV CODE 250: Mod: UD | Performed by: ANESTHESIOLOGY

## 2023-08-07 PROCEDURE — 27792 TREATMENT OF ANKLE FRACTURE: CPT | Mod: RT | Performed by: ORTHOPAEDIC SURGERY

## 2023-08-07 PROCEDURE — 160046 HCHG PACU - 1ST 60 MINS PHASE II: Performed by: ORTHOPAEDIC SURGERY

## 2023-08-07 PROCEDURE — 73600 X-RAY EXAM OF ANKLE: CPT | Mod: RT

## 2023-08-07 PROCEDURE — 27792 TREATMENT OF ANKLE FRACTURE: CPT | Mod: 80ROC,RT | Performed by: STUDENT IN AN ORGANIZED HEALTH CARE EDUCATION/TRAINING PROGRAM

## 2023-08-07 PROCEDURE — 160035 HCHG PACU - 1ST 60 MINS PHASE I: Performed by: ORTHOPAEDIC SURGERY

## 2023-08-07 PROCEDURE — 160029 HCHG SURGERY MINUTES - 1ST 30 MINS LEVEL 4: Performed by: ORTHOPAEDIC SURGERY

## 2023-08-07 PROCEDURE — 700105 HCHG RX REV CODE 258: Mod: JZ,UD | Performed by: ANESTHESIOLOGY

## 2023-08-07 PROCEDURE — 160025 RECOVERY II MINUTES (STATS): Performed by: ORTHOPAEDIC SURGERY

## 2023-08-07 PROCEDURE — A9270 NON-COVERED ITEM OR SERVICE: HCPCS | Mod: UD | Performed by: ANESTHESIOLOGY

## 2023-08-07 PROCEDURE — 160041 HCHG SURGERY MINUTES - EA ADDL 1 MIN LEVEL 4: Performed by: ORTHOPAEDIC SURGERY

## 2023-08-07 PROCEDURE — 700102 HCHG RX REV CODE 250 W/ 637 OVERRIDE(OP): Mod: UD | Performed by: ANESTHESIOLOGY

## 2023-08-07 PROCEDURE — 700111 HCHG RX REV CODE 636 W/ 250 OVERRIDE (IP): Mod: JZ,UD | Performed by: ANESTHESIOLOGY

## 2023-08-07 PROCEDURE — 700111 HCHG RX REV CODE 636 W/ 250 OVERRIDE (IP): Mod: JZ,UD | Performed by: NURSE PRACTITIONER

## 2023-08-07 PROCEDURE — 160009 HCHG ANES TIME/MIN: Performed by: ORTHOPAEDIC SURGERY

## 2023-08-07 DEVICE — IMPLANTABLE DEVICE: Type: IMPLANTABLE DEVICE | Site: ANKLE | Status: FUNCTIONAL

## 2023-08-07 DEVICE — PLATE BONE VARIAX TITANIUM 77 MM X 10 MM X 2 MM 16 MM X 1.3 MM FIBULA LATERAL 3 HOLE POLYAXIAL LOCK: Type: IMPLANTABLE DEVICE | Site: ANKLE | Status: FUNCTIONAL

## 2023-08-07 DEVICE — SCREW BONE VARIAX T10 FULL THREAD L14 MM OD3.5 MM FOOT ANKLE STARDRIVE NONSTERILE: Type: IMPLANTABLE DEVICE | Site: ANKLE | Status: FUNCTIONAL

## 2023-08-07 DEVICE — SCREW BONE VARIAX T10 FULL THREAD L12 MM OD3.5 MM FOOT ANKLE STARDRIVE NONSTERILE: Type: IMPLANTABLE DEVICE | Site: ANKLE | Status: FUNCTIONAL

## 2023-08-07 RX ORDER — DEXAMETHASONE SODIUM PHOSPHATE 4 MG/ML
INJECTION, SOLUTION INTRA-ARTICULAR; INTRALESIONAL; INTRAMUSCULAR; INTRAVENOUS; SOFT TISSUE PRN
Status: DISCONTINUED | OUTPATIENT
Start: 2023-08-07 | End: 2023-08-07 | Stop reason: SURG

## 2023-08-07 RX ORDER — HYDROMORPHONE HYDROCHLORIDE 1 MG/ML
0.4 INJECTION, SOLUTION INTRAMUSCULAR; INTRAVENOUS; SUBCUTANEOUS
Status: DISCONTINUED | OUTPATIENT
Start: 2023-08-07 | End: 2023-08-07 | Stop reason: HOSPADM

## 2023-08-07 RX ORDER — LABETALOL HYDROCHLORIDE 5 MG/ML
5 INJECTION, SOLUTION INTRAVENOUS
Status: DISCONTINUED | OUTPATIENT
Start: 2023-08-07 | End: 2023-08-07 | Stop reason: HOSPADM

## 2023-08-07 RX ORDER — BUPIVACAINE HYDROCHLORIDE 5 MG/ML
INJECTION, SOLUTION EPIDURAL; INTRACAUDAL PRN
Status: DISCONTINUED | OUTPATIENT
Start: 2023-08-07 | End: 2023-08-07 | Stop reason: SURG

## 2023-08-07 RX ORDER — HYDROMORPHONE HYDROCHLORIDE 1 MG/ML
0.1 INJECTION, SOLUTION INTRAMUSCULAR; INTRAVENOUS; SUBCUTANEOUS
Status: DISCONTINUED | OUTPATIENT
Start: 2023-08-07 | End: 2023-08-07 | Stop reason: HOSPADM

## 2023-08-07 RX ORDER — MIDAZOLAM HYDROCHLORIDE 1 MG/ML
INJECTION INTRAMUSCULAR; INTRAVENOUS PRN
Status: DISCONTINUED | OUTPATIENT
Start: 2023-08-07 | End: 2023-08-07 | Stop reason: SURG

## 2023-08-07 RX ORDER — MEPERIDINE HYDROCHLORIDE 25 MG/ML
6.25 INJECTION INTRAMUSCULAR; INTRAVENOUS; SUBCUTANEOUS
Status: DISCONTINUED | OUTPATIENT
Start: 2023-08-07 | End: 2023-08-07 | Stop reason: HOSPADM

## 2023-08-07 RX ORDER — OXYCODONE HCL 5 MG/5 ML
10 SOLUTION, ORAL ORAL
Status: COMPLETED | OUTPATIENT
Start: 2023-08-07 | End: 2023-08-07

## 2023-08-07 RX ORDER — HYDROMORPHONE HYDROCHLORIDE 1 MG/ML
0.2 INJECTION, SOLUTION INTRAMUSCULAR; INTRAVENOUS; SUBCUTANEOUS
Status: DISCONTINUED | OUTPATIENT
Start: 2023-08-07 | End: 2023-08-07 | Stop reason: HOSPADM

## 2023-08-07 RX ORDER — HALOPERIDOL 5 MG/ML
1 INJECTION INTRAMUSCULAR
Status: DISCONTINUED | OUTPATIENT
Start: 2023-08-07 | End: 2023-08-07 | Stop reason: HOSPADM

## 2023-08-07 RX ORDER — EPHEDRINE SULFATE 50 MG/ML
5 INJECTION, SOLUTION INTRAVENOUS
Status: DISCONTINUED | OUTPATIENT
Start: 2023-08-07 | End: 2023-08-07 | Stop reason: HOSPADM

## 2023-08-07 RX ORDER — CELECOXIB 200 MG/1
200 CAPSULE ORAL ONCE
Status: COMPLETED | OUTPATIENT
Start: 2023-08-07 | End: 2023-08-07

## 2023-08-07 RX ORDER — DIPHENHYDRAMINE HYDROCHLORIDE 50 MG/ML
12.5 INJECTION INTRAMUSCULAR; INTRAVENOUS
Status: DISCONTINUED | OUTPATIENT
Start: 2023-08-07 | End: 2023-08-07 | Stop reason: HOSPADM

## 2023-08-07 RX ORDER — ONDANSETRON 2 MG/ML
4 INJECTION INTRAMUSCULAR; INTRAVENOUS
Status: DISCONTINUED | OUTPATIENT
Start: 2023-08-07 | End: 2023-08-07 | Stop reason: HOSPADM

## 2023-08-07 RX ORDER — SCOLOPAMINE TRANSDERMAL SYSTEM 1 MG/1
1 PATCH, EXTENDED RELEASE TRANSDERMAL
Status: DISCONTINUED | OUTPATIENT
Start: 2023-08-07 | End: 2023-08-07 | Stop reason: HOSPADM

## 2023-08-07 RX ORDER — LIDOCAINE HYDROCHLORIDE 20 MG/ML
INJECTION, SOLUTION EPIDURAL; INFILTRATION; INTRACAUDAL; PERINEURAL PRN
Status: DISCONTINUED | OUTPATIENT
Start: 2023-08-07 | End: 2023-08-07 | Stop reason: SURG

## 2023-08-07 RX ORDER — ONDANSETRON 2 MG/ML
INJECTION INTRAMUSCULAR; INTRAVENOUS PRN
Status: DISCONTINUED | OUTPATIENT
Start: 2023-08-07 | End: 2023-08-07 | Stop reason: SURG

## 2023-08-07 RX ORDER — SODIUM CHLORIDE, SODIUM LACTATE, POTASSIUM CHLORIDE, CALCIUM CHLORIDE 600; 310; 30; 20 MG/100ML; MG/100ML; MG/100ML; MG/100ML
INJECTION, SOLUTION INTRAVENOUS
Status: DISCONTINUED | OUTPATIENT
Start: 2023-08-07 | End: 2023-08-07 | Stop reason: SURG

## 2023-08-07 RX ORDER — MIDAZOLAM HYDROCHLORIDE 1 MG/ML
1 INJECTION INTRAMUSCULAR; INTRAVENOUS
Status: DISCONTINUED | OUTPATIENT
Start: 2023-08-07 | End: 2023-08-07 | Stop reason: HOSPADM

## 2023-08-07 RX ORDER — CEFAZOLIN SODIUM 1 G/3ML
2 INJECTION, POWDER, FOR SOLUTION INTRAMUSCULAR; INTRAVENOUS ONCE
Status: COMPLETED | OUTPATIENT
Start: 2023-08-07 | End: 2023-08-07

## 2023-08-07 RX ORDER — ALBUTEROL SULFATE 90 UG/1
2 AEROSOL, METERED RESPIRATORY (INHALATION) EVERY 4 HOURS PRN
Status: ON HOLD | COMMUNITY
End: 2023-08-07

## 2023-08-07 RX ORDER — HYDRALAZINE HYDROCHLORIDE 20 MG/ML
5 INJECTION INTRAMUSCULAR; INTRAVENOUS
Status: DISCONTINUED | OUTPATIENT
Start: 2023-08-07 | End: 2023-08-07 | Stop reason: HOSPADM

## 2023-08-07 RX ORDER — OXYCODONE HCL 5 MG/5 ML
5 SOLUTION, ORAL ORAL
Status: COMPLETED | OUTPATIENT
Start: 2023-08-07 | End: 2023-08-07

## 2023-08-07 RX ORDER — ACETAMINOPHEN 500 MG
1000 TABLET ORAL ONCE
Status: COMPLETED | OUTPATIENT
Start: 2023-08-07 | End: 2023-08-07

## 2023-08-07 RX ADMIN — ACETAMINOPHEN 1000 MG: 500 TABLET, FILM COATED ORAL at 15:56

## 2023-08-07 RX ADMIN — PROPOFOL 100 MG: 10 INJECTION, EMULSION INTRAVENOUS at 17:10

## 2023-08-07 RX ADMIN — DEXAMETHASONE SODIUM PHOSPHATE 8 MG: 4 INJECTION INTRA-ARTICULAR; INTRALESIONAL; INTRAMUSCULAR; INTRAVENOUS; SOFT TISSUE at 17:11

## 2023-08-07 RX ADMIN — CEFAZOLIN 2 G: 1 INJECTION, POWDER, FOR SOLUTION INTRAMUSCULAR; INTRAVENOUS at 17:02

## 2023-08-07 RX ADMIN — SCOPOLAMINE 1 PATCH: 1.5 PATCH, EXTENDED RELEASE TRANSDERMAL at 16:15

## 2023-08-07 RX ADMIN — MIDAZOLAM 2 MG: 1 INJECTION, SOLUTION INTRAMUSCULAR; INTRAVENOUS at 17:06

## 2023-08-07 RX ADMIN — OXYCODONE HYDROCHLORIDE 10 MG: 5 SOLUTION ORAL at 18:30

## 2023-08-07 RX ADMIN — PROPOFOL 200 MG: 10 INJECTION, EMULSION INTRAVENOUS at 17:06

## 2023-08-07 RX ADMIN — MIDAZOLAM 2 MG: 1 INJECTION, SOLUTION INTRAMUSCULAR; INTRAVENOUS at 16:43

## 2023-08-07 RX ADMIN — ONDANSETRON 4 MG: 2 INJECTION INTRAMUSCULAR; INTRAVENOUS at 17:33

## 2023-08-07 RX ADMIN — CELECOXIB 200 MG: 200 CAPSULE ORAL at 15:56

## 2023-08-07 RX ADMIN — BUPIVACAINE HYDROCHLORIDE 20 ML: 5 INJECTION, SOLUTION EPIDURAL; INTRACAUDAL at 16:44

## 2023-08-07 RX ADMIN — PROPOFOL 100 MG: 10 INJECTION, EMULSION INTRAVENOUS at 17:08

## 2023-08-07 RX ADMIN — FENTANYL CITRATE 50 MCG: 50 INJECTION, SOLUTION INTRAMUSCULAR; INTRAVENOUS at 18:30

## 2023-08-07 RX ADMIN — LIDOCAINE HYDROCHLORIDE 50 MG: 20 INJECTION, SOLUTION EPIDURAL; INFILTRATION; INTRACAUDAL at 16:46

## 2023-08-07 RX ADMIN — SODIUM CHLORIDE, POTASSIUM CHLORIDE, SODIUM LACTATE AND CALCIUM CHLORIDE: 600; 310; 30; 20 INJECTION, SOLUTION INTRAVENOUS at 17:02

## 2023-08-07 RX ADMIN — BUPIVACAINE HYDROCHLORIDE 10 ML: 5 INJECTION, SOLUTION EPIDURAL; INTRACAUDAL at 16:47

## 2023-08-07 RX ADMIN — FENTANYL CITRATE 100 MCG: 50 INJECTION, SOLUTION INTRAMUSCULAR; INTRAVENOUS at 17:14

## 2023-08-07 RX ADMIN — LIDOCAINE HYDROCHLORIDE 50 MG: 20 INJECTION, SOLUTION EPIDURAL; INFILTRATION; INTRACAUDAL at 16:44

## 2023-08-07 ASSESSMENT — PAIN DESCRIPTION - PAIN TYPE
TYPE: SURGICAL PAIN
TYPE: ACUTE PAIN
TYPE: SURGICAL PAIN
TYPE: SURGICAL PAIN

## 2023-08-07 ASSESSMENT — PAIN SCALES - GENERAL: PAIN_LEVEL: 5

## 2023-08-07 ASSESSMENT — FIBROSIS 4 INDEX: FIB4 SCORE: 0.67

## 2023-08-07 NOTE — ANESTHESIA PREPROCEDURE EVALUATION
Case: [id number] Date/Time: 08/07/23 1600    Procedure: RIGHT ANKLE OPEN REDUCTION INTERNAL FIXATION (Right: Ankle)    Anesthesia type: General    Diagnosis: Ankle fracture, bimalleolar, closed, right, with routine healing, subsequent encounter [H46.176P]    Pre-op diagnosis: Right ankle fracture    Location: [facility] OR 12 / SURGERY [facility]    Surgeons: David Calle M.D.        MJ use.    Relevant Problems   PULMONARY   (positive) Mild intermittent asthma without complication      NEURO   (positive) Migraines      CARDIAC   (positive) Migraines       Physical Exam    Airway   Mallampati: I  TM distance: >3 FB  Neck ROM: full       Cardiovascular - normal exam     Dental - normal exam           Pulmonary   Breath sounds clear to auscultation     Abdominal   (+) obese     Neurological - normal exam                 Anesthesia Plan    ASA 2       Plan - general and peripheral nerve block     Peripheral nerve block will be post-op pain control  Airway plan will be LMA          Induction: intravenous    Postoperative Plan: Postoperative administration of opioids is intended.    Pertinent diagnostic labs and testing reviewed    Informed Consent:    Anesthetic plan and risks discussed with patient.

## 2023-08-07 NOTE — INTERVAL H&P NOTE
H&P updated. The patient was examined and no change in history or physical exam.  Cardiovascular and pulmonary exam unremarkable.

## 2023-08-07 NOTE — LETTER
August 3, 2023    Patient Name: Maritza Swann  Surgeon Name: David Calle M.D.  Surgery Facility: [facility] ([address])  Surgery Date: 8/7/2023    The time of your surgery is not final and may change up to and until the day of your surgery. You will be contacted 24-48 hours prior to your surgery date with your check-in and surgery time.    If you will not be at one of the below numbers please call the surgery scheduler at [phone]  Preferred Phone: [phone]    BEFORE YOUR SURGERY   Pre Registration and/or Lab Work must be done within and no earlier than 28 days prior to your surgery date. Please call [facility] at [phone] for an appointment as soon as possible.    The [facility] offers a class for patients undergoing a total hip/knee replacement surgery scheduled at our outpatient surgery center. Information about what to expect for preparation, the day of surgery and recovery will be given. We highly recommend bringing your support for surgery with you to the class, as well as any questions you may have. If you are interested in attending the class, please call [phone] for scheduling.     Pre op Appointment:  Instructions: Bring a list of all medications you are taking including the dosing and frequency.    DAY OF YOUR SURGERY  Nothing to eat or drink after midnight     Refrain from smoking any substance after midnight prior to surgery. Smoking may interfere with the anesthetic and frequently produces nausea during the recovery period.    Continue taking all lifesaving medications. Including the morning of your surgery with small sip of water.    Please do NOT take on the day of surgery:  Diuretics: examples- furosemide (Lasix), spironolactone, hydrochlorothiazide  ACE-inhibitors: examples- lisinopril, ramipril, enalapril  “ARBs”: examples- losartan, Olmesartan, valsartan    Please arrive at the hospital/surgery center  at the check-in time provided.     An adult will need to bring you and take you home after your surgery.     AFTER YOUR SURGERY  Post op Appointment:   Date: 08/22/23   Time: 03:45PM    With: David Calle MD   Location: [address] [place] [address]    - Therapy- Your first appointment should be 2  week(s) after your surgery. For your convenience we have 4 Physical Therapy locations: [place], [facility], [place], and [facility]. Call our office ASAP to schedule an appointment at [phone] or take the enclosed Therapy Prescription to a facility of your choice.    TIME OFF WORK  FMLA or Disability forms can be faxed directly to: [phone] or you may drop them off at [address] [place] [address]. Our office charges a $35.00 fee per form. Forms will be completed within 10 business days of drop off and payment received. For the status of your forms you may contact our disability office directly [phone].    MEDICATION INSTRUCTIONS **Please read section completely**    The following medications should be stopped a minimum of 10 days prior to surgery:  All over the counter, Supplements & Herbal medications    Anorectics: Phentermine (Adipex-P, Lomaira and Suprenza), Phentermine-topiramate (Qsymia), Bupropion-naltrexone (Contrave)    Opiod Partial Agonists/Opioid Antagonists: Buprenorphine (Subocone, Belbuca, Butrans, Probuphine Implant, Sublocade), Naltrexone (ReVia, Vivitrol), Naloxone    Amphetamines: Dextroamphetamine/Amphetamine (Adderall, Mydayis), Methylphenidate Hydrochloride (Concerta, Metadate, Methylin, Ritalin)    The following medications should be stopped 5 days prior to surgery:  Blood Thinners: Any Aspirin, Aspirin products, anti-inflammatories such as ibuprofen and any blood thinners such as Coumadin and Plavix. Please consult your prescribing physician if you are on life saving blood thinners, in regards to when to stop medications prior to surgery.     The following  medications should be stopped a minimum of 3 days prior to surgery:  PDE-5 inhibitors: Sildenafil (Viagra), Tadalafil (Cialis), Vardenafil (Levitra), Avanafil (Stendra)    MAO Inhibitors: Rasagiline (Azilect), Selegiline (Eldepryl, Emsam, Selapar), Isocarboxazid (Marplan), Phenelzine (Nardil)         COVID and INFLUENZA NOTICE TO PATIENTS    Currently, the [facility] does not routinely test patients for COVID-19 or Influenza prior to their elective surgery.  However, if patients develop the following symptoms prior to their surgery date, they should voluntarily test for COVID-19 and Influenza and notify the surgical office of their condition and results.  The symptoms warranting testing would be any two of the following:    Fever (Temp above 100.4 F)  Chills  Cough  Shortness of breath or difficulty breathing  Fatigue  Myalgias (muscle or body aches)  Headache  Sore Throat  Congestion or Runny Nose  Nausea or vomiting  Diarrhea  New loss of taste or smell    Having these symptoms prior to surgery can significantly increase your risk of morbidity and mortality under anesthesia, which may compromise your health and require a transfer to a hospital for a higher level of care.  Therefore, it is advisable to notify the surgical team of any illness in order to get information for the appropriate time to delay the surgery to minimize these preventable risks.    Your health and safety are our number one priority at the [facility], and we are thankful that you entrust us with your care.  Please help us ensure the best possible surgical and anesthetic outcome by sharing appropriate health information with our perioperative team and staff.  We look forward to taking great care of you!    Thank you for your time and consideration on this matter.    Mickey Soto MD  Medical Director  Anesthesiologist  [facility]

## 2023-08-07 NOTE — OR NURSING
Preop complete. Iv placed and infusing. Hcg negative today. All questions answered. Call light in reach and bed in low position. Belongings in am locker.

## 2023-08-08 NOTE — ANESTHESIA TIME REPORT
Anesthesia Start and Stop Event Times     Date Time Event    8/7/2023 1653 Ready for Procedure     1702 Anesthesia Start     1754 Anesthesia Stop        Responsible Staff  08/07/23    Name Role Begin End    Morgan Colon M.D. Anesth 1702 1750        Overtime Reason:  no overtime (within assigned shift)    Comments:

## 2023-08-08 NOTE — ANESTHESIA PROCEDURE NOTES
Peripheral Block    Date/Time: 8/7/2023 4:44 PM    Performed by: Scarlett Lucas M.D.  Authorized by: Scarlett Lucas M.D.    Patient Location:  Pre-op  Start Time:  8/7/2023 4:44 PM  Reason for Block: at surgeon's request and post-op pain management ONLY    patient identified, IV checked, site marked, risks and benefits discussed, surgical consent, monitors and equipment checked, pre-op evaluation and timeout performed    Patient Position:  Left lateral decubitus  Prep: ChloraPrep    Monitoring:  Heart rate, continuous pulse ox and cardiac monitor  Block Region:  Lower Extremity  Lower Extremity - Block Type:  SCIATIC nerve block, lateral approach    Laterality:  Right  Procedures: ultrasound guided  Image captured, interpreted and electronically stored.  Local Infiltration:  Lidocaine  Strength:  1 %  Dose:  3 ml  Block Type:  Single-shot  Needle Length:  100mm  Needle Gauge:  21 G  Needle Localization:  Ultrasound guidance  Injection Assessment:  Negative aspiration for heme, no paresthesia on injection, incremental injection and local visualized surrounding nerve on ultrasound  Evidence of intravascular injection: No

## 2023-08-08 NOTE — OP REPORT
08/07/23       PREOPERATIVE DIAGNOSES:  Right lateral malleolus fracture     POSTOPERATIVE DIAGNOSES:  Same    PROCEDURE PERFORMED:  Right open reduction internal fixation lateral malleolus fracture  Right external rotation stress radiograph     SURGEON:  David Calle MD     FIRST ASSISTANT: Marcio Banks MD     SECOND ASSISTANT:  Elle Manzo CFA     ANESTHESIA:  General endotracheal with local     ESTIMATED BLOOD LOSS:  None.     COMPLICATIONS:  None.    FINDINGS:  See preoperative diagnosis     POSTOPERATIVE PLAN:  Weightbearing as tolerated in a boot  Follow-up in 2 weeks     INDICATIONS:  The patient is a pleasant 27 y.o. female who has had   problems with the right ankle.  Options were discussed   including operative and nonoperative.  The patients elected to undergo operative   intervention.  The above procedure was discussed.  All questions were   answered.  Risks of surgery explained, which included but not limited to   explained wound problems, infection, nerve injury, vascular injury, need for   further surgery.  The patient understands that they could have persistent risk of    pain and need for further surgery.  The patients understands and accepts these risks   and agreed to proceed.  Site was marked by myself prior to receiving   psychotropic medicines.     PROCEDURE IN DETAIL:  The patient was brought to the operating room and    underwent general endotracheal anesthetic without complications.  Right lower   extremity was prepped and draped in standard fashion in supine position with   all appropriate padding.  Positive site verification confirmed the appropriate   extremity as well as above procedure and confirmation that the patient received   preoperative antibiotics.  The leg was elevated and tourniquet was inflated to 250 mmHg.    A lateral incision was made over the malleolus. It was carefully dissected down to avoid injury to the superficial peroneal nerve.  Fracture was  identified in the lateral malleolus.  It was cleared of all soft tissue and hematoma.  The fracture was keyed in anatomically with a pointed reduction clamp.  A Yakov lateral malleolus plate was then placed on the lateral malleolus.  Its position was confirmed on C arm.  It was transfixed with screws proximal and distal to the fracture.  The fracture remained anatomic on direct visualization.  C arm imaging confirmed satisfactory position of internal fixation and alignment of the fracture.  An external rotation stress radiograph was performed and demonstrated no widening of the medial clear space.  Wound was irrigated out the copious irrigation was closed in a layered fashion using 3-0 Vicryl and 3-0 nylon.     Sterile dressings were applied.  Tourniquet was released.  Patient was transferred to the recovery room in good condition.    Utilization of Dr. Banks was necessary for patient positioning needing holding retracting wound closure and dressing placement.  The assistant was present throughout the entire procedure.

## 2023-08-08 NOTE — ANESTHESIA POSTPROCEDURE EVALUATION
Patient: Maritza Swann    Procedure Summary     Date: 08/07/23 Room / Location: [place] [address] / SURGERY [facility]    Anesthesia Start: 1702 Anesthesia Stop: 1754    Procedure: RIGHT ANKLE OPEN REDUCTION INTERNAL FIXATION (Right: Ankle) Diagnosis:       Ankle fracture, bimalleolar, closed, right, with routine healing, subsequent encounter      (Right ankle fracture)    Surgeons: David Calle M.D. Responsible Provider: Morgan Colon M.D.    Anesthesia Type: general, peripheral nerve block ASA Status: 2          Final Anesthesia Type: general, peripheral nerve block  Last vitals  BP   Blood Pressure: (!) 146/74    Temp   36.4 °C (97.6 °F)    Pulse   (!) 103   Resp   14    SpO2   100 %      Anesthesia Post Evaluation    Patient location during evaluation: PACU  Patient participation: complete - patient participated  Level of consciousness: awake and alert  Pain score: 5    Airway patency: patent  Anesthetic complications: no  Cardiovascular status: hemodynamically stable  Respiratory status: acceptable  Hydration status: euvolemic    PONV: none          No notable events documented.     Nurse Pain Score: 5 (NPRS)

## 2023-08-08 NOTE — DISCHARGE INSTRUCTIONS
HOME CARE INSTRUCTIONS    ACTIVITY: Rest and take it easy for the first 24 hours.  A responsible adult is recommended to remain with you during that time.  It is normal to feel sleepy.  We encourage you to not do anything that requires balance, judgment or coordination.    FOR 24 HOURS DO NOT:  Drive, operate machinery or run household appliances.  Drink beer or alcoholic beverages.  Make important decisions or sign legal documents.    SPECIAL INSTRUCTIONS: non weight bearing, right leg in boot    DIET: To avoid nausea, slowly advance diet as tolerated, avoiding spicy or greasy foods for the first day.  Add more substantial food to your diet according to your physician's instructions.   INCREASE FLUIDS AND FIBER TO AVOID CONSTIPATION.    SURGICAL DRESSING/BATHING: keep dressing clean and dry    MEDICATIONS: Resume taking daily medication.  Take prescribed pain medication with food.  If no medication is prescribed, you may take non-aspirin pain medication if needed.  PAIN MEDICATION CAN BE VERY CONSTIPATING.  Take a stool softener or laxative such as senokot, pericolace, or milk of magnesia if needed.    Last pain medication given 1000 mg of Tylenol at 3:56pm, and 10 mg Roxicodone at 6:30pm.    A follow-up appointment should be arranged with your doctor in 1-2 weeks; call to schedule.    You should CALL YOUR PHYSICIAN if you develop:  Fever greater than 101 degrees F.  Pain not relieved by medication, or persistent nausea or vomiting.  Excessive bleeding (blood soaking through dressing) or unexpected drainage from the wound.  Extreme redness or swelling around the incision site, drainage of pus or foul smelling drainage.  Inability to urinate or empty your bladder within 8 hours.  Problems with breathing or chest pain.    You should call 911 if you develop problems with breathing or chest pain.    If you are unable to contact your doctor or surgical center, you should go to the nearest emergency room or urgent care  center.  Physician's telephone #: [phone]    MILD FLU-LIKE SYMPTOMS ARE NORMAL.  YOU MAY EXPERIENCE GENERALIZED MUSCLE ACHES, THROAT IRRITATION, HEADACHE AND/OR SOME NAUSEA.    If any questions arise, call your doctor.  If your doctor is not available, please feel free to call the Surgical Center at [phone].  The Center is open Monday through Friday from 7AM to 7PM.      A registered nurse may call you a few days after your surgery to see how you are doing after your procedure.    You may also receive a survey in the mail within the next two weeks and we ask that you take a few moments to complete the survey and return it to us.  Our goal is to provide you with very good care and we value your comments.     Depression / Suicide Risk    As you are discharged from this [facility] facility, it is important to learn how to keep safe from harming yourself.    Recognize the warning signs:  Abrupt changes in personality, positive or negative- including increase in energy   Giving away possessions  Change in eating patterns- significant weight changes-  positive or negative  Change in sleeping patterns- unable to sleep or sleeping all the time   Unwillingness or inability to communicate  Depression  Unusual sadness, discouragement and loneliness  Talk of wanting to die  Neglect of personal appearance   Rebelliousness- reckless behavior  Withdrawal from people/activities they love  Confusion- inability to concentrate     If you or a loved one observes any of these behaviors or has concerns about self-harm, here's what you can do:  Talk about it- your feelings and reasons for harming yourself  Remove any means that you might use to hurt yourself (examples: pills, rope, extension cords, firearm)  Get professional help from the community (Mental Health, Substance Abuse, psychological counseling)  Do not be alone:Call your Safe Contact- someone whom you trust who will be there for you.  Call your local CRISIS  HOTLINE 911-4546 or 368-606-5965  Call your local Children's Mobile Crisis Response Team [place] (933) 422-6148 or www.[employer]  Call the toll free National Suicide Prevention Hotlines   National Suicide Prevention Lifeline 918-596-ZVVU (4630)  National [employer] Line Network 800-SUICIDE (918-7945)    I acknowledge receipt and understanding of these Home Care instructions.

## 2023-08-08 NOTE — OR NURSING
1948 - Pt's vital signs are stable, pt is alert and reporting no pain at this time. Dressing is clean, dry and intact - boot in place. Pt has no complaints of nausea or vomiting .     2012 - Pt voided. Discharge instructions including prescriptions and follow up appointments were discussed with pt and . Pt's IV was discontinued and pt was given all belongings. Pt had no other questions. Pt pushed out via wheelchair.

## 2023-08-08 NOTE — ANESTHESIA PROCEDURE NOTES
Peripheral Block    Date/Time: 8/7/2023 4:44 PM    Performed by: Scarlett Lucas M.D.  Authorized by: Scarlett Lucas M.D.    Patient Location:  Pre-op  Start Time:  8/7/2023 4:44 PM  Reason for Block: at surgeon's request and post-op pain management ONLY    patient identified, IV checked, site marked, risks and benefits discussed, surgical consent, monitors and equipment checked, pre-op evaluation and timeout performed    Patient Position:  Supine  Prep: ChloraPrep    Monitoring:  Heart rate, continuous pulse ox and cardiac monitor  Block Region:  Lower Extremity  Lower Extremity - Block Type:  Selective FEMORAL nerve block at the Adductor Canal    Laterality:  Right  Procedures: ultrasound guided  Image captured, interpreted and electronically stored.  Local Infiltration:  Lidocaine  Strength:  1 %  Dose:  3 ml  Block Type:  Single-shot  Needle Length:  100mm  Needle Gauge:  21 G  Needle Localization:  Ultrasound guidance  Injection Assessment:  Negative aspiration for heme, no paresthesia on injection, incremental injection and local visualized surrounding nerve on ultrasound  Evidence of intravascular injection: No

## 2023-08-08 NOTE — OR NURSING
1750: Pt arrived via gurney with anesthesia and RN. VSS. ABHI dressing. Boot in place. Orders reviewed and initiated.   1938: Report given to TIAGO Silva. All questions answered. VSS. Boot in place. CMS intact. No patient distress. Pain medicated per MAR. Alert and oriented x4. Pt transported to phase II in stable condition on room air with no personal belongings. Family updated.

## 2023-08-08 NOTE — ANESTHESIA PROCEDURE NOTES
Airway    Date/Time: 8/7/2023 5:07 PM    Performed by: Morgan Colon M.D.  Authorized by: Morgan Colon M.D.    Location:  OR  Urgency:  Elective  Indications for Airway Management:  Anesthesia      Spontaneous Ventilation: absent    Sedation Level:  Deep  Preoxygenated: Yes    Final Airway Type:  Supraglottic airway  Final Supraglottic Airway:  Standard LMA    SGA Size:  3  Number of Attempts at Approach:  1   Atraumatic insertion, good seal

## 2025-04-21 ENCOUNTER — APPOINTMENT (OUTPATIENT)
Dept: ADMISSIONS | Facility: MEDICAL CENTER | Age: 30
End: 2025-04-21
Attending: SPECIALIST
Payer: COMMERCIAL

## 2025-04-28 ENCOUNTER — PRE-ADMISSION TESTING (OUTPATIENT)
Dept: ADMISSIONS | Facility: MEDICAL CENTER | Age: 30
End: 2025-04-28
Attending: SPECIALIST
Payer: COMMERCIAL

## 2025-04-28 DIAGNOSIS — Z01.812 PRE-OPERATIVE LABORATORY EXAMINATION: ICD-10-CM

## 2025-04-28 DIAGNOSIS — Z01.811 PRE-OPERATIVE RESPIRATORY EXAMINATION: ICD-10-CM

## 2025-04-28 DIAGNOSIS — Z01.810 PRE-OPERATIVE CARDIOVASCULAR EXAMINATION: ICD-10-CM

## 2025-04-28 NOTE — PREADMIT AVS NOTE
Current Medications   Medication Instructions    CALCIUM-VITAMIN D PO Stop 7 days before surgery    MAGNESIUM CITRATE PO Stop 7 days before surgery    Pyridoxine HCl (VITAMIN B-6 PO) Stop 7 days before surgery    ALBUTEROL INH Continue taking medication as prescribed, as needed.    gabapentin (NEURONTIN) 300 MG Cap Continue taking medication as prescribed, as needed.    hydrOXYzine pamoate (VISTARIL) 50 MG Cap Continue taking medication as prescribed, as needed.    acetaminophen (TYLENOL) 500 MG Tab Continue taking medication as prescribed, as needed.    prazosin (MINIPRESS) 2 MG Cap Continue taking medication as prescribed.    venlafaxine (EFFEXOR-XR) 150 MG extended-release capsule Continue taking medication as prescribed.    multivitamin Tab Stop 7 days before surgery    ibuprofen (MOTRIN) 200 MG Tab Stop 5 days before surgery

## 2025-04-29 ENCOUNTER — PRE-ADMISSION TESTING (OUTPATIENT)
Dept: ADMISSIONS | Facility: MEDICAL CENTER | Age: 30
End: 2025-04-29
Attending: SPECIALIST
Payer: COMMERCIAL

## 2025-04-29 ENCOUNTER — HOSPITAL ENCOUNTER (OUTPATIENT)
Dept: RADIOLOGY | Facility: MEDICAL CENTER | Age: 30
End: 2025-04-29
Attending: SPECIALIST
Payer: COMMERCIAL

## 2025-04-29 DIAGNOSIS — Z01.812 PRE-OPERATIVE LABORATORY EXAMINATION: ICD-10-CM

## 2025-04-29 DIAGNOSIS — Z01.810 PRE-OPERATIVE CARDIOVASCULAR EXAMINATION: ICD-10-CM

## 2025-04-29 DIAGNOSIS — Z01.811 PRE-OPERATIVE RESPIRATORY EXAMINATION: ICD-10-CM

## 2025-04-29 LAB
ABO GROUP BLD: NORMAL
ALBUMIN SERPL BCP-MCNC: 4.2 G/DL (ref 3.2–4.9)
ALBUMIN/GLOB SERPL: 1.4 G/DL
ALP SERPL-CCNC: 74 U/L (ref 30–99)
ALT SERPL-CCNC: 18 U/L (ref 2–50)
ANION GAP SERPL CALC-SCNC: 11 MMOL/L (ref 7–16)
APTT PPP: 26.3 SEC (ref 24.7–36)
AST SERPL-CCNC: 21 U/L (ref 12–45)
BASOPHILS # BLD AUTO: 0.3 % (ref 0–1.8)
BASOPHILS # BLD: 0.03 K/UL (ref 0–0.12)
BILIRUB SERPL-MCNC: 0.7 MG/DL (ref 0.1–1.5)
BLD GP AB SCN SERPL QL: NORMAL
BUN SERPL-MCNC: 26 MG/DL (ref 8–22)
CALCIUM ALBUM COR SERPL-MCNC: 9.6 MG/DL (ref 8.5–10.5)
CALCIUM SERPL-MCNC: 9.8 MG/DL (ref 8.5–10.5)
CHLORIDE SERPL-SCNC: 100 MMOL/L (ref 96–112)
CO2 SERPL-SCNC: 24 MMOL/L (ref 20–33)
CREAT SERPL-MCNC: 0.88 MG/DL (ref 0.5–1.4)
EKG IMPRESSION: NORMAL
EOSINOPHIL # BLD AUTO: 0.05 K/UL (ref 0–0.51)
EOSINOPHIL NFR BLD: 0.5 % (ref 0–6.9)
ERYTHROCYTE [DISTWIDTH] IN BLOOD BY AUTOMATED COUNT: 47.2 FL (ref 35.9–50)
GFR SERPLBLD CREATININE-BSD FMLA CKD-EPI: 90 ML/MIN/1.73 M 2
GLOBULIN SER CALC-MCNC: 3 G/DL (ref 1.9–3.5)
GLUCOSE SERPL-MCNC: 82 MG/DL (ref 65–99)
HCT VFR BLD AUTO: 41.2 % (ref 37–47)
HGB BLD-MCNC: 13.7 G/DL (ref 12–16)
IMM GRANULOCYTES # BLD AUTO: 0.03 K/UL (ref 0–0.11)
IMM GRANULOCYTES NFR BLD AUTO: 0.3 % (ref 0–0.9)
INR PPP: 0.93 (ref 0.87–1.13)
LYMPHOCYTES # BLD AUTO: 2.04 K/UL (ref 1–4.8)
LYMPHOCYTES NFR BLD: 19.2 % (ref 22–41)
MCH RBC QN AUTO: 33.7 PG (ref 27–33)
MCHC RBC AUTO-ENTMCNC: 33.3 G/DL (ref 32.2–35.5)
MCV RBC AUTO: 101.2 FL (ref 81.4–97.8)
MONOCYTES # BLD AUTO: 0.63 K/UL (ref 0–0.85)
MONOCYTES NFR BLD AUTO: 5.9 % (ref 0–13.4)
NEUTROPHILS # BLD AUTO: 7.87 K/UL (ref 1.82–7.42)
NEUTROPHILS NFR BLD: 73.8 % (ref 44–72)
NRBC # BLD AUTO: 0 K/UL
NRBC BLD-RTO: 0 /100 WBC (ref 0–0.2)
PLATELET # BLD AUTO: 230 K/UL (ref 164–446)
PMV BLD AUTO: 12.1 FL (ref 9–12.9)
POTASSIUM SERPL-SCNC: 4 MMOL/L (ref 3.6–5.5)
PROT SERPL-MCNC: 7.2 G/DL (ref 6–8.2)
PROTHROMBIN TIME: 12.4 SEC (ref 12–14.6)
RBC # BLD AUTO: 4.07 M/UL (ref 4.2–5.4)
RH BLD: NORMAL
SODIUM SERPL-SCNC: 135 MMOL/L (ref 135–145)
WBC # BLD AUTO: 10.7 K/UL (ref 4.8–10.8)

## 2025-04-29 PROCEDURE — 85730 THROMBOPLASTIN TIME PARTIAL: CPT

## 2025-04-29 PROCEDURE — 36415 COLL VENOUS BLD VENIPUNCTURE: CPT

## 2025-04-29 PROCEDURE — 93010 ELECTROCARDIOGRAM REPORT: CPT | Performed by: INTERNAL MEDICINE

## 2025-04-29 PROCEDURE — 71045 X-RAY EXAM CHEST 1 VIEW: CPT

## 2025-04-29 PROCEDURE — 80053 COMPREHEN METABOLIC PANEL: CPT

## 2025-04-29 PROCEDURE — 85025 COMPLETE CBC W/AUTO DIFF WBC: CPT

## 2025-04-29 PROCEDURE — 86901 BLOOD TYPING SEROLOGIC RH(D): CPT

## 2025-04-29 PROCEDURE — 86900 BLOOD TYPING SEROLOGIC ABO: CPT

## 2025-04-29 PROCEDURE — 86850 RBC ANTIBODY SCREEN: CPT

## 2025-04-29 PROCEDURE — 93005 ELECTROCARDIOGRAM TRACING: CPT | Mod: TC

## 2025-04-29 PROCEDURE — 85610 PROTHROMBIN TIME: CPT

## 2025-05-01 ENCOUNTER — ANESTHESIA EVENT (OUTPATIENT)
Dept: SURGERY | Facility: MEDICAL CENTER | Age: 30
End: 2025-05-01
Payer: COMMERCIAL

## 2025-05-02 ENCOUNTER — APPOINTMENT (OUTPATIENT)
Dept: RADIOLOGY | Facility: MEDICAL CENTER | Age: 30
End: 2025-05-02
Payer: COMMERCIAL

## 2025-05-02 ENCOUNTER — ANESTHESIA (OUTPATIENT)
Dept: SURGERY | Facility: MEDICAL CENTER | Age: 30
End: 2025-05-02
Payer: COMMERCIAL

## 2025-05-02 ENCOUNTER — HOSPITAL ENCOUNTER (OUTPATIENT)
Facility: MEDICAL CENTER | Age: 30
End: 2025-05-03
Attending: SPECIALIST | Admitting: SPECIALIST
Payer: COMMERCIAL

## 2025-05-02 PROBLEM — R79.81 LOW OXYGEN SATURATION: Status: ACTIVE | Noted: 2025-05-02

## 2025-05-02 PROBLEM — G43.909 MIGRAINES: Status: ACTIVE | Noted: 2025-05-02

## 2025-05-02 LAB
HCG UR QL: NEGATIVE
HOLDING TUBE BB 8507: NORMAL
PATHOLOGY CONSULT NOTE: NORMAL

## 2025-05-02 PROCEDURE — 160031 HCHG SURGERY MINUTES - 1ST 30 MINS LEVEL 5: Performed by: SPECIALIST

## 2025-05-02 PROCEDURE — G0378 HOSPITAL OBSERVATION PER HR: HCPCS

## 2025-05-02 PROCEDURE — 160035 HCHG PACU - 1ST 60 MINS PHASE I: Performed by: SPECIALIST

## 2025-05-02 PROCEDURE — 160009 HCHG ANES TIME/MIN: Performed by: SPECIALIST

## 2025-05-02 PROCEDURE — 64450 NJX AA&/STRD OTHER PN/BRANCH: CPT | Performed by: SPECIALIST

## 2025-05-02 PROCEDURE — 160036 HCHG PACU - EA ADDL 30 MINS PHASE I: Performed by: SPECIALIST

## 2025-05-02 PROCEDURE — 700111 HCHG RX REV CODE 636 W/ 250 OVERRIDE (IP): Mod: JZ | Performed by: STUDENT IN AN ORGANIZED HEALTH CARE EDUCATION/TRAINING PROGRAM

## 2025-05-02 PROCEDURE — 502714 HCHG ROBOTIC SURGERY SERVICES: Performed by: SPECIALIST

## 2025-05-02 PROCEDURE — 74018 RADEX ABDOMEN 1 VIEW: CPT

## 2025-05-02 PROCEDURE — 700111 HCHG RX REV CODE 636 W/ 250 OVERRIDE (IP): Performed by: STUDENT IN AN ORGANIZED HEALTH CARE EDUCATION/TRAINING PROGRAM

## 2025-05-02 PROCEDURE — 700111 HCHG RX REV CODE 636 W/ 250 OVERRIDE (IP): Mod: JZ | Performed by: SPECIALIST

## 2025-05-02 PROCEDURE — 700102 HCHG RX REV CODE 250 W/ 637 OVERRIDE(OP): Performed by: STUDENT IN AN ORGANIZED HEALTH CARE EDUCATION/TRAINING PROGRAM

## 2025-05-02 PROCEDURE — 700101 HCHG RX REV CODE 250: Performed by: STUDENT IN AN ORGANIZED HEALTH CARE EDUCATION/TRAINING PROGRAM

## 2025-05-02 PROCEDURE — 160042 HCHG SURGERY MINUTES - EA ADDL 1 MIN LEVEL 5: Performed by: SPECIALIST

## 2025-05-02 PROCEDURE — 88307 TISSUE EXAM BY PATHOLOGIST: CPT | Performed by: PATHOLOGY

## 2025-05-02 PROCEDURE — 160015 HCHG STAT PREOP MINUTES: Performed by: SPECIALIST

## 2025-05-02 PROCEDURE — A9270 NON-COVERED ITEM OR SERVICE: HCPCS | Performed by: STUDENT IN AN ORGANIZED HEALTH CARE EDUCATION/TRAINING PROGRAM

## 2025-05-02 PROCEDURE — 160002 HCHG RECOVERY MINUTES (STAT): Performed by: SPECIALIST

## 2025-05-02 PROCEDURE — 700105 HCHG RX REV CODE 258: Performed by: STUDENT IN AN ORGANIZED HEALTH CARE EDUCATION/TRAINING PROGRAM

## 2025-05-02 PROCEDURE — 36415 COLL VENOUS BLD VENIPUNCTURE: CPT

## 2025-05-02 PROCEDURE — 81025 URINE PREGNANCY TEST: CPT

## 2025-05-02 PROCEDURE — 88307 TISSUE EXAM BY PATHOLOGIST: CPT | Mod: 26 | Performed by: PATHOLOGY

## 2025-05-02 PROCEDURE — 700101 HCHG RX REV CODE 250: Performed by: SPECIALIST

## 2025-05-02 PROCEDURE — 160048 HCHG OR STATISTICAL LEVEL 1-5: Performed by: SPECIALIST

## 2025-05-02 PROCEDURE — 96374 THER/PROPH/DIAG INJ IV PUSH: CPT

## 2025-05-02 RX ORDER — KETOROLAC TROMETHAMINE 15 MG/ML
INJECTION, SOLUTION INTRAMUSCULAR; INTRAVENOUS PRN
Status: DISCONTINUED | OUTPATIENT
Start: 2025-05-02 | End: 2025-05-02 | Stop reason: SURG

## 2025-05-02 RX ORDER — OXYCODONE HCL 5 MG/5 ML
10 SOLUTION, ORAL ORAL
Status: COMPLETED | OUTPATIENT
Start: 2025-05-02 | End: 2025-05-02

## 2025-05-02 RX ORDER — OXYCODONE AND ACETAMINOPHEN 10; 325 MG/1; MG/1
1 TABLET ORAL EVERY 4 HOURS PRN
Refills: 0 | Status: DISCONTINUED | OUTPATIENT
Start: 2025-05-02 | End: 2025-05-02

## 2025-05-02 RX ORDER — ONDANSETRON 2 MG/ML
4 INJECTION INTRAMUSCULAR; INTRAVENOUS
Status: DISCONTINUED | OUTPATIENT
Start: 2025-05-02 | End: 2025-05-02

## 2025-05-02 RX ORDER — ACETAMINOPHEN 500 MG
1000 TABLET ORAL ONCE
Status: COMPLETED | OUTPATIENT
Start: 2025-05-02 | End: 2025-05-02

## 2025-05-02 RX ORDER — KETOROLAC TROMETHAMINE 15 MG/ML
15 INJECTION, SOLUTION INTRAMUSCULAR; INTRAVENOUS ONCE
Status: COMPLETED | OUTPATIENT
Start: 2025-05-02 | End: 2025-05-02

## 2025-05-02 RX ORDER — MIDAZOLAM HYDROCHLORIDE 1 MG/ML
1 INJECTION INTRAMUSCULAR; INTRAVENOUS
Status: DISCONTINUED | OUTPATIENT
Start: 2025-05-02 | End: 2025-05-02

## 2025-05-02 RX ORDER — LIDOCAINE HYDROCHLORIDE 20 MG/ML
INJECTION, SOLUTION EPIDURAL; INFILTRATION; INTRACAUDAL; PERINEURAL PRN
Status: DISCONTINUED | OUTPATIENT
Start: 2025-05-02 | End: 2025-05-02 | Stop reason: SURG

## 2025-05-02 RX ORDER — IPRATROPIUM BROMIDE AND ALBUTEROL SULFATE 2.5; .5 MG/3ML; MG/3ML
3 SOLUTION RESPIRATORY (INHALATION)
Status: DISCONTINUED | OUTPATIENT
Start: 2025-05-02 | End: 2025-05-02

## 2025-05-02 RX ORDER — EPHEDRINE SULFATE 50 MG/ML
5 INJECTION, SOLUTION INTRAVENOUS
Status: DISCONTINUED | OUTPATIENT
Start: 2025-05-02 | End: 2025-05-02

## 2025-05-02 RX ORDER — HYDRALAZINE HYDROCHLORIDE 20 MG/ML
5 INJECTION INTRAMUSCULAR; INTRAVENOUS
Status: DISCONTINUED | OUTPATIENT
Start: 2025-05-02 | End: 2025-05-02

## 2025-05-02 RX ORDER — OXYCODONE HCL 5 MG/5 ML
5 SOLUTION, ORAL ORAL
Status: COMPLETED | OUTPATIENT
Start: 2025-05-02 | End: 2025-05-02

## 2025-05-02 RX ORDER — HYDROMORPHONE HYDROCHLORIDE 1 MG/ML
0.4 INJECTION, SOLUTION INTRAMUSCULAR; INTRAVENOUS; SUBCUTANEOUS
Status: DISCONTINUED | OUTPATIENT
Start: 2025-05-02 | End: 2025-05-02

## 2025-05-02 RX ORDER — IBUPROFEN 800 MG/1
800 TABLET, FILM COATED ORAL 3 TIMES DAILY PRN
Status: DISCONTINUED | OUTPATIENT
Start: 2025-05-06 | End: 2025-05-03 | Stop reason: HOSPADM

## 2025-05-02 RX ORDER — LABETALOL HYDROCHLORIDE 5 MG/ML
5 INJECTION, SOLUTION INTRAVENOUS
Status: DISCONTINUED | OUTPATIENT
Start: 2025-05-02 | End: 2025-05-02

## 2025-05-02 RX ORDER — HYDROMORPHONE HYDROCHLORIDE 2 MG/ML
INJECTION, SOLUTION INTRAMUSCULAR; INTRAVENOUS; SUBCUTANEOUS PRN
Status: DISCONTINUED | OUTPATIENT
Start: 2025-05-02 | End: 2025-05-02 | Stop reason: SURG

## 2025-05-02 RX ORDER — SODIUM CHLORIDE, SODIUM LACTATE, POTASSIUM CHLORIDE, CALCIUM CHLORIDE 600; 310; 30; 20 MG/100ML; MG/100ML; MG/100ML; MG/100ML
INJECTION, SOLUTION INTRAVENOUS
Status: DISCONTINUED | OUTPATIENT
Start: 2025-05-02 | End: 2025-05-02 | Stop reason: SURG

## 2025-05-02 RX ORDER — ROCURONIUM BROMIDE 10 MG/ML
INJECTION, SOLUTION INTRAVENOUS PRN
Status: DISCONTINUED | OUTPATIENT
Start: 2025-05-02 | End: 2025-05-02 | Stop reason: SURG

## 2025-05-02 RX ORDER — ONDANSETRON 2 MG/ML
4 INJECTION INTRAMUSCULAR; INTRAVENOUS EVERY 6 HOURS PRN
Status: DISCONTINUED | OUTPATIENT
Start: 2025-05-02 | End: 2025-05-03 | Stop reason: HOSPADM

## 2025-05-02 RX ORDER — DEXMEDETOMIDINE HYDROCHLORIDE 100 UG/ML
INJECTION, SOLUTION INTRAVENOUS PRN
Status: DISCONTINUED | OUTPATIENT
Start: 2025-05-02 | End: 2025-05-02 | Stop reason: SURG

## 2025-05-02 RX ORDER — KETOROLAC TROMETHAMINE 15 MG/ML
15 INJECTION, SOLUTION INTRAMUSCULAR; INTRAVENOUS EVERY 6 HOURS
Status: DISCONTINUED | OUTPATIENT
Start: 2025-05-03 | End: 2025-05-03 | Stop reason: HOSPADM

## 2025-05-02 RX ORDER — HALOPERIDOL 5 MG/ML
1 INJECTION INTRAMUSCULAR
Status: DISCONTINUED | OUTPATIENT
Start: 2025-05-02 | End: 2025-05-02

## 2025-05-02 RX ORDER — MIDAZOLAM HYDROCHLORIDE 1 MG/ML
INJECTION INTRAMUSCULAR; INTRAVENOUS PRN
Status: DISCONTINUED | OUTPATIENT
Start: 2025-05-02 | End: 2025-05-02 | Stop reason: SURG

## 2025-05-02 RX ORDER — ALBUTEROL SULFATE 90 UG/1
2 INHALANT RESPIRATORY (INHALATION) EVERY 6 HOURS PRN
COMMUNITY

## 2025-05-02 RX ORDER — SCOPOLAMINE 1 MG/3D
1 PATCH, EXTENDED RELEASE TRANSDERMAL
Status: DISCONTINUED | OUTPATIENT
Start: 2025-05-02 | End: 2025-05-03 | Stop reason: HOSPADM

## 2025-05-02 RX ORDER — CEFAZOLIN SODIUM 1 G/3ML
INJECTION, POWDER, FOR SOLUTION INTRAMUSCULAR; INTRAVENOUS PRN
Status: DISCONTINUED | OUTPATIENT
Start: 2025-05-02 | End: 2025-05-02 | Stop reason: SURG

## 2025-05-02 RX ORDER — DEXAMETHASONE SODIUM PHOSPHATE 4 MG/ML
INJECTION, SOLUTION INTRA-ARTICULAR; INTRALESIONAL; INTRAMUSCULAR; INTRAVENOUS; SOFT TISSUE
Status: COMPLETED | OUTPATIENT
Start: 2025-05-02 | End: 2025-05-02

## 2025-05-02 RX ORDER — HYDROMORPHONE HYDROCHLORIDE 1 MG/ML
0.2 INJECTION, SOLUTION INTRAMUSCULAR; INTRAVENOUS; SUBCUTANEOUS
Status: DISCONTINUED | OUTPATIENT
Start: 2025-05-02 | End: 2025-05-02

## 2025-05-02 RX ORDER — DIPHENHYDRAMINE HYDROCHLORIDE 50 MG/ML
12.5 INJECTION, SOLUTION INTRAMUSCULAR; INTRAVENOUS
Status: DISCONTINUED | OUTPATIENT
Start: 2025-05-02 | End: 2025-05-02

## 2025-05-02 RX ORDER — BUPIVACAINE HYDROCHLORIDE AND EPINEPHRINE 2.5; 5 MG/ML; UG/ML
INJECTION, SOLUTION EPIDURAL; INFILTRATION; INTRACAUDAL; PERINEURAL
Status: DISCONTINUED | OUTPATIENT
Start: 2025-05-02 | End: 2025-05-02 | Stop reason: HOSPADM

## 2025-05-02 RX ORDER — HYDROMORPHONE HYDROCHLORIDE 1 MG/ML
0.1 INJECTION, SOLUTION INTRAMUSCULAR; INTRAVENOUS; SUBCUTANEOUS
Status: DISCONTINUED | OUTPATIENT
Start: 2025-05-02 | End: 2025-05-02

## 2025-05-02 RX ORDER — ONDANSETRON 2 MG/ML
INJECTION INTRAMUSCULAR; INTRAVENOUS PRN
Status: DISCONTINUED | OUTPATIENT
Start: 2025-05-02 | End: 2025-05-02 | Stop reason: SURG

## 2025-05-02 RX ORDER — MAGNESIUM SULFATE HEPTAHYDRATE 40 MG/ML
INJECTION, SOLUTION INTRAVENOUS PRN
Status: DISCONTINUED | OUTPATIENT
Start: 2025-05-02 | End: 2025-05-02 | Stop reason: SURG

## 2025-05-02 RX ORDER — BUPIVACAINE HYDROCHLORIDE 2.5 MG/ML
INJECTION, SOLUTION EPIDURAL; INFILTRATION; INTRACAUDAL; PERINEURAL
Status: COMPLETED | OUTPATIENT
Start: 2025-05-02 | End: 2025-05-02

## 2025-05-02 RX ADMIN — HYDROMORPHONE HYDROCHLORIDE 0.2 MG: 1 INJECTION, SOLUTION INTRAMUSCULAR; INTRAVENOUS; SUBCUTANEOUS at 16:30

## 2025-05-02 RX ADMIN — ACETAMINOPHEN 1000 MG: 500 TABLET ORAL at 12:45

## 2025-05-02 RX ADMIN — KETOROLAC TROMETHAMINE 15 MG: 15 INJECTION, SOLUTION INTRAMUSCULAR; INTRAVENOUS at 14:16

## 2025-05-02 RX ADMIN — OXYCODONE HYDROCHLORIDE 10 MG: 5 SOLUTION ORAL at 15:07

## 2025-05-02 RX ADMIN — MAGNESIUM SULFATE HEPTAHYDRATE 4 G: 2 INJECTION, SOLUTION INTRAVENOUS at 13:41

## 2025-05-02 RX ADMIN — CEFAZOLIN 2 G: 1 INJECTION, POWDER, FOR SOLUTION INTRAMUSCULAR; INTRAVENOUS at 13:18

## 2025-05-02 RX ADMIN — BUPIVACAINE HYDROCHLORIDE 30 ML: 2.5 INJECTION, SOLUTION EPIDURAL; INFILTRATION; INTRACAUDAL at 12:35

## 2025-05-02 RX ADMIN — SUGAMMADEX 200 MG: 100 INJECTION, SOLUTION INTRAVENOUS at 14:26

## 2025-05-02 RX ADMIN — PROPOFOL 200 MG: 10 INJECTION, EMULSION INTRAVENOUS at 13:11

## 2025-05-02 RX ADMIN — KETOROLAC TROMETHAMINE 15 MG: 15 INJECTION, SOLUTION INTRAMUSCULAR; INTRAVENOUS at 15:08

## 2025-05-02 RX ADMIN — ONDANSETRON 4 MG: 2 INJECTION INTRAMUSCULAR; INTRAVENOUS at 14:16

## 2025-05-02 RX ADMIN — SCOPOLAMINE 1 PATCH: 1.5 PATCH, EXTENDED RELEASE TRANSDERMAL at 12:45

## 2025-05-02 RX ADMIN — SODIUM CHLORIDE, POTASSIUM CHLORIDE, SODIUM LACTATE AND CALCIUM CHLORIDE: 600; 310; 30; 20 INJECTION, SOLUTION INTRAVENOUS at 13:00

## 2025-05-02 RX ADMIN — KETOROLAC TROMETHAMINE 15 MG: 15 INJECTION, SOLUTION INTRAMUSCULAR; INTRAVENOUS at 23:44

## 2025-05-02 RX ADMIN — DEXMEDETOMIDINE 30 MCG: 100 INJECTION, SOLUTION INTRAVENOUS at 13:00

## 2025-05-02 RX ADMIN — HYDROMORPHONE HYDROCHLORIDE 0.2 MG: 1 INJECTION, SOLUTION INTRAMUSCULAR; INTRAVENOUS; SUBCUTANEOUS at 16:18

## 2025-05-02 RX ADMIN — DEXAMETHASONE SODIUM PHOSPHATE 8 MG: 4 INJECTION INTRA-ARTICULAR; INTRALESIONAL; INTRAMUSCULAR; INTRAVENOUS; SOFT TISSUE at 12:35

## 2025-05-02 RX ADMIN — LIDOCAINE HYDROCHLORIDE 100 MG: 20 INJECTION, SOLUTION EPIDURAL; INFILTRATION; INTRACAUDAL; PERINEURAL at 13:11

## 2025-05-02 RX ADMIN — FENTANYL CITRATE 50 MCG: 50 INJECTION, SOLUTION INTRAMUSCULAR; INTRAVENOUS at 15:23

## 2025-05-02 RX ADMIN — MIDAZOLAM HYDROCHLORIDE 2 MG: 1 INJECTION, SOLUTION INTRAMUSCULAR; INTRAVENOUS at 13:00

## 2025-05-02 RX ADMIN — Medication 50 MG: at 13:11

## 2025-05-02 RX ADMIN — FENTANYL CITRATE 50 MCG: 50 INJECTION, SOLUTION INTRAMUSCULAR; INTRAVENOUS at 15:38

## 2025-05-02 RX ADMIN — HYDROMORPHONE HYDROCHLORIDE 1.5 MG: 2 INJECTION INTRAMUSCULAR; INTRAVENOUS; SUBCUTANEOUS at 13:18

## 2025-05-02 RX ADMIN — ROCURONIUM BROMIDE 100 MG: 10 INJECTION INTRAVENOUS at 13:11

## 2025-05-02 SDOH — ECONOMIC STABILITY: TRANSPORTATION INSECURITY
IN THE PAST 12 MONTHS, HAS LACK OF RELIABLE TRANSPORTATION KEPT YOU FROM MEDICAL APPOINTMENTS, MEETINGS, WORK OR FROM GETTING THINGS NEEDED FOR DAILY LIVING?: NO

## 2025-05-02 SDOH — ECONOMIC STABILITY: TRANSPORTATION INSECURITY
IN THE PAST 12 MONTHS, HAS THE LACK OF TRANSPORTATION KEPT YOU FROM MEDICAL APPOINTMENTS OR FROM GETTING MEDICATIONS?: NO

## 2025-05-02 ASSESSMENT — PAIN DESCRIPTION - PAIN TYPE
TYPE: SURGICAL PAIN
TYPE: ACUTE PAIN;SURGICAL PAIN
TYPE: SURGICAL PAIN
TYPE: ACUTE PAIN
TYPE: SURGICAL PAIN
TYPE: SURGICAL PAIN

## 2025-05-02 ASSESSMENT — COGNITIVE AND FUNCTIONAL STATUS - GENERAL
SUGGESTED CMS G CODE MODIFIER MOBILITY: CH
SUGGESTED CMS G CODE MODIFIER DAILY ACTIVITY: CH
DAILY ACTIVITIY SCORE: 24
MOBILITY SCORE: 24

## 2025-05-02 ASSESSMENT — SOCIAL DETERMINANTS OF HEALTH (SDOH)
WITHIN THE LAST YEAR, HAVE YOU BEEN HUMILIATED OR EMOTIONALLY ABUSED IN OTHER WAYS BY YOUR PARTNER OR EX-PARTNER?: NO
IN THE PAST 12 MONTHS, HAS THE ELECTRIC, GAS, OIL, OR WATER COMPANY THREATENED TO SHUT OFF SERVICE IN YOUR HOME?: NO
WITHIN THE LAST YEAR, HAVE TO BEEN RAPED OR FORCED TO HAVE ANY KIND OF SEXUAL ACTIVITY BY YOUR PARTNER OR EX-PARTNER?: NO
WITHIN THE PAST 12 MONTHS, THE FOOD YOU BOUGHT JUST DIDN'T LAST AND YOU DIDN'T HAVE MONEY TO GET MORE: NEVER TRUE
WITHIN THE LAST YEAR, HAVE YOU BEEN AFRAID OF YOUR PARTNER OR EX-PARTNER?: NO
WITHIN THE PAST 12 MONTHS, YOU WORRIED THAT YOUR FOOD WOULD RUN OUT BEFORE YOU GOT THE MONEY TO BUY MORE: NEVER TRUE
WITHIN THE LAST YEAR, HAVE YOU BEEN KICKED, HIT, SLAPPED, OR OTHERWISE PHYSICALLY HURT BY YOUR PARTNER OR EX-PARTNER?: NO

## 2025-05-02 ASSESSMENT — LIFESTYLE VARIABLES
DOES PATIENT WANT TO STOP DRINKING: NO
ALCOHOL_USE: YES
EVER FELT BAD OR GUILTY ABOUT YOUR DRINKING: NO
HOW MANY TIMES IN THE PAST YEAR HAVE YOU HAD 5 OR MORE DRINKS IN A DAY: 5
TOTAL SCORE: 0
AVERAGE NUMBER OF DAYS PER WEEK YOU HAVE A DRINK CONTAINING ALCOHOL: 3
CONSUMPTION TOTAL: POSITIVE
EVER HAD A DRINK FIRST THING IN THE MORNING TO STEADY YOUR NERVES TO GET RID OF A HANGOVER: NO
HAVE PEOPLE ANNOYED YOU BY CRITICIZING YOUR DRINKING: NO
TOTAL SCORE: 0
HAVE YOU EVER FELT YOU SHOULD CUT DOWN ON YOUR DRINKING: NO
ON A TYPICAL DAY WHEN YOU DRINK ALCOHOL HOW MANY DRINKS DO YOU HAVE: 3
TOTAL SCORE: 0

## 2025-05-02 ASSESSMENT — FIBROSIS 4 INDEX: FIB4 SCORE: 0.65

## 2025-05-02 ASSESSMENT — PAIN SCALES - GENERAL: PAIN_LEVEL: 5

## 2025-05-02 NOTE — ANESTHESIA PROCEDURE NOTES
Peripheral Block    Date/Time: 5/2/2025 12:35 PM    Performed by: Dl Dale M.D.  Authorized by: Dl Dale M.D.    Patient Location:  Pre-op  Start Time:  5/2/2025 12:35 PM  Reason for Block: at surgeon's request and post-op pain management ONLY    patient identified, IV checked, site marked, risks and benefits discussed, surgical consent, monitors and equipment checked, pre-op evaluation and timeout performed    Patient Position:  Supine  Prep: ChloraPrep    Monitoring:  Heart rate, continuous pulse ox and cardiac monitor  Block Region:  Trunk  Trunk - Block Type:  Other  Other Block Type: Erector spinae plan block  Laterality:  Bilateral  Procedures: ultrasound guided  Image captured, interpreted and electronically stored.  Block Type:  Single-shot  Needle Length:  100mm  Needle Gauge:  21 G  Needle Localization:  Ultrasound guidance  Ultrasound picture in chart  Injection Assessment:  Negative aspiration for heme, no paresthesia on injection, incremental injection and local visualized surrounding nerve on ultrasound  Evidence of intravascular injection: No     The plane between the erector spinae and the transverse process identified.  Local anesthetic deposited between these two structures.  Deposition of local identified by spreading of the ABRIL plane above there TP.

## 2025-05-02 NOTE — OR SURGEON
Immediate Post OP Note    PreOp Diagnosis: Dysmenorrhea  History of stage IV endometriosis desiring of hysterectomy  Stage I endometriosis  Adenomyosis    PostOp Diagnosis: Same as above      Procedure(s):  ROBOTIC HYSTERECTOMY, BILATERAL SALPINGECTOMY - Wound Class: Clean Contaminated    Surgeon(s):  Jeff Larry M.D.    Anesthesiologist/Type of Anesthesia:  Anesthesiologist: Dl Dale M.D./General    Surgical Staff:  Circulator: Korin Keene  Scrub Person: Elle Perdomo; Sabiha Cervantes  First Assist: NAY WellsRNATALYA    Specimens removed if any:  ID Type Source Tests Collected by Time Destination   A : Uterus, Cervix, Bilateral Fallopian Tubes Tissue Uterus PATHOLOGY SPECIMEN Jeff Larry M.D. 5/2/2025  2:02 PM        Estimated Blood Loss: 25 cc    Findings: No evidence of ascites.  Normal right left diaphragm.  Liver capsule smooth.  Stomach appear grossly normal.  Abdominal peritoneal surfaces were markable.  Omentum were grossly normal.  Fundus was grossly normal.  Small bowel mesentery and serosa of the small bowel was unremarkable.  In the pelvis uterus appears quite boggy clinically consistent with endometriosis uterus is approximately 10 weeks in size the adnexal structures are unremarkable.  Ovaries appear grossly normal.  There was endometriosis implant on the surface of the ovary.    Complications: None        5/2/2025 2:30 PM Jeff Larry M.D.

## 2025-05-02 NOTE — ANESTHESIA TIME REPORT
Anesthesia Start and Stop Event Times       Date Time Event    5/2/2025 1305 Ready for Procedure     1307 Anesthesia Start     1442 Anesthesia Stop          Responsible Staff  05/02/25      Name Role Begin End    Dl Dale M.D. Anesth 1307 1442          Overtime Reason:  no overtime (within assigned shift)    Comments:

## 2025-05-02 NOTE — ANESTHESIA POSTPROCEDURE EVALUATION
Patient: Maritza Swann    Procedure Summary       Date: 05/02/25 Room / Location: [place] [id number] / SURGERY [facility]    Anesthesia Start: 1307 Anesthesia Stop: 1442    Procedure: ROBOTIC HYSTERECTOMY, BILATERAL SALPINGECTOMY (Pelvis) Diagnosis: (ENDOMETRIOSIS)    Surgeons: Jeff Larry M.D. Responsible Provider: Dl Dale M.D.    Anesthesia Type: general, peripheral nerve block ASA Status: 2            Final Anesthesia Type: general, peripheral nerve block  Last vitals  BP   Blood Pressure: 129/60    Temp   36.3 °C (97.4 °F)    Pulse   (!) 59   Resp   15    SpO2   95 %      Anesthesia Post Evaluation    Patient location during evaluation: PACU  Patient participation: complete - patient participated  Level of consciousness: awake and alert  Pain score: 5    Airway patency: patent  Anesthetic complications: no  Cardiovascular status: hemodynamically stable  Respiratory status: acceptable  Hydration status: euvolemic    PONV: none          No notable events documented.     Nurse Pain Score: 5 (NPRS)

## 2025-05-02 NOTE — DISCHARGE INSTRUCTIONS
HOME CARE INSTRUCTIONS    ACTIVITY: Rest and take it easy for the first 24 hours.  A responsible adult is recommended to remain with you during that time.  It is normal to feel sleepy.  We encourage you to not do anything that requires balance, judgment or coordination.    FOR 24 HOURS DO NOT:  Drive, operate machinery or run household appliances.  Drink beer or alcoholic beverages.  Make important decisions or sign legal documents.    SPECIAL INSTRUCTIONS:     For after your surgery:  1. No heavy lifting greater than 10 pounds for a minimum 6 weeks  2. Nothing in the vagina (ie no tampons, douching, intercourse) for a minimum of 6 weeks  3. No driving while taking narcotics  4. Call our office [phone] if you develop any fevers, chills, nausea/vomiting, heavy vaginal bleeding, or redness, tenderness, and/or  drainage from your wound, if you have persistent watery discharge while ambulating or stool draining from the vagina.  5. Showering with warm water is ok, no bathing or swimming  6. You may remove wound dressing on postoperative day 1, and place lose bandages for two weeks  7. You may have vaginal spotting or light bleeding which is normal.  8. If you have not had a bowel movement for 2 days, please take over the counter Milk of Magnesium, 1 tablespoon every 4 hours. After 4  doses and if you still have not had a bowel movement, please call your doctor.  9. You may eat a soft diet, such as soup, liquid, for day #1 and if tolerating you may resume your regular diet.  10. If you have had urinary stent(s) placed, please make arrangements to have removed 6 weeks after surgery.  11. If you have had a bowel resection, do no use suppositories.      DIET: To avoid nausea, slowly advance diet as tolerated, avoiding spicy or greasy foods for the first day.  Add more substantial food to your diet according to your physician's instructions.  Babies can be fed formula or breast milk as soon as they are hungry.  INCREASE  FLUIDS AND FIBER TO AVOID CONSTIPATION.    MEDICATIONS: Resume taking daily medication.  Take prescribed pain medication with food.  If no medication is prescribed, you may take non-aspirin pain medication if needed.  PAIN MEDICATION CAN BE VERY CONSTIPATING.  Take a stool softener or laxative such as senokot, pericolace, or milk of magnesia if needed.    Prescription given for _______.   gIVEN tYLENOL 1000MG AT 12:45 pm .   Last pain medication given at __________.    A follow-up appointment should be arranged with your doctor; call to schedule.    You should CALL YOUR PHYSICIAN if you develop:  Fever greater than 101 degrees F.  Pain not relieved by medication, or persistent nausea or vomiting.  Excessive bleeding (blood soaking through dressing) or unexpected drainage from the wound.  Extreme redness or swelling around the incision site, drainage of pus or foul smelling drainage.  Inability to urinate or empty your bladder within 8 hours.  Problems with breathing or chest pain.    You should call 911 if you develop problems with breathing or chest pain.  If you are unable to contact your doctor or surgical center, you should go to the nearest emergency room or urgent care center.  Physician's telephone #: Dr. Larry [phone]    MILD FLU-LIKE SYMPTOMS ARE NORMAL.  YOU MAY EXPERIENCE GENERALIZED MUSCLE ACHES, THROAT IRRITATION, HEADACHE AND/OR SOME NAUSEA.    If any questions arise, call your doctor.  If your doctor is not available, please feel free to call the Surgical Center at [phone].  The Center is open Monday through Friday from 7AM to 7PM.      A registered nurse may call you a few days after your surgery to see how you are doing after your procedure.    You may also receive a survey in the mail within the next two weeks and we ask that you take a few moments to complete the survey and return it to us.  Our goal is to provide you with very good care and we value your comments.     Depression /  Suicide Risk    As you are discharged from this [facility] facility, it is important to learn how to keep safe from harming yourself.    Recognize the warning signs:  Abrupt changes in personality, positive or negative- including increase in energy   Giving away possessions  Change in eating patterns- significant weight changes-  positive or negative  Change in sleeping patterns- unable to sleep or sleeping all the time   Unwillingness or inability to communicate  Depression  Unusual sadness, discouragement and loneliness  Talk of wanting to die  Neglect of personal appearance   Rebelliousness- reckless behavior  Withdrawal from people/activities they love  Confusion- inability to concentrate     If you or a loved one observes any of these behaviors or has concerns about self-harm, here's what you can do:  Talk about it- your feelings and reasons for harming yourself  Remove any means that you might use to hurt yourself (examples: pills, rope, extension cords, firearm)  Get professional help from the community (Mental Health, Substance Abuse, psychological counseling)  Do not be alone:Call your Safe Contact- someone whom you trust who will be there for you.  Call your local CRISIS HOTLINE 508-2721 or [phone]  Call your local Children's Mobile Crisis Response Team [place] (423) 522-8925 or www.[employer]  Call the toll free National Suicide Prevention Hotlines   National Suicide Prevention Lifeline 911-642-IBNQ (2012)  National Hope Line Network 800-SUICIDE (349-3539)    I acknowledge receipt and understanding of these Home Care instructions.

## 2025-05-02 NOTE — ANESTHESIA PREPROCEDURE EVALUATION
Case: [id number] Date/Time: 05/02/25 1300    Procedure: ROBOTIC HYSTERECTOMY, BILATERAL SALPINGECTOMY, POSSIBLE RIGHT AND OR LEFT OOPHORECTOMY, EXCISION OF ENDOMETRIOSIS IMPLANTS    Pre-op diagnosis: ENDOMETRIOSIS    Location: [facility] OR 17 / SURGERY [facility]    Surgeons: Jeff Larry M.D.            Relevant Problems   ANESTHESIA (within normal limits)      PULMONARY   (positive) Mild intermittent asthma without complication      NEURO   (positive) Migraines      CARDIAC   (positive) Migraines       Physical Exam    Airway   Mallampati: II  TM distance: >3 FB  Neck ROM: full       Cardiovascular - normal exam  Rhythm: regular  Rate: normal  (-) murmur     Dental - normal exam           Pulmonary - normal exam  Breath sounds clear to auscultation     Abdominal   (+) obese     Neurological - normal exam                   Anesthesia Plan    ASA 2       Plan - general and peripheral nerve block     Peripheral nerve block will be post-op pain control  Airway plan will be ETT          Induction: intravenous    Postoperative Plan: Postoperative administration of opioids is intended.    Pertinent diagnostic labs and testing reviewed    Informed Consent:    Anesthetic plan and risks discussed with patient.    Use of blood products discussed with: patient whom consented to blood products.

## 2025-05-02 NOTE — ANESTHESIA PROCEDURE NOTES
Airway    Date/Time: 5/2/2025 1:14 PM    Performed by: Dl Dale M.D.  Authorized by: Dl Dale M.D.    Location:  OR  Urgency:  Elective  Difficult Airway: No    Indications for Airway Management:  Anesthesia      Spontaneous Ventilation: absent    Sedation Level:  Deep  Preoxygenated: Yes    Patient Position:  Sniffing  Final Airway Type:  Endotracheal airway  Final Endotracheal Airway:  ETT  Cuffed: Yes    Technique Used for Successful ETT Placement:  Direct laryngoscopy  Devices/Methods Used in Placement:  Anterior pressure/BURP and cricoid pressure    Insertion Site:  Oral  Blade Type:  Janice  Laryngoscope Blade/Videolaryngoscope Blade Size:  3  ETT Size (mm):  7.5  Measured from:  Teeth  ETT to Teeth (cm):  22  Placement Verified by: auscultation and capnometry    Cormack-Lehane Classification:  Grade IIb - view of arytenoids or posterior of glottis only  Number of Attempts at Approach:  1

## 2025-05-02 NOTE — OP REPORT
PreOp Diagnosis: Dysmenorrhea  History of stage IV endometriosis desiring of hysterectomy  Stage I endometriosis  Adenomyosis     PostOp Diagnosis: Same as above        Procedure(s):  ROBOTIC HYSTERECTOMY, BILATERAL SALPINGECTOMY - Wound Class: Clean Contaminated     Surgeon(s):  Jeff Larry M.D.     Anesthesiologist/Type of Anesthesia:  Anesthesiologist: Dl Dale M.D./General     Surgical Staff:  Circulator: Korin Keene  Scrub Person: Elle Perdomo; Sabiha Cervantes  First Assist: BIMAL Wells     Specimens removed if any:  ID Type Source Tests Collected by Time Destination   A : Uterus, Cervix, Bilateral Fallopian Tubes Tissue Uterus PATHOLOGY SPECIMEN Jeff Larry M.D. 2025  2:02 PM           Estimated Blood Loss: 25 cc     Findings: No evidence of ascites.  Normal right left diaphragm.  Liver capsule smooth.  Stomach appear grossly normal.  Abdominal peritoneal surfaces were markable.  Omentum were grossly normal.  Fundus was grossly normal.  Small bowel mesentery and serosa of the small bowel was unremarkable.  In the pelvis uterus appears quite boggy clinically consistent with endometriosis uterus is approximately 10 weeks in size the adnexal structures are unremarkable.  Ovaries appear grossly normal.  There was endometriosis implant on the surface of the ovary.     Complications: None    Indication: Ms. Swann is a pleasant 29 year old  female whose LMP was 3/8/25. She has a past medical history significant for asthma,  anemia, depression, migraine, and chronic lyme disease. She has a past surgical history significant for DX laparoscopy with  drainage of endometrioma (), breast augmentation (), chest tubeempyema () and DX laparoscopy with fulguration of  endo (). She was in her usual state of health until she presented to Dr. Magui Blas on 25 for routine well woman care.  She has been on Orilissa for 2 years and did have substantial relief  from her side effects. She had improvement of her  intermenstrual pelvic pain, her dysmenorrhea, and her dyspareunia.     She wanted to undergo hysterectomy.  She was recommended to undergo robotic hysterectomy with bilateral salpingectomy.  Due to her age I recommended that the ovaries were preserved if ovaries are not pathologic.  Risk benefits and rationale procedures were reviewed with the patient detail patient's understanding of these risk and wished to proceed with the surgery plan    Procedure:After achieving adequate general anesthesia, patient was prepped and draped and positioned in modified dorsal lithotomy position. Antibiotics were delivered. Surgical timeout was called. We then proceeded with our intended procedure.     A 1 cm umbilical incision was made.  A Veress needle was inserted without difficulty. Abdominal pressure was noted to be < 5mm Hg.  Pneumoperitoneum was achieved to the abdominal pressure of 15 mmHg.  A 8 mm trocar was inserted without difficulty.  After completion of this, a laparoscope was placed through this port and exploratory laparoscopy revealed the above findings.  Two 8 mm ports were placed in the right upper quadrant 8 cm apart while one 8 mm port was placed in the left upper quadrant 8 cm apart.  After completion of this, the patient was placed in steep Trendelenburg position.  The robotic system was then docked and after docking the robotic system, the instrumentation was inserted under direct laparoscopic visualization to insure that there was no injury to the abdominal contents.  Once this was completed, the robotic camera was then docked.  We then proceeded with our [place] portion of the procedure.    Bilateral fallopian tubes were suspended.  The mesosalpinx was exposed.  Bipolar cautery was used to cauterize the mesosalpinx and bilateral salpingectomy was performed.  This was followed by coagulating the utero-ovarian ligament bilaterally.   The uterus was then  elevated ventrally exposing the cul de sac. The right and left pelvic ureters were identified.    The medial leaf of the peritoneum was then incised down to the level of the uterosacral ligament.  Ureters were dissected laterally while developing the Ok[place]ayashi space.  The round ligament was then divided followed by the anterior broad leaf ligament. The Prograsp from the  robotic “3rd arm” was then used to grasp the triple pedicle and counter traction was provided while the cardinal ligament was exposed. Uterine artery and vein were then subsequently skeletonized.  Using the bipolar cautery, the uterine artery and vein were then cauterized juxtaposition to the fundus of the uterus.  The remainder of the lower uterine segment was likewise divided. After completion of this, the uterus was then retracted ventrally and the uterosacral ligaments were then independently divided.  Great care was then taken to clearly not injure the ureter.  After the uterosacral ligaments were then divided, the anterior branches of the uterine vessels were then subsequently skeletonized and cauterized with bipolar cautery and divided.  The bladder peritoneum was then subsequently taken down.  Once we were assured that the bladder was dissected off the paracervical fascia, the posterior colpotomy posterior colpotomy was made.  The vagina was circumferentially incised to complete the hysterectomy and bilateral salpingectomy.   The specimen was removed through the vaginal vault without difficulty.  The vaginal cuff was then closed with O Quill PDS suture in 2 layer running fashion.      Pelvic washings were then obtained. The pelvis was then copiously irrigated with water and we established hemostasis. We then accounted for sponges and needles. Once this was confirmed correct, robotic instrumentation with withdraw and pneumoperitoneum was allowed to escape through the 8 mm ports. After appropriate expelling all the intrabdominal  pneumoperitoneum, we irrigated the subcutaneous tissue with water. The skin was reapproximated with 3-0 monocryl suture. The incision was then injected with 0.25% Marcaine for local anesthetic effect. Dressings were appropriately placed.     Patient tolerated the procedure well without any difficulties and was extubated and transferred to the PACU in stable excellet condition.

## 2025-05-02 NOTE — PROGRESS NOTES
Medication history reviewed with PT at bedside    Med rec is complete per PT reporting    Allergies reviewed.     Patient denies any outpatient antibiotics in the last 30 days.     Patient is not taking anticoagulants.    Dispense history is not available in [facility].    Preferred pharmacy for this visit - PT has esteban le her Post Op meds picked up from [facility] ([phone])

## 2025-05-03 VITALS
BODY MASS INDEX: 31.88 KG/M2 | OXYGEN SATURATION: 100 % | DIASTOLIC BLOOD PRESSURE: 78 MMHG | HEIGHT: 63 IN | SYSTOLIC BLOOD PRESSURE: 151 MMHG | HEART RATE: 59 BPM | WEIGHT: 179.9 LBS | TEMPERATURE: 97.5 F | RESPIRATION RATE: 16 BRPM

## 2025-05-03 LAB
ANION GAP SERPL CALC-SCNC: 11 MMOL/L (ref 7–16)
BASOPHILS # BLD AUTO: 0 % (ref 0–1.8)
BASOPHILS # BLD: 0 K/UL (ref 0–0.12)
BUN SERPL-MCNC: 9 MG/DL (ref 8–22)
CALCIUM SERPL-MCNC: 8.9 MG/DL (ref 8.5–10.5)
CHLORIDE SERPL-SCNC: 103 MMOL/L (ref 96–112)
CO2 SERPL-SCNC: 25 MMOL/L (ref 20–33)
CREAT SERPL-MCNC: 0.78 MG/DL (ref 0.5–1.4)
EOSINOPHIL # BLD AUTO: 0 K/UL (ref 0–0.51)
EOSINOPHIL NFR BLD: 0 % (ref 0–6.9)
ERYTHROCYTE [DISTWIDTH] IN BLOOD BY AUTOMATED COUNT: 46.6 FL (ref 35.9–50)
GFR SERPLBLD CREATININE-BSD FMLA CKD-EPI: 104 ML/MIN/1.73 M 2
GLUCOSE SERPL-MCNC: 133 MG/DL (ref 65–99)
HCT VFR BLD AUTO: 39.7 % (ref 37–47)
HGB BLD-MCNC: 13.2 G/DL (ref 12–16)
LYMPHOCYTES # BLD AUTO: 1.34 K/UL (ref 1–4.8)
LYMPHOCYTES NFR BLD: 10.4 % (ref 22–41)
MANUAL DIFF BLD: NORMAL
MCH RBC QN AUTO: 33.9 PG (ref 27–33)
MCHC RBC AUTO-ENTMCNC: 33.2 G/DL (ref 32.2–35.5)
MCV RBC AUTO: 102.1 FL (ref 81.4–97.8)
MONOCYTES # BLD AUTO: 0.9 K/UL (ref 0–0.85)
MONOCYTES NFR BLD AUTO: 7 % (ref 0–13.4)
MORPHOLOGY BLD-IMP: NORMAL
NEUTROPHILS # BLD AUTO: 10.66 K/UL (ref 1.82–7.42)
NEUTROPHILS NFR BLD: 82.6 % (ref 44–72)
NRBC # BLD AUTO: 0 K/UL
NRBC BLD-RTO: 0 /100 WBC (ref 0–0.2)
PLATELET # BLD AUTO: 233 K/UL (ref 164–446)
PLATELET BLD QL SMEAR: NORMAL
PMV BLD AUTO: 12 FL (ref 9–12.9)
POTASSIUM SERPL-SCNC: 4.1 MMOL/L (ref 3.6–5.5)
RBC # BLD AUTO: 3.89 M/UL (ref 4.2–5.4)
RBC BLD AUTO: NORMAL
SODIUM SERPL-SCNC: 139 MMOL/L (ref 135–145)
WBC # BLD AUTO: 12.9 K/UL (ref 4.8–10.8)

## 2025-05-03 PROCEDURE — 96376 TX/PRO/DX INJ SAME DRUG ADON: CPT

## 2025-05-03 PROCEDURE — 700102 HCHG RX REV CODE 250 W/ 637 OVERRIDE(OP)

## 2025-05-03 PROCEDURE — A9270 NON-COVERED ITEM OR SERVICE: HCPCS

## 2025-05-03 PROCEDURE — 85007 BL SMEAR W/DIFF WBC COUNT: CPT

## 2025-05-03 PROCEDURE — 80048 BASIC METABOLIC PNL TOTAL CA: CPT

## 2025-05-03 PROCEDURE — 85027 COMPLETE CBC AUTOMATED: CPT

## 2025-05-03 PROCEDURE — G0378 HOSPITAL OBSERVATION PER HR: HCPCS

## 2025-05-03 PROCEDURE — 700111 HCHG RX REV CODE 636 W/ 250 OVERRIDE (IP): Mod: JZ | Performed by: SPECIALIST

## 2025-05-03 PROCEDURE — 36415 COLL VENOUS BLD VENIPUNCTURE: CPT

## 2025-05-03 RX ORDER — OXYCODONE AND ACETAMINOPHEN 5; 325 MG/1; MG/1
1 TABLET ORAL EVERY 4 HOURS PRN
Refills: 0 | Status: DISCONTINUED | OUTPATIENT
Start: 2025-05-03 | End: 2025-05-03 | Stop reason: HOSPADM

## 2025-05-03 RX ADMIN — OXYCODONE AND ACETAMINOPHEN 1 TABLET: 5; 325 TABLET ORAL at 08:23

## 2025-05-03 RX ADMIN — KETOROLAC TROMETHAMINE 15 MG: 15 INJECTION, SOLUTION INTRAMUSCULAR; INTRAVENOUS at 05:01

## 2025-05-03 ASSESSMENT — PAIN DESCRIPTION - PAIN TYPE
TYPE: ACUTE PAIN

## 2025-05-03 NOTE — DISCHARGE SUMMARY
Discharge Summary    CHIEF COMPLAINT ON ADMISSION  No chief complaint on file.      Reason for Admission  Low oxygen saturation     Admission Date  5/2/2025    CODE STATUS  Full Code    HPI & HOSPITAL COURSE  This is a 29 y.o. G1, P1 female with history of stage IV endometriosis managed with [facility].  Patient failed medical management and had persistent menstrual pelvic pain, dysmenorrhea, and dyspareunia.  Given history of stage IV endometriosis she was referred to Dr. Larry for further consultation.  Patient was counseled regarding surgery and elected to undergo robotic assisted hysterectomy with bilateral salpingectomy.     She was admitted on 5/2/2025 to undergo robotic assisted hysterectomy with bilateral salpingectomy with Dr. Larry.  Intraoperative findings revealed endometriosis implant on the surface of 1 ovary, boggy uterus clinically consistent with adenomyosis.     Postoperatively patient was found to have low oxygen saturations and poor pain control, thus she was kept overnight for observation.  As of this morning she has been weaned off oxygen and her pain is much better controlled with Percocet.  She is tolerating p.o. intake, and ambulating, and voiding.   Her abdomen is appropriately tender and nondistended on exam today.   She is progressing as is expected postoperatively. She is in no acute distress.  Postoperative pain and nausea meds have been sent to her pharmacy prior to admission.  I reviewed postoperative activity restrictions with patient at bedside this morning.    No notes on file    Therefore, she is discharged in good and stable condition to home with close outpatient follow-up.    The patient recovered much more quickly than anticipated on admission.    Discharge Date  5/3/2025    FOLLOW UP ITEMS POST DISCHARGE  Follow-up with [facility] as planned    DISCHARGE DIAGNOSES  Principal Problem:    Low oxygen saturation (POA: Yes)  Active Problems:    Migraines (POA:  Unknown)  Resolved Problems:  Low oxygen saturation      FOLLOW UP  No future appointments.  [facility]  [address] [place] [id number] [phone]  Follow up on 6/11/2025  11AM      MEDICATIONS ON DISCHARGE     Medication List        CONTINUE taking these medications        Instructions   albuterol 108 (90 Base) MCG/ACT Aers inhalation aerosol   Inhale 2 Puffs every 6 hours as needed for Shortness of Breath.  Dose: 2 Puff     CALCIUM-VITAMIN D PO   Take 3 Capsules by mouth every day. With aloe  Dose: 3 Capsule     ibuprofen 200 MG Tabs  Commonly known as: Motrin   Take 400 mg by mouth every 6 hours as needed. Indications: Pain  Dose: 400 mg     MAGNESIUM CITRATE PO   Take 400 mg by mouth every day.  Dose: 400 mg     multivitamin Tabs   Take 1 Tablet by mouth every day.  Dose: 1 Tablet     prazosin 2 MG Caps  Commonly known as: Minipress   Take 4 mg by mouth at bedtime.  Dose: 4 mg     VITAMIN B-6 PO   Take 3 Capsules by mouth every day. With Ashwaganda, Fennel Seed, and Chamomile  Dose: 3 Capsule              Allergies  Allergies   Allergen Reactions    Iron Sucrose Rash and Itching     Face, neck, arm rash with injection  (Reaction with IV iron only, not oral)       DIET  Orders Placed This Encounter   Procedures    Diet Order Diet: Full Liquid     Encourage carbohydrate containing liquids in PACU (milk, juice, or Impact).     Standing Status:   Standing     Number of Occurrences:   1     Diet::   Full Liquid [11]       ACTIVITY  For after your surgery:  1. No heavy lifting greater than 10 pounds for a minimum 6 weeks  2. Nothing in the vagina (ie no tampons, douching, intercourse) for a minimum of 6 weeks  3. No driving while taking narcotics  4. Call our office [phone] if you develop any fevers, chills, nausea/vomiting, heavy vaginal bleeding, or redness, tenderness, and/or  drainage from your wound, if you have persistent watery discharge while ambulating or stool draining from the  vagina.  5. Showering with warm water is ok, no bathing or swimming  6. You may remove wound dressing on postoperative day 1, and place lose bandages for two weeks  7. You may have vaginal spotting or light bleeding which is normal.  8. If you have not had a bowel movement for 2 days, please take over the counter Milk of Magnesium, 1 tablespoon every 4 hours. After 4  doses and if you still have not had a bowel movement, please call your doctor.  9. You may eat a soft diet, such as soup, liquid, for day #1 and if tolerating you may resume your regular diet.  10. If you have had urinary stent(s) placed, please make arrangements to have removed 6 weeks after surgery.  11. If you have had a bowel resection, do no use suppositories.      CONSULTATIONS  N/a    PROCEDURES  Robotic assisted hysterectomy with bilateral salpingectomy 5/2/2025 with Dr. Larry    LABORATORY  Lab Results   Component Value Date    SODIUM 139 05/03/2025    POTASSIUM 4.1 05/03/2025    CHLORIDE 103 05/03/2025    CO2 25 05/03/2025    GLUCOSE 133 (H) 05/03/2025    BUN 9 05/03/2025    CREATININE 0.78 05/03/2025        Lab Results   Component Value Date    WBC 12.9 (H) 05/03/2025    HEMOGLOBIN 13.2 05/03/2025    HEMATOCRIT 39.7 05/03/2025    PLATELETCT 233 05/03/2025        Total time of the discharge process exceeds 10 minutes.

## 2025-05-03 NOTE — PROGRESS NOTES
4 Eyes Skin Assessment Completed by TIAGO Bonilla and TIAGO Elliott.    Head WDL  Ears WDL  Nose WDL  Mouth WDL  Neck WDL  Breast/Chest WDL  Shoulder Blades WDL  Spine WDL  (R) Arm/Elbow/Hand WDL  (L) Arm/Elbow/Hand WDL  Abdomen Incision Lap sites x4  Groin WDL  Scrotum/Coccyx/Buttocks WDL  (R) Leg WDL  (L) Leg WDL  (R) Heel/Foot/Toe WDL  (L) Heel/Foot/Toe WDL          Devices In Places Pulse Ox and Nasal Cannula      Interventions In Place Gray Ear Foams, Pillows, and Pressure Redistribution Mattress    Possible Skin Injury No    Pictures Uploaded Into Epic N/A  Wound Consult Placed N/A  RN Wound Prevention Protocol Ordered No

## 2025-05-03 NOTE — OR NURSING
1438: Patient arrived from OR, handoff completed. Patient placed on monitors with audible alarms. SCD's applied. Patient somnolent but arousable to voice.     1500: Patient awake and responsive, medicated per MAR.    1600: Patient pain goals discussed, education provided. Patient education provided on incentive spirometer use. Frequent use encouraged. Patient return demonstration effective, 2600 mL.     1604: Notified anesthesia of patient's pain, orders given.    1615: Pain shifting per patient, medicated per MAR.     1630: No change in pain, patient medicated per MAR and encouraged to use incentive spirometry.     1645: Patient ambulated to bathroom with standby assist, steady. Juice provided.     1700: Pain goals discussed with patient, education provided. Encouraged patient to use incentive spirometer. Able to maintain SpO2 sat above 90% when awake and talking, quickly desats when falling asleep.    1749: Notified Dr. Larry of patient's pain and oxygenation status.     1805: Dr. Larry bedside.    1830: Patient's  and daughter bedside. Copy of intra-op photos given to . Cell phone, purse, and clothing returned to bedside. Patient glasses in labeled case placed in patient's purse.     1915: Patient ambulated to bathroom with standby assist, steady.     1937: Report given to Chad ORDOÑEZ, opportunity for questions provided.     2025: Patient with transport to room, on 1L O2 via NC, tank with >500L.

## 2025-05-03 NOTE — PROGRESS NOTES
I was called to evaluate patient's pain control.  Patient is couple of hours postsurgery for endometriosis.  Patient has received 10 mg of oxycodone and 30 mg of Toradol in addition to 100 mcg of fentanyl with 0.4 mg of Dilaudid.  She claims that her pain is growing.  Meanwhile her oxygenation is in the 70-80.  I went to assess her.  Patient is awake she is able to verbalize and communicate with me.  Patient claims that she has not been on chronic pain medication.  In my assessment evaluation she does not appear to be in distress.  She seems appropriate for postoperative pain.  Her O2 sat is in the 80s however I informed her that I would like to keep her overnight to observe her oxygenation.  I think her low O2 sat is secondary to the narcotics that she has received.  We will keep her for 23 hours observation.  While I will continue with Percocet 10 mg every 6 with 15 mg of Toradol IV every 6 for breakthrough pain.

## 2025-05-03 NOTE — CARE PLAN
The patient is Stable - Low risk of patient condition declining or worsening    Shift Goals  Clinical Goals: Monitor O2 status  Patient Goals: Pain control, sleep    Progress made toward(s) clinical / shift goals:     Problem: Pain - Standard  Goal: Alleviation of pain or a reduction in pain to the patient’s comfort goal  Outcome: Progressing     Problem: Knowledge Deficit - Standard  Goal: Patient and family/care givers will demonstrate understanding of plan of care, disease process/condition, diagnostic tests and medications  Outcome: Progressing     Problem: Respiratory  Goal: Patient will achieve/maintain optimum respiratory ventilation and gas exchange  Outcome: Progressing

## 2025-05-03 NOTE — CARE PLAN
The patient is Watcher - Medium risk of patient condition declining or worsening    Shift Goals  Clinical Goals: Patient will have improvement in her pain to a 4 or less  Patient Goals: Patient will have improvement in her pain to a 4 or less.  Family Goals: not present    Progress made toward(s) clinical / shift goals:  Order received for percocet 1 tab Q 4 prn. Given. Pain improved to a 3/10 which patient states is tolerable. O2 sats 100% on room air. Up sba with a steady gait. Order received for discharge. Patient's family picked up discharge medications yesterday. Discussed discharge instructions including medications, site care, when to seek medical attention. All questions answered. Patient escorted off unit via wheelchair with CNA and family for discharge home via private vehicle.

## 2025-07-31 ENCOUNTER — RESEARCH ENCOUNTER (OUTPATIENT)
Dept: SLEEP MEDICINE | Facility: MEDICAL CENTER | Age: 30
End: 2025-07-31
Payer: COMMERCIAL

## (undated) DEVICE — ELECTRODE 850 FOAM ADHESIVE - HYDROGEL RADIOTRNSPRNT (50/PK)

## (undated) DEVICE — PACK TRENGUARD 450 PROCEDURE (12EA/CA)

## (undated) DEVICE — SUTURE GENERAL

## (undated) DEVICE — COVER FOOT UNIVERSAL DISP. - (25EA/CA)

## (undated) DEVICE — WATER IRRIGATION STERILE 1000ML (12EA/CA)

## (undated) DEVICE — SPONGE GAUZESTER 4 X 4 4PLY - (128PK/CA)

## (undated) DEVICE — BOVIE BLADE COATED - (50/PK)

## (undated) DEVICE — PAD OR TABLE DA VINCI 2IN X 20IN X 72IN - (12EA/CA)

## (undated) DEVICE — CANISTER SUCTION 3000ML MECHANICAL FILTER AUTO SHUTOFF MEDI-VAC NONSTERILE LF DISP  (40EA/CA)

## (undated) DEVICE — TUBING CLEARLINK DUO-VENT - C-FLO (48EA/CA)

## (undated) DEVICE — GOWN WARMING STANDARD FLEX - (30/CA)

## (undated) DEVICE — GLOVE BIOGEL PI INDICATOR SZ 6.5 SURGICAL PF LF - (50/BX 4BX/CA)

## (undated) DEVICE — CHLORAPREP 26 ML APPLICATOR - ORANGE TINT(25/CA)

## (undated) DEVICE — KIT ANESTHESIA W/CIRCUIT & 3/LT BAG W/FILTER (20EA/CA)

## (undated) DEVICE — MASK ANESTHESIA ADULT  - (100/CA)

## (undated) DEVICE — KIT  I.V. START (100EA/CA)

## (undated) DEVICE — GOWN SURGEONS X-LARGE - DISP. (30/CA)

## (undated) DEVICE — PADDING CAST 6 IN STERILE - 6 X 4 YDS (24/CA)

## (undated) DEVICE — NEEDLE INSFL 120MM 14GA VRRS - (20/BX)

## (undated) DEVICE — SEAL UNIVERSAL 5MM-12MM (10EA/BX)

## (undated) DEVICE — ELECTRODE DUAL RETURN W/ CORD - (50/PK)

## (undated) DEVICE — LACTATED RINGERS INJ 1000 ML - (14EA/CA 60CA/PF)

## (undated) DEVICE — PACK LAP CHOLE OR - (2EA/CA)

## (undated) DEVICE — SENSOR OXIMETER ADULT SPO2 RD SET (20EA/BX)

## (undated) DEVICE — CANISTER SUCTION 3000ML MECHANICAL FILTER AUTO SHUTOFF MEDI-VAC NONSTERILE LF DISP (40EA/CA)

## (undated) DEVICE — GLOVE COTTON STERILE (50/CA)

## (undated) DEVICE — CANISTER SUCTION RIGID RED 1500CC (40EA/CA)

## (undated) DEVICE — SUTURE 3-0 MONOCRYL PLUS PS-1 - 27 INCH (36/BX)

## (undated) DEVICE — DRAIN CHEST ADULT (6EA/CA) DELETED ITEM  ORDER #15909

## (undated) DEVICE — BANDAID X-LARGE 2 X 4 IN LF (50EA/BX)

## (undated) DEVICE — NEEDLE DRIVER MEGA SUTURECUT DA VINCI 15X'S REUSABLE

## (undated) DEVICE — SODIUM CHL IRRIGATION 0.9% 1000ML (12EA/CA)

## (undated) DEVICE — KIT ROOM DECONTAMINATION

## (undated) DEVICE — SUCTION INSTRUMENT YANKAUER BULBOUS TIP W/O VENT (50EA/CA)

## (undated) DEVICE — SUTURE QUILL 0 PDO 14X14 - (12/BX)

## (undated) DEVICE — SET SUCTION/IRRIGATION WITH DISPOSABLE TIP (6/CA )PART #0250-070-520 IS A SUB

## (undated) DEVICE — TRAY SRGPRP PVP IOD WT PRP - (20/CA)

## (undated) DEVICE — DRAPE MAGNETIC (INSTRA-MAG) - (30/CA)

## (undated) DEVICE — TOWEL STOP TIMEOUT SAFETY FLAG (40EA/CA)

## (undated) DEVICE — TRAY CATHETER FOLEY URINE METER W/STATLOCK 350ML (10EA/CA)

## (undated) DEVICE — SET LEADWIRE 5 LEAD BEDSIDE DISPOSABLE ECG (1SET OF 5/EA)

## (undated) DEVICE — SUTURE 2-0 SILK FS (12EA/BX)

## (undated) DEVICE — GLOVE BIOGEL ECLIPSE PF LATEX SIZE 8.0  (50PR/BX)

## (undated) DEVICE — MANIFOLD NEPTUNE 1 PORT (20/PK)

## (undated) DEVICE — SUTURE 3-0 ETHILON PS-1 (36PK/BX)

## (undated) DEVICE — SUTURE 4-0 VICRYL PLUS FS-2 - 27 INCH (36/BX)

## (undated) DEVICE — CATHETER IV 20 GA X 1-1/4 ---SURG.& SDS ONLY--- (50EA/BX)

## (undated) DEVICE — TUBE CHEST 32FR. RIGHT ANGLED (10EA/CA)

## (undated) DEVICE — DRAPE C ARMOR (12EA/CA)

## (undated) DEVICE — ANTI-FOG SOLUTION - 60BTL/CA

## (undated) DEVICE — NEEDLE INSUFFLATION FOR STEP - (12/BX)

## (undated) DEVICE — BIPOLAR FORCE DA VINCI 12X'S REISABLE

## (undated) DEVICE — WATER IRRIG. STER. 1500 ML - (9/CA)

## (undated) DEVICE — PROTECTOR ULNA NERVE - (36PR/CA)

## (undated) DEVICE — SYRINGE 10 ML CONTROL LL (25EA/BX 4BX/CA)

## (undated) DEVICE — GLOVESZ 8.5 BIOGEL PI MICRO - PF LF (50PR/BX)

## (undated) DEVICE — SUTURE 2-0 VICRYL PLUS CT-1 36 (36PK/BX)"

## (undated) DEVICE — CATHETER TROCAR 28FR.

## (undated) DEVICE — SPATULA PERMANENT CAUTERY DA VINCI 10X'S REUSABLE

## (undated) DEVICE — BIT DRILL DIA2.6MM SCALED FOR VARIAX 2 WRIST FUSION LOCKING PLATE SYSTEM

## (undated) DEVICE — GLOVE BIOGEL PI ORTHO SZ 7.5 PF LF (40PR/BX)

## (undated) DEVICE — BANDAID SHEER STRIP 3/4 IN (100EA/BX 12BX/CA)

## (undated) DEVICE — MASK AIRWAY FLEXIBLE SINGLE-USE SIZE 4 ADULTS (10EA/BX)

## (undated) DEVICE — GLOVE BIOGEL SZ 8 SURGICAL PF LTX - (50PR/BX 4BX/CA)

## (undated) DEVICE — SET EXTENSION WITH 2 PORTS (48EA/CA) ***PART #2C8610 IS A SUBSTITUTE*****

## (undated) DEVICE — PAD SANITARY 11IN MAXI IND WRAPPED  (12EA/PK 24PK/CA)

## (undated) DEVICE — SPLINT PLASTER 5 IN X 30 IN - (50EA/BX 6BX/CA)

## (undated) DEVICE — SLEEVE VASO CALF MED - (10PR/CA)

## (undated) DEVICE — DRAPE COLUMN BOX OF 20

## (undated) DEVICE — SLEEVE VASO DVT COMPRESSION CALF MED - (10PR/CA)

## (undated) DEVICE — WRAP CO-FLEX 4IN X 5YD STERIL - SELF-ADHERENT (18/CA)

## (undated) DEVICE — TOURNIQUET CUFF 24 X 4 ONE PORT - STERILE (10/BX)

## (undated) DEVICE — TUBE CONNECTING SUCTION - CLEAR PLASTIC STERILE 72 IN (50EA/CA)

## (undated) DEVICE — STAPLER SKIN DISP - (6/BX 10BX/CA) VISISTAT

## (undated) DEVICE — GLOVE BIOGEL PI ORTHO SZ 6 1/2 SURGICAL PF LF (40PR/BX)

## (undated) DEVICE — PACK LAPAROSCOPY - (1/CA)

## (undated) DEVICE — COVER LIGHT HANDLE ALC PLUS DISP (18EA/BX)

## (undated) DEVICE — DRESSING TRANSPARENT FILM TEGADERM 2.375 X 2.75" (100EA/BX)"

## (undated) DEVICE — GLOVE SZ 6.5 BIOGEL PI MICRO - PF LF (50PR/BX)

## (undated) DEVICE — GLOVE BIOGEL INDICATOR SZ 8.5 SURGICAL PF LTX - (50/BX 4BX/CA)

## (undated) DEVICE — GLOVE SZ 7 BIOGEL PI MICRO - PF LF (50PR/BX 4BX/CA)

## (undated) DEVICE — TRAY SURESTEP FOLEY TEMP SENSING 16FR (10EA/CA) ORDER  #18764 FOR TEMP FOLEY ONLY

## (undated) DEVICE — BLADE SURGICAL #15 - (50/BX 3BX/CA)

## (undated) DEVICE — FORCEPS PROGRASP (18UN/EA)

## (undated) DEVICE — CONNECTOR Y TBG CRTY 5 IN 1 STERILE (50EA/CA)

## (undated) DEVICE — DRESSING ABDOMINAL PAD STERILE 8 X 10" (360EA/CA)"

## (undated) DEVICE — HEAD HOLDER JUNIOR/ADULT

## (undated) DEVICE — SUTURE 3-0 VICRYL PLUS SH - 8X 18 INCH (12/BX)

## (undated) DEVICE — ARMREST CRADLE FOAM - (2PR/PK 12PR/CA)

## (undated) DEVICE — TROCAR Z THREAD11MM OPTICAL - NON BLADED(6/BX)

## (undated) DEVICE — NEPTUNE 4 PORT MANIFOLD - (20/PK)

## (undated) DEVICE — DRAPE 36X28IN RAD CARM BND BG - (25/CA) O

## (undated) DEVICE — DRESSING 3X8 ADAPTIC GAUZE - NON-ADHERING (36/PK 6PK/BX)

## (undated) DEVICE — SOD. CHL. INJ. 0.9% 1000 ML - (14EA/CA 60CA/PF)

## (undated) DEVICE — CORDS BIPOLAR COAGULATION - 12FT STERILE DISP. (10EA/BX)

## (undated) DEVICE — DRESSING NON-ADHERING 8 X 3 - (50/BX)

## (undated) DEVICE — DA VINCI INSUFFLATOR TUBE SET - SMOKE EVACUATION (6EA/BX)

## (undated) DEVICE — PACK LOWER EXTREMITY - (2/CA)

## (undated) DEVICE — GLOVE BIOGEL INDICATOR SZ 7SURGICAL PF LTX - (50/BX 4BX/CA)

## (undated) DEVICE — Device

## (undated) DEVICE — DETERGENT RENUZYME PLUS 10 OZ PACKET (50/BX)

## (undated) DEVICE — SUTURE 4-0 PROLENE PS-2 18 (36PK/BX)"

## (undated) DEVICE — SYRINGE ASEPTO - (50EA/CA

## (undated) DEVICE — DRAPE ARM BOX OF 20

## (undated) DEVICE — MASK OXYGEN VNYL ADLT MED CONC WITH 7 FOOT TUBING  - (50EA/CA)

## (undated) DEVICE — DRAPE LARGE 3 QUARTER - (20/CA)

## (undated) DEVICE — GLOVE BIOGEL PI INDICATOR SZ 8.0 SURGICAL PF LF -(50/BX 4BX/CA)

## (undated) DEVICE — DRESSING PETROLEUM GAUZE 5 X 9" (50EA/BX 4BX/CA)"

## (undated) DEVICE — DRAPESURG STERI-DRAPE LONG - (10/BX 4BX/CA)

## (undated) DEVICE — PAD LAP STERILE 18 X 18 - (5/PK 40PK/CA)

## (undated) DEVICE — SENSOR SPO2 NEO LNCS ADHESIVE (20/BX) SEE USER NOTES

## (undated) DEVICE — STOCKINET BIAS 6 IN STERILE - (20/CA)

## (undated) DEVICE — CANNULA O2 COMFORT SOFT EAR ADULT 7 FT TUBING (50/CA)

## (undated) DEVICE — ENDO PEANUT 5MM DEVICE (12EA/BX)

## (undated) DEVICE — GLOVE BIOGEL SZ 8.5 SURGICAL PF LTX - (50PR/BX 4BX/CA)

## (undated) DEVICE — SHEET TRANSVERSE LAP - (12EA/CA)

## (undated) DEVICE — CONNECTOR 5 IN 1 STRAIGHT - (50/BX)